# Patient Record
Sex: MALE
[De-identification: names, ages, dates, MRNs, and addresses within clinical notes are randomized per-mention and may not be internally consistent; named-entity substitution may affect disease eponyms.]

---

## 2018-11-22 ENCOUNTER — HOSPITAL ENCOUNTER (EMERGENCY)
Dept: HOSPITAL 97 - ER | Age: 53
Discharge: HOME | End: 2018-11-22
Payer: COMMERCIAL

## 2018-11-22 DIAGNOSIS — R11.0: Primary | ICD-10-CM

## 2018-11-22 DIAGNOSIS — I10: ICD-10-CM

## 2018-11-22 LAB
ALBUMIN SERPL BCP-MCNC: 4 G/DL (ref 3.4–5)
ALP SERPL-CCNC: 93 U/L (ref 45–117)
ALT SERPL W P-5'-P-CCNC: 38 U/L (ref 12–78)
AST SERPL W P-5'-P-CCNC: 29 U/L (ref 15–37)
BUN BLD-MCNC: 14 MG/DL (ref 7–18)
GLUCOSE SERPLBLD-MCNC: 139 MG/DL (ref 74–106)
HCT VFR BLD CALC: 45.2 % (ref 39.6–49)
INR BLD: 0.95
LYMPHOCYTES # SPEC AUTO: 1.6 K/UL (ref 0.7–4.9)
MAGNESIUM SERPL-MCNC: 2 MG/DL (ref 1.8–2.4)
MCH RBC QN AUTO: 32.6 PG (ref 27–35)
MCV RBC: 92.1 FL (ref 80–100)
NT-PROBNP SERPL-MCNC: 8 PG/ML (ref ?–125)
PMV BLD: 10.3 FL (ref 7.6–11.3)
POTASSIUM SERPL-SCNC: 4 MMOL/L (ref 3.5–5.1)
RBC # BLD: 4.91 M/UL (ref 4.33–5.43)
TROPONIN I: < 0.02 NG/ML (ref 0–0.04)

## 2018-11-22 PROCEDURE — 85610 PROTHROMBIN TIME: CPT

## 2018-11-22 PROCEDURE — 83880 ASSAY OF NATRIURETIC PEPTIDE: CPT

## 2018-11-22 PROCEDURE — 81003 URINALYSIS AUTO W/O SCOPE: CPT

## 2018-11-22 PROCEDURE — 96375 TX/PRO/DX INJ NEW DRUG ADDON: CPT

## 2018-11-22 PROCEDURE — 70450 CT HEAD/BRAIN W/O DYE: CPT

## 2018-11-22 PROCEDURE — 84484 ASSAY OF TROPONIN QUANT: CPT

## 2018-11-22 PROCEDURE — 80048 BASIC METABOLIC PNL TOTAL CA: CPT

## 2018-11-22 PROCEDURE — 85025 COMPLETE CBC W/AUTO DIFF WBC: CPT

## 2018-11-22 PROCEDURE — 71045 X-RAY EXAM CHEST 1 VIEW: CPT

## 2018-11-22 PROCEDURE — 96365 THER/PROPH/DIAG IV INF INIT: CPT

## 2018-11-22 PROCEDURE — 83735 ASSAY OF MAGNESIUM: CPT

## 2018-11-22 PROCEDURE — 99284 EMERGENCY DEPT VISIT MOD MDM: CPT

## 2018-11-22 PROCEDURE — 80076 HEPATIC FUNCTION PANEL: CPT

## 2018-11-22 PROCEDURE — 93005 ELECTROCARDIOGRAM TRACING: CPT

## 2018-11-22 PROCEDURE — 36415 COLL VENOUS BLD VENIPUNCTURE: CPT

## 2018-11-22 NOTE — RAD REPORT
EXAM DESCRIPTION:  CT - Head Brain Wo Cont - 11/22/2018 5:57 pm

 

CLINICAL HISTORY:  dizziness,nausea

Drowsiness

 

COMPARISON:  No comparisons

 

TECHNIQUE:  All CT scans are performed using dose optimization technique as appropriate and may inclu
de automated exposure control or mA/KV adjustment according to patient size.

 

FINDINGS:  No intracranial hemorrhage, hydrocephalus or extra-axial fluid collection.No areas of brai
n edema or evidence of midline shift.

 

The paranasal sinuses and mastoids are clear. The calvarium is intact.

 

IMPRESSION:  No acute intracranial abnormality.

## 2018-11-22 NOTE — RAD REPORT
EXAM DESCRIPTION:  RAD - Chest Single View - 11/22/2018 6:55 pm

 

CLINICAL HISTORY:  dizziness, nausea

Chest pain.

 

COMPARISON:  No comparisons

 

FINDINGS:  Portable technique limits examination quality.

 

The lungs are grossly clear. The heart is normal in size. No displaced fractures.

 

IMPRESSION:  No acute intrathoracic process suspected.

## 2018-11-22 NOTE — EDPHYS
Physician Documentation                                                                           

 [facility]                                                                

Name: Michael Cuellar                                                                                 

Age: 53 yrs                                                                                       

Sex: Male                                                                                         

: 1965                                                                                   

MRN: [id number]                                                                                   

Arrival Date: 2018                                                                          

Time: 17:00                                                                                       

Account#: [id number]                                                                            

Bed 14                                                                                            

Private MD:                                                                                       

ED Physician Devon Strong                                                                    

HPI:                                                                                              

                                                                                             

17:20 This 53 yrs old  Male presents to ER via Ambulatory with complaints of          cp  

      Dizziness, Nausea.                                                                          

17:20 The patient presents with dizziness, lightheadedness, feeling off balance. Onset: The   cp  

      symptoms/episode began/occurred 1 hour(s) ago. Associated signs and symptoms: Pertinent     

      positives: headache, nausea, Pertinent negatives: abdominal pain, blurred vision, chest     

      pain, focal weakness, numbness, shortness of breath, syncope, vomiting. Severity of         

      symptoms: in the emergency department the symptoms are unchanged despite home               

      interventions. Patient's baseline: Neuro: alert and fully oriented, Motor: no deficits,     

      Ambulation: walks without assistance, Speech: normal.                                       

                                                                                                  

Historical:                                                                                       

- Allergies:                                                                                      

17:04 No Known Allergies;                                                                     aj1 

- Home Meds:                                                                                      

17:04 blood pressure medicine [Active];                                                       aj1 

- PMHx:                                                                                           

17:04 Hypertension;                                                                           aj1 

- PSHx:                                                                                           

17:04 None;                                                                                   aj1 

                                                                                                  

- Immunization history:: Flu vaccine is up to date.                                               

- Social history:: Smoking status: Patient/guardian denies using tobacco.                         

- Ebola Screening: : Patient denies travel to an Ebola-affected area in the 21 days               

  before illness onset.                                                                           

                                                                                                  

                                                                                                  

ROS:                                                                                              

17:22 Eyes: Negative for injury, pain, redness, and discharge.                                cp  

17:22 Constitutional: Negative for body aches, chills, fever, poor PO intake.                     

17:22 ENT: Negative for drainage from ear(s), ear pain, sore throat, difficulty swallowing,       

      difficulty handling secretions.                                                             

17:22 Cardiovascular: Negative for chest pain, edema, palpitations.                               

17:22 Respiratory: Negative for cough, shortness of breath, wheezing.                             

17:22 Abdomen/GI: Positive for nausea, Negative for abdominal pain, vomiting, diarrhea,           

      constipation, anorexia, black/tarry stool, rectal bleeding.                                 

17:22 Back: Negative for pain at rest, pain with movement, radiated pain.                         

17:22 : Negative for urinary symptoms.                                                          

17:22 Skin: Negative for cellulitis, rash.                                                        

17:22 Neuro: Positive for dizziness, headache, Negative for altered mental status, loss of        

      consciousness, syncope, near syncope, weakness.                                             

17:22 All other systems are negative.                                                             

                                                                                                  

Exam:                                                                                             

17:25 Constitutional: The patient appears in no acute distress, alert, awake,                 cp  

      non-diaphoretic, non-toxic, well developed, well nourished.                                 

17:25 Head/Face:  Normocephalic, atraumatic. Eyes:  Pupils equal round and reactive to light, cp  

      extra-ocular motions intact.  Lids and lashes normal.  Conjunctiva and sclera are           

      non-icteric and not injected.  Cornea within normal limits.  Periorbital areas with no      

      swelling, redness, or edema. ENT:  Nares patent. No nasal discharge, no septal              

      abnormalities noted.  Tympanic membranes are normal and external auditory canals are        

      clear.  Oropharynx with no redness, swelling, or masses, exudates, or evidence of           

      obstruction, uvula midline.  Mucous membranes moist. Neck:  Trachea midline, no             

      thyromegaly or masses palpated, and no cervical lymphadenopathy.  Supple, full range of     

      motion without nuchal rigidity, or vertebral point tenderness.  No Meningismus.             

      Chest/axilla:  Normal chest wall appearance and motion.  Nontender with no deformity.       

      No lesions are appreciated.                                                                 

17:25 Cardiovascular: Rate: normal, Rhythm: regular, Heart sounds: murmur, not appreciated,       

      Edema: is not appreciated, JVD: is not appreciated.                                         

17:25 Respiratory: the patient does not display signs of respiratory distress,  Respirations:     

      normal, no use of accessory muscles, no retractions, no splinting, no tachypnea,            

      labored breathing, is not present, Breath sounds: are clear throughout, no decreased        

      breath sounds, no stridor, no wheezing.                                                     

17:25 Abdomen/GI: Inspection: abdomen appears normal, Bowel sounds: active, all quadrants,        

      Palpation: abdomen is soft and non-tender, in all quadrants, rebound tenderness, is not     

      appreciated, voluntary guarding, is not appreciated, involuntary guarding, is not           

      appreciated.                                                                                

17:25 Back: pain, is absent, ROM is normal.                                                       

17:25 Skin: cellulitis, is not appreciated, no rash present.                                      

17:25 Neuro: Orientation: to person, place \T\ time. Mentation: is normal, Cerebellar function:   

      is grossly normal, Motor: moves all fours, strength is normal, Sensation: is normal.        

17:45 ECG was reviewed by the Attending Physician.                                            cp  

                                                                                                  

Vital Signs:                                                                                      

17:04  / 95; Pulse 80; Resp 18; Temp 97.7; Pulse Ox 98% on R/A; Weight 108.86 kg (R);   aj1 

      Height 5 ft. 8 in. (172.72 cm) (R); Pain 0/10;                                              

17:12  / 82 LA Supine; Pulse 77; Resp 18; Pulse Ox 97% on R/A;                          hj  

17:12  / 89 Sitting; Pulse 72; Resp 18; Pulse Ox 98% on R/A;                            hj  

17:12  / 89 Standing; Pulse 81; Resp 18; Pulse Ox 99% on R/A;                           hj  

18:06  / 84; Pulse 74; Resp 18; Pulse Ox 100% on R/A;                                   hj  

18:49  / 87; Pulse 67; Resp 18; Pulse Ox 100% on R/A;                                   hj  

19:20  / 85; Pulse 71; Resp 18 S; Pulse Ox 97% on R/A;                                  cc3 

20:45  / 96; Pulse 74; Resp 18 S; Pulse Ox 98% on R/A;                                  cc3 

17:04 Body Mass Index 36.49 (108.86 kg, 172.72 cm)                                            aj1 

                                                                                                  

MDM:                                                                                              

17:10 Patient medically screened.                                                             cp  

17:30 Differential diagnosis: cardiac arrhythmia, generalized weakness, hypovolemia,          cp  

      idiopathic dizziness, vertigo.                                                              

20:42 Data reviewed: vital signs, nurses notes, lab test result(s), EKG, radiologic studies,  cp  

      CT scan, plain films.                                                                       

20:42 Test interpretation: by ED physician or midlevel provider: ECG, plain radiologic        cp  

      studies. Counseling: I had a detailed discussion with the patient and/or guardian           

      regarding: the historical points, exam findings, and any diagnostic results supporting      

      the discharge/admit diagnosis, lab results, radiology results, to return to the             

      emergency department if symptoms worsen or persist or if there are any questions or         

      concerns that arise at home. Response to treatment: the patient's symptoms have             

      markedly improved after treatment. ED course: VSS. Patient reports symptoms markedly        

      improved. Will discharge to home for continued monitoring.                                  

                                                                                                  

                                                                                             

17:41 Order name: Basic Metabolic Panel; Complete Time: 19:06                                 EDMS

                                                                                             

19:07 Interpretation: Normal except: GLUC 139; GFR 78; CA 8.1.                                  

                                                                                             

17:41 Order name: Liver (Hepatic) Function; Complete Time: 19:06                              EDMS

                                                                                             

19:27 Interpretation: Reviewed.                                                                 

                                                                                             

17:41 Order name: Troponin I; Complete Time: 19:06                                            EDMS

                                                                                             

19:06 Interpretation: Within normal limits: TROP < 0.02.                                        

                                                                                             

17:41 Order name: NT PRO-BNP; Complete Time: 19:06                                            EDMS

                                                                                             

19:27 Interpretation: Within normal limits: NT PRO-BNP 8.                                     cp  

                                                                                             

17:41 Order name: Magnesium; Complete Time: 19:06                                             EDMS

                                                                                             

19:28 Interpretation: MG 2.0; Reviewed.                                                         

                                                                                             

17:41 Order name: CBC with Automated Diff; Complete Time: 19:06                               EDMS

                                                                                             

19:06 Interpretation: Reviewed.                                                                 

                                                                                             

17:41 Order name: Protime (+INR); Complete Time: 19:06                                        EDMS

                                                                                             

17:20 Order name: Orthostatics; Complete Time: 17:21                                            

                                                                                             

17:20 Order name: EKG; Complete Time: 18:16                                                     

                                                                                             

17:20 Order name: Cardiac monitoring; Complete Time: 17:21                                      

                                                                                             

17:20 Order name: EKG - Nurse/Tech; Complete Time: 17:32                                        

                                                                                             

17:20 Order name: IV Saline Lock; Complete Time: 17:32                                          

                                                                                             

17:20 Order name: Labs collected and sent; Complete Time: 17:32                                 

                                                                                             

17:20 Order name: O2 Per Protocol; Complete Time: 17:21                                         

                                                                                             

17:20 Order name: CT Head Brain wo Cont                                                         

                                                                                             

17:41 Order name: Head Brain Wo Cont; Complete Time: 19:06                                    EDMS

                                                                                             

19:07 Interpretation: Report reviewed.                                                          

                                                                                             

17:41 Order name: Chest Single View; Complete Time: 19:06                                     EDMS

                                                                                             

19:07 Interpretation: Report reviewed.                                                          

                                                                                             

18:53 Order name: Urine Dipstick--Ancillary (enter results); Complete Time: 20:42               

                                                                                             

17:20 Order name: O2 Sat Monitoring; Complete Time: 17:21                                     cp  

                                                                                                  

EC:45 Rate is 64 beats/min. Rhythm is regular. DE interval is normal. QRS interval is normal. cp  

      QT interval is normal. Interpreted by me. Reviewed by me.                                   

                                                                                                  

Administered Medications:                                                                         

17:25 Drug: Meclizine 25 mg Route: PO;                                                          

18:34 Follow up: Response: No adverse reaction                                                hj  

17:30 Drug: Zofran 4 mg Route: IVP; Site: right antecubital;                                  hj  

18:34 Follow up: Response: No adverse reaction; Nausea is decreased                             

19:45 Drug: Magnesium Sulfate 1 grams Route: IVPB; Infused Over: 1 hrs; Site: right           cc3 

      antecubital;                                                                                

20:45 Follow up: Response: No adverse reaction; IV Status: Completed infusion; IV Intake:     cc3 

      100ml                                                                                       

19:48 Not Given (Patient Refused): Dilaudid 0.5 mg IVP once                                   cc3 

                                                                                                  

                                                                                                  

Disposition:                                                                                      

18 20:43 Discharged to Home. Impression: Dizziness and giddiness, Nausea.                   

- Condition is Stable.                                                                            

- Discharge Instructions: Dizziness, Nausea, Adult.                                               

- Prescriptions for Meclizine 25 mg Oral Tablet - take 1 tablet by ORAL route every 8             

  hours As needed; 30 tablet. Zofran 4 mg Oral Tablet - take 1 tablet by ORAL route               

  every 12 hours As needed; 20 tablet.                                                            

- Medication Reconciliation Form, Thank You Letter, Antibiotic Education, Prescription            

  Opioid Use form.                                                                                

- Follow up: Private Physician; When: 1 - 2 days; Reason: Recheck today's complaints.             

- Problem is new.                                                                                 

- Symptoms have improved.                                                                         

                                                                                                  

                                                                                                  

                                                                                                  

Addendum:                                                                                         

2018                                                                                        

     06:04 Co-signature as Attending Physician, Devon Strong MD.                               m
a2

                                                                                                  

Signatures:                                                                                       

Dispatcher [employer]                           EDMS                                                 

Haley Henson RN RN   aj1                                                  

Martínez Alvarado RN RN hj Page, Corey, PA PA cp Alzahri, Mohammad, MD MD   ma2                                                  

Odalis Hagen                             cc3                                                  

                                                                                                  

Corrections: (The following items were deleted from the chart)                                    

                                                                                             

18:23 18:16 Chest Single View+RAD.RAD.BRZ ordered. EDMS                                       EDMS

19:07 19:06 Normal except: GLUC 139; GFR 78. cp                                               cp  

20:24 18:16 BASIC METABOLIC PANEL+C.LAB.BRZ ordered. EDMS                                     EDMS

20:24 18:16 CBC+H.LAB.BRZ ordered. EDMS                                                       EDMS

20:24 18:16 HEPATIC FUNCTION+C.LAB.BRZ ordered. EDMS                                          EDMS

20:24 18:16 MAGNESIUM+C.LAB.BRZ ordered. EDMS                                                 EDMS

20:24 18:16 PROBNP+C.LAB.BRZ ordered. EDMS                                                    EDMS

20:24 18:16 PROTIME (+INR)+COAG.LAB.BRZ ordered. EDMS                                         EDMS

20:24 18:16 TROPONIN (EMERG DEPT USE ONLY)+C.LAB.BRZ ordered. [facility]                            EDMS

21:00 20:43 2018 20:43 Discharged to Home. Impression: Dizziness and giddiness; Nausea. cc3 

      Condition is Stable. Forms are Medication Reconciliation Form, Thank You Letter,            

      Antibiotic Education, Prescription Opioid Use. Follow up: Private Physician; When: 1 -      

      2 days; Reason: Recheck today's complaints. Problem is new. Symptoms have improved. cp      

                                                                                                  

**************************************************************************************************

## 2018-11-22 NOTE — ER
Nurse's Notes                                                                                     

 [facility]                                                                

Name: Michael Cuellar                                                                                 

Age: 53 yrs                                                                                       

Sex: Male                                                                                         

: 1965                                                                                   

MRN: [id number]                                                                                   

Arrival Date: 2018                                                                          

Time: 17:00                                                                                       

Account#: [id number]                                                                            

Bed 14                                                                                            

Private MD:                                                                                       

Diagnosis: Dizziness and giddiness;Nausea                                                         

                                                                                                  

Presentation:                                                                                     

                                                                                             

17:02 Presenting complaint: Patient states: Dizziness and nausea for the past hour. Denies    aj1 

      pain. Transition of care: patient was not received from another setting of care. Onset      

      of symptoms was 2018 at 16:00. Risk Assessment: Do you want to hurt            

      yourself or someone else? Patient reports no desire to harm self or others. Initial         

      Sepsis Screen: Does the patient meet any 2 criteria? No. Patient's initial sepsis           

      screen is negative. Does the patient have a suspected source of infection? No.              

      Patient's initial sepsis screen is negative. Care prior to arrival: None.                   

17:02 Method Of Arrival: Ambulatory                                                           aj1 

17:02 Acuity: LAYLA 3                                                                           aj1 

                                                                                                  

Triage Assessment:                                                                                

17:04 General: Appears in no apparent distress. comfortable, Behavior is calm, cooperative,   aj1 

      appropriate for age. Pain: Denies pain. Neuro: Level of Consciousness is awake, alert,      

      obeys commands, Reports dizziness. Cardiovascular: Patient's skin is warm and dry.          

      Respiratory: Airway is patent Respiratory effort is even, unlabored, Respiratory            

      pattern is regular, symmetrical. GI: Reports nausea.                                        

                                                                                                  

Historical:                                                                                       

- Allergies:                                                                                      

17:04 No Known Allergies;                                                                     aj1 

- Home Meds:                                                                                      

17:04 blood pressure medicine [Active];                                                       aj1 

- PMHx:                                                                                           

17:04 Hypertension;                                                                           aj1 

- PSHx:                                                                                           

17:04 None;                                                                                   aj1 

                                                                                                  

- Immunization history:: Flu vaccine is up to date.                                               

- Social history:: Smoking status: Patient/guardian denies using tobacco.                         

- Ebola Screening: : Patient denies travel to an Ebola-affected area in the 21 days               

  before illness onset.                                                                           

                                                                                                  

                                                                                                  

Screenin:06 Abuse screen: Denies threats or abuse. Denies injuries from another. Nutritional        hj  

      screening: No deficits noted. Tuberculosis screening: No symptoms or risk factors           

      identified. Fall Risk None identified.                                                      

                                                                                                  

Assessment:                                                                                       

17:06 General: Appears in no apparent distress. uncomfortable, Behavior is calm, cooperative, hj  

      appropriate for age. Pain: Denies pain. Neuro: Level of Consciousness is awake, alert,      

      obeys commands, Oriented to person, place, time, situation, Appropriate for age Reports     

      dizziness. Cardiovascular: Capillary refill < 3 seconds Patient's skin is warm and dry.     

      Respiratory: Airway is patent Respiratory effort is even, unlabored, Respiratory            

      pattern is regular, symmetrical. GI: Reports nausea. GI: Abdomen is non-distended,          

      Bowel sounds present X 4 quads. Abd is soft and non tender. : No signs and/or             

      symptoms were reported regarding the genitourinary system. EENT: No signs and/or            

      symptoms were reported regarding the EENT system. Derm: No signs and/or symptoms            

      reported regarding the dermatologic system. Musculoskeletal: No signs and/or symptoms       

      reported regarding the musculoskeletal system.                                              

18:05 Reassessment: Patient and/or family updated on plan of care and expected duration. Pain hj  

      level reassessed. Patient is alert, oriented x 3, equal unlabored respirations, skin        

      warm/dry/pink. wheeled back from CT:.                                                       

18:33 Reassessment: Patient and/or family updated on plan of care and expected duration. Pain hj  

      level reassessed. Patient is alert, oriented x 3, equal unlabored respirations, skin        

      warm/dry/pink. provided urine sample;.                                                      

18:49 Reassessment: Patient and/or family updated on plan of care and expected duration. Pain hj  

      level reassessed. Patient is alert, oriented x 3, equal unlabored respirations, skin        

      warm/dry/pink. awaiting results and POC;.                                                   

19:15 Reassessment: Patient appears in no apparent distress at this time. Patient and/or      cc3 

      family updated on plan of care and expected duration. Pain level reassessed. Patient is     

      alert, oriented x 3, equal unlabored respirations, skin warm/dry/pink. Received this        

      male patient from morning shift RAMAKRISHNA Soliz as a case of dizziness and nausea. With IV         

      cannula gauge 20 at the right ACV saline locked.                                            

20:55 Reassessment: Patient appears in no apparent distress at this time. Patient and/or      cc3 

      family updated on plan of care and expected duration. Pain level reassessed. Patient is     

      alert, oriented x 3, equal unlabored respirations, skin warm/dry/pink. VINAY Ugarte              

      discharged the patient home with prescription given. IV cannula removed and patient         

      left ER vitally stable and ambulatory with his wife.                                        

                                                                                                  

Vital Signs:                                                                                      

17:04  / 95; Pulse 80; Resp 18; Temp 97.7; Pulse Ox 98% on R/A; Weight 108.86 kg (R);   aj1 

      Height 5 ft. 8 in. (172.72 cm) (R); Pain 0/10;                                              

17:12  / 82 LA Supine; Pulse 77; Resp 18; Pulse Ox 97% on R/A;                          hj  

17:12  / 89 Sitting; Pulse 72; Resp 18; Pulse Ox 98% on R/A;                            hj  

17:12  / 89 Standing; Pulse 81; Resp 18; Pulse Ox 99% on R/A;                           hj  

18:06  / 84; Pulse 74; Resp 18; Pulse Ox 100% on R/A;                                   hj  

18:49  / 87; Pulse 67; Resp 18; Pulse Ox 100% on R/A;                                   hj  

19:20  / 85; Pulse 71; Resp 18 S; Pulse Ox 97% on R/A;                                  cc3 

20:45  / 96; Pulse 74; Resp 18 S; Pulse Ox 98% on R/A;                                  cc3 

17:04 Body Mass Index 36.49 (108.86 kg, 172.72 cm)                                            aj1 

                                                                                                  

ED Course:                                                                                        

17:00 Patient arrived in ED.                                                                  ds1 

17:03 Triage completed.                                                                       aj1 

17:04 Arm band placed on Patient placed in an exam room.                                      aj1 

17:05 Martínez Alvarado, RN is Primary Nurse.                                                    hj  

17:06 Patient has correct armband on for positive identification. Placed in gown. Bed in low  hj  

      position. Call light in reach. Side rails up X 1. Adult w/ patient.                         

17:10 Faheem Ugarte PA is PHCP.                                                                cp  

17:10 Devon Strong MD is Attending Physician.                                           cp  

17:32 Initial lab(s) drawn, by me, sent to lab. EKG done, reviewed by Faheem MCLEAN. Inserted hj  

      saline lock: 20 gauge in right antecubital area, using aseptic technique. Blood             

      collected.                                                                                  

17:57 CT completed. Patient moved to CT via wheelchair. Patient moved back from CT.           cw1 

17:58 Head Brain Wo Cont In Process Unspecified.                                              EDMS

18:11 X-ray completed. Portable x-ray completed in exam room. Patient tolerated procedure     kp1 

      well.                                                                                       

18:55 Chest Single View In Process Unspecified.                                               EDMS

20:55 No provider procedures requiring assistance completed. IV discontinued, intact,         cc3 

      bleeding controlled, No redness/swelling at site. Pressure dressing applied.                

                                                                                                  

Administered Medications:                                                                         

17:25 Drug: Meclizine 25 mg Route: PO;                                                        hj  

18:34 Follow up: Response: No adverse reaction                                                hj  

17:30 Drug: Zofran 4 mg Route: IVP; Site: right antecubital;                                  hj  

18:34 Follow up: Response: No adverse reaction; Nausea is decreased                           hj  

19:45 Drug: Magnesium Sulfate 1 grams Route: IVPB; Infused Over: 1 hrs; Site: right           cc3 

      antecubital;                                                                                

20:45 Follow up: Response: No adverse reaction; IV Status: Completed infusion; IV Intake:     cc3 

      100ml                                                                                       

19:48 Not Given (Patient Refused): Dilaudid 0.5 mg IVP once                                   cc3 

                                                                                                  

                                                                                                  

Intake:                                                                                           

20:45 IV: 100ml; Total: 100ml.                                                                cc3 

                                                                                                  

Outcome:                                                                                          

20:43 Discharge ordered by MD.                                                                cp  

20:55 Discharged to home ambulatory, with family.                                             cc3 

20:55 Condition: stable                                                                           

20:55 Discharge instructions given to patient, family, Instructed on discharge instructions,      

      follow up and referral plans. medication usage, Demonstrated understanding of               

      instructions, follow-up care, medications, Prescriptions given X 2.                         

21:00 Patient left the ED.                                                                    cc3 

                                                                                                  

Signatures:                                                                                       

Dispatcher [employer]                           [facility]                                                 

Haley Henson, RAMAKRISHNA                     RN   Sue Paul                                ds1                                                  

Cindy Arguello                             cw1                                                  

Martínez Alvarado RN RN hj Page, Corey, PA PA cp Poole, Kathy                                 kp1                                                  

Odalis Hagen                             cc3                                                  

                                                                                                  

**************************************************************************************************

## 2018-11-23 NOTE — EKG
Test Date:    2018-11-22               Test Time:    17:37:30

Technician:   MARICHUY                                     

                                                     

MEASUREMENT RESULTS:                                       

Intervals:                                           

Rate:         64                                     

PA:           178                                    

QRSD:         88                                     

QT:           378                                    

QTc:          389                                    

Axis:                                                

P:            55                                     

PA:           178                                    

QRS:          16                                     

T:            32                                     

                                                     

INTERPRETIVE STATEMENTS:                                       

                                                     

Normal sinus rhythm

Normal ECG

No previous ECG available for comparison



Electronically Signed On 11-23-18 06:16:39 CST by Stefan Chandler

## 2021-03-29 ENCOUNTER — HOSPITAL ENCOUNTER (EMERGENCY)
Dept: HOSPITAL 97 - ER | Age: 56
Discharge: ANOTHER HOSPITAL | End: 2021-03-29
Payer: COMMERCIAL

## 2021-03-29 VITALS — TEMPERATURE: 97.1 F

## 2021-03-29 DIAGNOSIS — Z20.822: ICD-10-CM

## 2021-03-29 DIAGNOSIS — I62.02: Primary | ICD-10-CM

## 2021-03-29 DIAGNOSIS — I10: ICD-10-CM

## 2021-03-29 LAB
ALBUMIN SERPL BCP-MCNC: 4.1 G/DL (ref 3.4–5)
ALP SERPL-CCNC: 63 U/L (ref 45–117)
ALT SERPL W P-5'-P-CCNC: 45 U/L (ref 12–78)
AST SERPL W P-5'-P-CCNC: 20 U/L (ref 15–37)
BUN BLD-MCNC: 16 MG/DL (ref 7–18)
GLUCOSE SERPLBLD-MCNC: 93 MG/DL (ref 74–106)
HCT VFR BLD CALC: 44 % (ref 39.6–49)
INR BLD: 1.01
LYMPHOCYTES # SPEC AUTO: 1.5 K/UL (ref 0.7–4.9)
PMV BLD: 10.7 FL (ref 7.6–11.3)
POTASSIUM SERPL-SCNC: 4.2 MMOL/L (ref 3.5–5.1)
RBC # BLD: 4.7 M/UL (ref 4.33–5.43)

## 2021-03-29 PROCEDURE — 96375 TX/PRO/DX INJ NEW DRUG ADDON: CPT

## 2021-03-29 PROCEDURE — 36415 COLL VENOUS BLD VENIPUNCTURE: CPT

## 2021-03-29 PROCEDURE — 85610 PROTHROMBIN TIME: CPT

## 2021-03-29 PROCEDURE — 99284 EMERGENCY DEPT VISIT MOD MDM: CPT

## 2021-03-29 PROCEDURE — 85025 COMPLETE CBC W/AUTO DIFF WBC: CPT

## 2021-03-29 PROCEDURE — 85730 THROMBOPLASTIN TIME PARTIAL: CPT

## 2021-03-29 PROCEDURE — 70450 CT HEAD/BRAIN W/O DYE: CPT

## 2021-03-29 PROCEDURE — 96374 THER/PROPH/DIAG INJ IV PUSH: CPT

## 2021-03-29 PROCEDURE — 96361 HYDRATE IV INFUSION ADD-ON: CPT

## 2021-03-29 PROCEDURE — 80053 COMPREHEN METABOLIC PANEL: CPT

## 2021-03-29 NOTE — ER
Nurse's Notes                                                                                     

 [facility]                                                                 

Name: Michael Cuellar                                                                                 

Age: 55 yrs                                                                                       

Sex: Male                                                                                         

: 1965                                                                                   

MRN: [id number]                                                                                   

Arrival Date: 2021                                                                          

Time: 14:36                                                                                       

Account#: [id number]                                                                            

Bed 20                                                                                            

Private MD:                                                                                       

Diagnosis: Nontraumatic subacute subdural hemorrhage                                              

                                                                                                  

Presentation:                                                                                     

                                                                                             

14:41 Chief complaint: Patient states: Headaches x 2 - 3 weeks. Was prescribed Sumatriptan    ca1 

      this morning and took 1 at 1300, no relief. Reports nausea with headache. Coronavirus       

      screen: Client denies travel out of the [place] in the last 14 days. headache, nausea,         

      Client presents with at least one sign or symptom that may indicate coronavirus-19.         

      Standard/surgical mask placed on the client. Provider contacted for isolation               

      considerations. Ebola Screen: Patient negative for fever greater than or equal to 101.5     

      degrees Fahrenheit, and additional compatible Ebola Virus Disease symptoms Patient          

      denies exposure to infectious person. Patient denies travel to an Ebola-affected area       

      in the 21 days before illness onset. No symptoms or risks identified at this time.          

      Initial Sepsis Screen: Does the patient meet any 2 criteria? No. Patient's initial          

      sepsis screen is negative. Does the patient have a suspected source of infection? No.       

      Patient's initial sepsis screen is negative. Risk Assessment: Do you want to hurt           

      yourself or someone else? Patient reports no desire to harm self or others. Onset of        

      symptoms was 2021.                                                                

14:41 Method Of Arrival: Ambulatory                                                           ca1 

14:41 Acuity: LAYLA 4                                                                           ca1 

16:23 Acuity: LAYLA 2                                                                           iw  

                                                                                                  

Triage Assessment:                                                                                

14:45 Headache History: The patient has had previous headaches and this one is similar to     bp  

      previous episodes. General: Appears in no apparent distress. uncomfortable, Behavior is     

      cooperative, appropriate for age, anxious. Pain: Complains of pain in head Pain             

      currently is 6 out of 10 on a pain scale. Pain began 3 WEEKS AGO Also complains of no       

      other associated symptoms. EENT: No deficits noted. Neuro: Level of Consciousness is        

      awake, alert, obeys commands, Oriented to Appropriate for age. Cardiovascular: No           

      deficits noted. Respiratory: No deficits noted. GI: No signs and/or symptoms were           

      reported involving the gastrointestinal system. : No signs and/or symptoms were           

      reported regarding the genitourinary system. Derm: No deficits noted. Musculoskeletal:      

      No deficits noted.                                                                          

                                                                                                  

Historical:                                                                                       

- Allergies:                                                                                      

14:45 No Known Allergies;                                                                     ca1 

- PMHx:                                                                                           

14:45 Hypertension;                                                                           ca1 

- PSHx:                                                                                           

14:45 None;                                                                                   ca1 

                                                                                                  

- Immunization history:: Flu vaccine is up to date.                                               

- Social history:: Smoking status: Patient denies any tobacco usage or history of.                

  Patient/guardian denies using alcohol, street drugs, The patient lives with family.             

- Family history:: not pertinent.                                                                 

                                                                                                  

                                                                                                  

Screenin:45 Abuse screen: Denies threats or abuse. Denies injuries from another. Nutritional        bp  

      screening: No deficits noted. Tuberculosis screening: No symptoms or risk factors           

      identified. Fall Risk None identified.                                                      

                                                                                                  

Assessment:                                                                                       

14:45 General: SEE TRIAGE NOTE. Pain: Complains of pain in head.                              bp  

15:35 Reassessment: No changes from previously documented assessment. Patient and/or family   bp  

      updated on plan of care and expected duration. Pain level reassessed. PT TO CT.             

15:53 Reassessment: PT RETURNED FROM CT.                                                      bp  

17:00 Reassessment: CT HEAD GROSSLY ABNORMAL, XFER IN PROCESS. Neuro: Level of Consciousness  bp  

      is awake, alert, obeys commands, Oriented to Appropriate for age.                           

18:00 Reassessment: No changes from previously documented assessment. TRANSFER ACCEPTED.      bp  

      TRANSPORT PENDING.                                                                          

18:30 Reassessment: REPORT TO JASS DURBIN FOR [facility].                                    bp  

18:55 Reassessment: EMS AT B/S FOR TRANSPORT. PT [facility] FOR [facility] [address] [address] BED 6.    bp  

                                                                                                  

Vital Signs:                                                                                      

14:41  / 92; Pulse 51; Resp 18 S; Temp 97.1(TE); Pulse Ox 98% on R/A; Weight 112.49 kg  ca1 

      (R); Height 5 ft. 8 in. (172.72 cm) (R); Pain 9/10;                                         

15:52  / 78; Pulse 61; Resp 16; Pulse Ox 96% ;                                          bp  

17:00  / 79; Pulse 53; Resp 17; Pulse Ox 95% ;                                          bp  

18:00  / 83; Pulse 52; Resp 17; Pulse Ox 93% ;                                          bp  

14:41 Body Mass Index 37.71 (112.49 kg, 172.72 cm)                                            [facility] 

                                                                                                  

Sylwia Coma Score:                                                                               

16:07 Eye Response: spontaneous(4). Verbal Response: oriented(5). Motor Response: obeys       ma2 

      commands(6). Total: 15.                                                                     

                                                                                                  

ED Course:                                                                                        

14:36 Patient arrived in ED.                                                                  as  

14:44 Triage completed.                                                                       ca1 

14:45 Arm band placed on right wrist.                                                         ca1 

14:45 Patient has correct armband on for positive identification. Bed in low position. Call   bp  

      light in reach. Side rails up X2.                                                           

14:46 Devon Strong MD is Attending Physician.                                           ma2 

14:52 Jem Joel, RN is Primary Nurse.                                                    bp  

15:35 Inserted saline lock: 20 gauge in right forearm, using aseptic technique. Blood         bp  

      collected.                                                                                  

15:40 Head Brain Wo Cont CT In Process Unspecified.                                           EDMS

16:54 initiated transfer to [facility].                                              bd  

18:06 pt accepted in transfer to [facility] by dr Alvarado, admin approval given by    elham murray.                                                                           

                                                                                                  

Administered Medications:                                                                         

15:35 Drug: NS 0.9% 1000 ml Route: IV; Rate: 1 bolus; Site: right forearm;                    bp  

18:55 Follow up: IV Status: Completed infusion; IV Intake: 1000ml                             bp  

15:35 Drug: Reglan 20 mg Route: IVP; Site: right forearm;                                     bp  

16:29 Follow up: Response: Pain is decreased                                                  bp  

15:35 Drug: TORadol 30 mg Route: IVP; Site: right forearm;                                    bp  

16:29 Follow up: Response: Pain is decreased                                                  bp  

15:35 Drug: Benadryl (diphenhydrAMINE) 25 mg Route: IVP; Site: right forearm;                 bp  

16:30 Follow up: Response: Pain is decreased                                                  bp  

16:15 Drug: Labetalol 10 mg Route: IVP; Site: right forearm;                                  bp  

16:31 Follow up: Response: No adverse reaction                                                bp  

16:45 Drug: D5-1/2 NS with KCl 10 mEq/L 1000 ml Route: IV; Rate: 100 ml/hr; Site: right       bp  

      forearm;                                                                                    

18:29 Follow up: IV Status: Infusion continued upon transfer                                  bp  

                                                                                                  

                                                                                                  

Intake:                                                                                           

18:55 IV: 1000ml; Total: 1000ml.                                                              bp  

                                                                                                  

Outcome:                                                                                          

16:12 ER care complete, transfer ordered by MD.                                               ma2 

18:56 Patient left the ED.                                                                    bp  

                                                                                                  

Signatures:                                                                                       

Dispatcher [employer]                           EDMS                                                 

Silvia Garcia Amelia as Williams, Irene, RN                     RN   velvet Joel Jem, RN                      RN   bp                                                   

Devon Strong MD MD   ma2                                                  

Estrella Pressley RN                        RN   ca1                                                  

                                                                                                  

**************************************************************************************************

## 2021-03-29 NOTE — XMS REPORT
Continuity of Care Document

                            Created on:2021



Patient:KASANDRA HARGROVE

Sex:Male

:1965

External Reference #:[id number]





Demographics







                          Address                   [address]



                                                    [place] [address]

 

                          Home Phone                [phone]

 

                          Work Phone                [phone]

 

                          Mobile Phone              [phone]

 

                          Email Address             NONE

 

                          Preferred Language        spa

 

                          Marital Status            Unknown

 

                          Jainism Affiliation     Unknown

 

                          Race                      Unknown

 

                          Additional Race(s)        Unavailable



                                                    White

 

                          Ethnic Group              Unknown









Author







                          Organization              [facility]

t

 

                          Address                   [address]



                                                    [place] [address]

 

                          Phone                     [phone]









Support







                Name            Relationship    Address         Phone

 

                Unavailable     Spouse          [address] [phone]



                                                [place] [address] 









Care Team Providers







                    Name                Role                Phone

 

                    Darryl CROWLEY         Attending Clinician [phone]

 

                    Doctor Unassigned,  Name Attending Clinician Unavailable

 

                    Gaston CROWLEY,  A      Attending Clinician [phone]









Problems

This patient has no known problems.



Allergies, Adverse Reactions, Alerts

This patient has no known allergies or adverse reactions.



Medications

This patient has no known medications.



Procedures

This patient has no known procedures.



Encounters







        Start   End     Encounter Admission Attending Care    Care    Encounter 

Source



        Date/Time Date/Time Type    Type    Clinicians Facility Department ID   

   

 

        2020-11-10 2020-11-10 [facility]         SOPHIA Andrews [id number]

[id number] 



        14:33:00 23:59:00 Encounter         Ricardo SOLER       [id number]         



                                                BUILDING [id number]         



                                                        [id number]         



                                                        031             

 

        2020-11-10 2020-11-10 Orders          Doctor  SANDRA    [id number]

[id number] 



        00:00:00 00:00:00 Only            Unassigned, BELEM   [id number]       

  



                                        [place] [facility] [place] [facility]\"" [id number]         



                                                        [phone]         



                                                        009             

 

        2020 Office          HOSSEIN Feldman    [id number]

133 



        11:17:09 12:03:56 Visit           [employer] [employer]  [id number]        

 



                                                [place] [id number]         



                                                [place] [phone]         



                                                nal     044             



                                                Office                  



                                                Building                 



                                                One                     







Results

This patient has no known results.

## 2021-03-29 NOTE — EDPHYS
Physician Documentation                                                                           

 [facility]                                                                 

Name: Michael Cuellar                                                                                 

Age: 55 yrs                                                                                       

Sex: Male                                                                                         

: 1965                                                                                   

MRN: [id number]                                                                                   

Arrival Date: 2021                                                                          

Time: 14:36                                                                                       

Account#: [id number]                                                                            

Bed 20                                                                                            

Private MD:                                                                                       

ED Physician Devon Strong                                                                    

HPI:                                                                                              

                                                                                             

16:07 This 55 yrs old  Male presents to ER via Ambulatory with complaints of Headache.ma2 

16:07 The patient complains of pain to the forehead and left base of the skull. Onset: The    ma2 

      symptoms/episode began/occurred gradually, 3 week(s) ago. Severity of symptoms: At its      

      worst the pain was moderate, in the emergency department the pain is unchanged.             

      Headache History: Denies prior headaches. The patient has not experienced similar           

      symptoms in the past.                                                                       

                                                                                                  

Historical:                                                                                       

- Allergies:                                                                                      

14:45 No Known Allergies;                                                                     ca1 

- PMHx:                                                                                           

14:45 Hypertension;                                                                           ca1 

- PSHx:                                                                                           

14:45 None;                                                                                   ca1 

                                                                                                  

- Immunization history:: Flu vaccine is up to date.                                               

- Social history:: Smoking status: Patient denies any tobacco usage or history of.                

  Patient/guardian denies using alcohol, street drugs, The patient lives with family.             

- Family history:: not pertinent.                                                                 

                                                                                                  

                                                                                                  

ROS:                                                                                              

16:07 Constitutional: Negative for fever, chills, and weight loss.                            ma2 

16:07 All other systems are negative.                                                             

                                                                                                  

Exam:                                                                                             

16:07 Constitutional:  This is a well developed, well nourished patient who is awake, alert,  ma2 

      and in no acute distress. Head/Face:  Normocephalic, atraumatic. Eyes:  Pupils equal        

      round and reactive to light, extra-ocular motions intact.  Lids and lashes normal.          

      Conjunctiva and sclera are non-icteric and not injected.  Cornea within normal limits.      

      Periorbital areas with no swelling, redness, or edema. ENT:  Nares patent. No nasal         

      discharge, no septal abnormalities noted.  Tympanic membranes are normal and external       

      auditory canals are clear.  Oropharynx with no redness, swelling, or masses, exudates,      

      or evidence of obstruction, uvula midline.  Mucous membranes moist. Neck:  Trachea          

      midline, no thyromegaly or masses palpated, and no cervical lymphadenopathy.  Supple,       

      full range of motion without nuchal rigidity, or vertebral point tenderness.  No            

      Meningismus. Chest/axilla:  Normal chest wall appearance and motion.  Nontender with no     

      deformity.  No lesions are appreciated. Cardiovascular:  Regular rate and rhythm with a     

      normal S1 and S2.  No gallops, murmurs, or rubs.  Normal PMI, no JVD.  No pulse             

      deficits. Respiratory:  Lungs have equal breath sounds bilaterally, clear to                

      auscultation and percussion.  No rales, rhonchi or wheezes noted.  No increased work of     

      breathing, no retractions or nasal flaring. Abdomen/GI:  Soft, non-tender, with normal      

      bowel sounds.  No distension or tympany.  No guarding or rebound.  No evidence of           

      tenderness throughout. MS/ Extremity:  Pulses equal, no cyanosis.  Neurovascular            

      intact.  Full, normal range of motion. Neuro:  Awake and alert, GCS 15, oriented to         

      person, place, time, and situation.  Cranial nerves II-XII grossly intact.  Motor           

      strength 5/5 in all extremities.  Sensory grossly intact.  Cerebellar exam normal.          

      Normal gait.                                                                                

                                                                                                  

Vital Signs:                                                                                      

14:41  / 92; Pulse 51; Resp 18 S; Temp 97.1(TE); Pulse Ox 98% on R/A; Weight 112.49 kg  ca1 

      (R); Height 5 ft. 8 in. (172.72 cm) (R); Pain 9/10;                                         

15:52  / 78; Pulse 61; Resp 16; Pulse Ox 96% ;                                          bp  

17:00  / 79; Pulse 53; Resp 17; Pulse Ox 95% ;                                          bp  

18:00  / 83; Pulse 52; Resp 17; Pulse Ox 93% ;                                          bp  

14:41 Body Mass Index 37.71 (112.49 kg, 172.72 cm)                                            ca1 

                                                                                                  

Sylwia Coma Score:                                                                               

16:07 Eye Response: spontaneous(4). Verbal Response: oriented(5). Motor Response: obeys       ma2 

      commands(6). Total: 15.                                                                     

                                                                                                  

MDM:                                                                                              

14:46 Patient medically screened.                                                             ma2 

16:07 Differential diagnosis: cerebral vascular accident, epidural hematoma, hyponatremia,    ma2 

      migraine, subarachnoid bleed, subdural hematoma. Data reviewed: vital signs, nurses         

      notes. Counseling: I had a detailed discussion with the patient and/or guardian             

      regarding: the historical points, exam findings, and any diagnostic results supporting      

      the discharge/admit diagnosis, the presence of at least one elevated blood pressure         

      reading (>120/80) during this emergency department visit. Response to treatment: the        

      patient's symptoms have mildly improved after treatment.                                    

16:09 ED course: patient has subdural hematoma, will transfer for higher level of care as no  ma2 

      neurosurgeon available in our hospital.                                                     

                                                                                                  

                                                                                             

15:07 Order name: CBC with Diff; Complete Time: 16:37                                         ma2 

                                                                                             

15:07 Order name: CMP; Complete Time: 16:17                                                   ma2 

                                                                                             

16:19 Order name: PT-INR                                                                      ma2 

                                                                                             

16:19 Order name: Ptt, Activated                                                              ma2 

                                                                                             

15:07 Order name: Head Brain Wo Cont CT; Complete Time: 16:00                                 ma2 

                                                                                             

16:19 Order name: Protime (+INR)                                                              EDMS

                                                                                             

16:19 Order name: PTT, Activated Partial Thromb                                               EDMS

                                                                                             

18:02 Order name: SARS-COV-2 RT PCR                                                           EDMS

                                                                                             

16:27 Order name: NPO; Complete Time: 18:24                                                   ma2 

                                                                                                  

Administered Medications:                                                                         

15:35 Drug: NS 0.9% 1000 ml Route: IV; Rate: 1 bolus; Site: right forearm;                    bp  

18:55 Follow up: IV Status: Completed infusion; IV Intake: 1000ml                             bp  

15:35 Drug: Reglan 20 mg Route: IVP; Site: right forearm;                                     bp  

16:29 Follow up: Response: Pain is decreased                                                  bp  

15:35 Drug: TORadol 30 mg Route: IVP; Site: right forearm;                                    bp  

16:29 Follow up: Response: Pain is decreased                                                  bp  

15:35 Drug: Benadryl (diphenhydrAMINE) 25 mg Route: IVP; Site: right forearm;                 bp  

16:30 Follow up: Response: Pain is decreased                                                  bp  

16:15 Drug: Labetalol 10 mg Route: IVP; Site: right forearm;                                  bp  

16:31 Follow up: Response: No adverse reaction                                                bp  

16:45 Drug: D5-1/2 NS with KCl 10 mEq/L 1000 ml Route: IV; Rate: 100 ml/hr; Site: right       bp  

      forearm;                                                                                    

18:29 Follow up: IV Status: Infusion continued upon transfer                                  bp  

                                                                                                  

                                                                                                  

Disposition:                                                                                      

21 16:12 Transfer ordered to [facility]. Diagnosis is            

  Nontraumatic subacute subdural hemorrhage.                                                      

- Reason for transfer: Higher level of care.                                                      

- Accepting physician is Dr. Thanh FLETCHER.                                                          

- Condition is Stable.                                                                            

- Problem is new.                                                                                 

- Symptoms are unchanged.                                                                         

                                                                                                  

                                                                                                  

                                                                                                  

Signatures:                                                                                       

Dispatcher [employer]                           EDJem Neal, RN                      RN   bp                                                   

Devon Strong MD MD   ma2                                                  

Estrella Pressley RN                        RN   ca1                                                  

                                                                                                  

Corrections: (The following items were deleted from the chart)                                    

17:07 15:53 CORONAVIRUS+MR.LAB.BRZ ordered. EDMS                                              EDMS

18:56 16:12 2021 16:12 Transfer ordered to [facility].       bp  

      Diagnosis is Nontraumatic subacute subdural hemorrhage. Reason for transfer: Higher         

      level of care. Accepting physician is Dr. Thanh FLETCHER. Condition is Stable. Problem is       

      new. Symptoms are unchanged. ma2                                                            

                                                                                                  

**************************************************************************************************

## 2021-03-29 NOTE — RAD REPORT
EXAM DESCRIPTION:  CT - Head Brain Wo Cont - 3/29/2021 3:40 pm

 

CLINICAL HISTORY:  PAIN

Headache

 

COMPARISON:  Head Brain Wo Cont dated 11/22/2018

 

TECHNIQUE:  All CT scans are performed using dose optimization technique as appropriate and may inclu
de automated exposure control or mA/KV adjustment according to patient size.

 

FINDINGS:  There is a large left-sided subdural hematoma present measuring 20 millimeters in thicknes
s.There is a combination of acute, subacute and chronic blood present within the hematoma.Significant
 left-to-right midline shift is present of 13-14 mm.

 

The paranasal sinuses and mastoids are clear. The calvarium is intact.

 

IMPRESSION:  Large left-sided subdural hematoma is present with 13-14 mm of left-to-right midline rajeev
ft.

 

 The findings were discussed with Dr Strong in the ER on 03/29/2021 at 2:53 p.m. by telephone.

## 2021-04-02 VITALS — DIASTOLIC BLOOD PRESSURE: 83 MMHG | SYSTOLIC BLOOD PRESSURE: 122 MMHG | OXYGEN SATURATION: 93 %

## 2021-04-06 ENCOUNTER — HOSPITAL ENCOUNTER (EMERGENCY)
Dept: HOSPITAL 97 - ER | Age: 56
Discharge: ANOTHER HOSPITAL | End: 2021-04-06
Payer: COMMERCIAL

## 2021-04-06 VITALS — OXYGEN SATURATION: 100 % | DIASTOLIC BLOOD PRESSURE: 86 MMHG | SYSTOLIC BLOOD PRESSURE: 138 MMHG | TEMPERATURE: 97.2 F

## 2021-04-06 DIAGNOSIS — Z20.822: ICD-10-CM

## 2021-04-06 DIAGNOSIS — I10: ICD-10-CM

## 2021-04-06 DIAGNOSIS — I62.00: Primary | ICD-10-CM

## 2021-04-06 LAB
BUN BLD-MCNC: 14 MG/DL (ref 7–18)
GLUCOSE SERPLBLD-MCNC: 99 MG/DL (ref 74–106)
HCT VFR BLD CALC: 40.2 % (ref 39.6–49)
INR BLD: 1.11
LYMPHOCYTES # SPEC AUTO: 1.5 K/UL (ref 0.7–4.9)
MAGNESIUM SERPL-MCNC: 2.4 MG/DL (ref 1.8–2.4)
PMV BLD: 10 FL (ref 7.6–11.3)
POTASSIUM SERPL-SCNC: 4 MMOL/L (ref 3.5–5.1)
RBC # BLD: 4.32 M/UL (ref 4.33–5.43)

## 2021-04-06 PROCEDURE — 36415 COLL VENOUS BLD VENIPUNCTURE: CPT

## 2021-04-06 PROCEDURE — 85025 COMPLETE CBC W/AUTO DIFF WBC: CPT

## 2021-04-06 PROCEDURE — 83735 ASSAY OF MAGNESIUM: CPT

## 2021-04-06 PROCEDURE — 99285 EMERGENCY DEPT VISIT HI MDM: CPT

## 2021-04-06 PROCEDURE — 80048 BASIC METABOLIC PNL TOTAL CA: CPT

## 2021-04-06 PROCEDURE — 85610 PROTHROMBIN TIME: CPT

## 2021-04-06 PROCEDURE — 85730 THROMBOPLASTIN TIME PARTIAL: CPT

## 2021-04-06 PROCEDURE — 70450 CT HEAD/BRAIN W/O DYE: CPT

## 2021-04-06 NOTE — ER
Nurse's Notes                                                                                     

 [facility]                                                                 

Name: Michael Cuellar                                                                                 

Age: 55 yrs                                                                                       

Sex: Male                                                                                         

: 1965                                                                                   

MRN: [id number]                                                                                   

Arrival Date: 2021                                                                          

Time: 11:58                                                                                       

Account#: [id number]                                                                            

Bed 5                                                                                             

Private MD:                                                                                       

Diagnosis: Subdural hemorrhage;Paresthesia of skin                                                

                                                                                                  

Presentation:                                                                                     

                                                                                             

12:05 Coronavirus screen: Client denies travel out of the [place] in the last 14 days. At this   ll1 

      time, the client does not indicate any symptoms associated with coronavirus-19. Ebola       

      Screen: Patient denies travel to an Ebola-affected area in the 21 days before illness       

      onset. Initial Sepsis Screen: Does the patient meet any 2 criteria? No. Patient's           

      initial sepsis screen is negative. Does the patient have a suspected source of              

      infection? No. Patient's initial sepsis screen is negative. Risk Assessment: Do you         

      want to hurt yourself or someone else? Patient reports no desire to harm self or            

      others. Onset of symptoms was 2021.                                               

12:05 Method Of Arrival: Ambulatory                                                           ll1 

12:05 Acuity: LAYLA 3                                                                           ll1 

12:07 Chief complaint: Patient states: L leg numb for 3 days. Numbness is progressing up l    ll1 

      side of body each day. Even his arm feels numb now. No pain. Gait steady. No fever.         

                                                                                                  

Historical:                                                                                       

- Allergies:                                                                                      

12:05 No Known Allergies;                                                                     ll1 

- Home Meds:                                                                                      

12:54 lisinopril 20 mg Oral tab 1 tab once daily [Active];                                    ld1 

- PMHx:                                                                                           

12:05 Hypertension; Brain bleed-craniotomy;                                                   ll1 

                                                                                                  

- Immunization history:: Client reports receiving the 2nd dose of the Covid vaccine,              

  Flu vaccine is up to date. Adult Immunizations up to date.                                      

- Social history:: Smoking status: Patient denies any tobacco usage or history of.                

- Family history:: not pertinent.                                                                 

- Hospitalizations: : Patient was recently seen at.                                               

                                                                                                  

                                                                                                  

Screenin:54 Abuse screen: Denies threats or abuse. Denies injuries from another. Nutritional        ld1 

      screening: No deficits noted. Tuberculosis screening: No symptoms or risk factors           

      identified. Fall Risk IV access (20 points). Total Manley Fall Scale indicates No Risk       

      (0-24 pts).                                                                                 

                                                                                                  

Assessment:                                                                                       

12:47 General: Appears in no apparent distress. comfortable, Behavior is calm, cooperative,   ld1 

      appropriate for age. Pain: Denies pain. Neuro: Reports numbness in left hand, left          

      foot, left arm and left leg since Patient c/o numbness from left leg to left shoulder.      

      States he had a craniotomy three days ago and it is continuously become more numb. It       

      is not continuously numb, it comes and goes. Cardiovascular: Patient's skin is warm and     

      dry. Respiratory: Airway is patent Respiratory effort is even, unlabored, Respiratory       

      pattern is regular, symmetrical. GI: Abdomen is round non-distended, Bowel sounds           

      present X 4 quads. : No signs and/or symptoms were reported regarding the                 

      genitourinary system. EENT: No deficits noted. Derm: No deficits noted.                     

14:02 Reassessment: Patient appears in no apparent distress at this time. No changes from     ld1 

      previously documented assessment. Patient and/or family updated on plan of care and         

      expected duration. Pain level reassessed. Patient is alert, oriented x 3, equal             

      unlabored respirations, skin warm/dry/pink. Patient denies pain at this time.               

15:00 Reassessment: Patient appears in no apparent distress at this time. Patient and/or      hb  

      family updated on plan of care and expected duration. Pain level reassessed. Patient is     

      alert, oriented x 3, equal unlabored respirations, skin warm/dry/pink.                      

                                                                                                  

Vital Signs:                                                                                      

12:05  / 86; Pulse 60; Resp 17; Temp 97.2; Pulse Ox 100% ; Weight 112.49 kg; Height 5   ll1 

      ft. 8 in. (172.72 cm); Pain 0/10;                                                           

12:54  / 78; Pulse 61; Resp 14; Temp 98.8(O); Pulse Ox 100% on R/A; Weight 113.4 kg;    ld1 

      Height 5 ft. 8 in. (172.72 cm); Pain 0/10;                                                  

13:49  / 78; Pulse 86; Resp 16; Pulse Ox 100% ; Pain 0/10;                              ld1 

15:00  / 78; Pulse 59; Resp 18; Pulse Ox 100% on R/A; Pain 0/10;                        ld1 

15:45  / 80; Pulse 60; Resp 19; Pulse Ox 97% ;                                          hb  

12:54 Body Mass Index 38.01 (113.40 kg, 172.72 cm)                                            ld1 

                                                                                                  

ED Course:                                                                                        

11:58 Patient arrived in ED.                                                                  ds1 

12:04 Arm band placed on.                                                                     ll1 

12:07 Triage completed.                                                                       ll1 

12:15 Willis Harper MD is Attending Physician.                                                rn  

12:15 Deyanira Vidal, RN is Primary Nurse.                                                   iw  

12:50 CT Head Brain wo Cont In Process Unspecified.                                           EDMS

12:54 Patient has correct armband on for positive identification. Bed in low position. Call   ld1 

      light in reach. Side rails up X 1.                                                          

12:54 No provider procedures requiring assistance completed.                                  ld1 

13:12 Inserted saline lock: 20 gauge in right forearm, using aseptic technique. Blood         mt  

      collected.                                                                                  

13:14 Inserted saline lock: 20 gauge in right forearm, using aseptic technique. Blood         ld1 

      collected. Missed attempt(s): 20 gauge in right antecubital area.                           

13:48 initiated transfer to [facility].                                              bd  

14:43 pt accepted in transfer to [facility] by dr Corona, admin approval given by elham Goodman.                                                                              

16:05 Patient transferred, IV remains in place.                                               iw  

                                                                                                  

Administered Medications:                                                                         

No medications were administered                                                                  

                                                                                                  

                                                                                                  

Outcome:                                                                                          

14:25 ER care complete, transfer ordered by MD.                                               rn  

16:04 Transferred by ground EMS to [facility].                             ld1 

16:04 Condition: stable                                                                           

16:04 Discharge instructions given to patient, EMS.                                               

16:06 Patient left the ED.                                                                    ld1 

                                                                                                  

Signatures:                                                                                       

Dispatcher [employer]                           EDMS                                                 

Silvia Garcia Demi                                ds1                                                  

Deyanira Vidal, RAMAKRISHNA                     RN   iw                                                   

Willis Harper MD MD rn Baxter, Heather, RN RN hb Thompson, Moriah mt Lewis, Lynsay, RN RN   ll1                                                  

Nida Milian RN                     RN   ld1                                                  

                                                                                                  

**************************************************************************************************

## 2021-04-06 NOTE — RAD REPORT
EXAM DESCRIPTION:  CT - Head Brain Wo Cont - 4/6/2021 12:50 pm

 

CLINICAL HISTORY:  post craniotomy, intermittent left sided tingling

Pain and swelling

 

COMPARISON:  Head Brain Wo Cont dated 3/29/2021; Head Brain Wo Cont dated 11/22/2018

 

TECHNIQUE:  All CT scans are performed using dose optimization technique as appropriate and may inclu
de automated exposure control or mA/KV adjustment according to patient size.

 

FINDINGS:  There has been reduction in the size of the left sided subdural hematoma since prior study
.However, there continues to be a 10 mm crescentic subdural hematoma present on the left containing b
oth air, intermediate density blood and small amount of acute blood superiorly.6-7 mm midline shift i
s present to the right.

 

The paranasal sinuses and mastoids are clear. A left-sided craniotomy is present.

 

IMPRESSION:  There remains present a 10 mm deep crescentic subdural hematoma along the left convexity
.  This contains intermediate density fluid, small amount of acute blood and a small volume of air.

 

Mild midline shift of 6-7 mm to the right is present.

## 2021-04-06 NOTE — XMS REPORT
Continuity of Care Document

                            Created on:2021



Patient:KASANDRA HARGROVE

Sex:Male

:1965

External Reference #:[id number]





Demographics







                          Address                   [address] [place] [address]

 

                          Home Phone                [phone]

 

                          Work Phone                [phone]

 

                          Mobile Phone              [phone]

 

                          Email Address             NONE

 

                          Preferred Language        spa

 

                          Marital Status            Unknown

 

                          Episcopal Affiliation     Unknown

 

                          Race                      Unknown

 

                          Additional Race(s)        White



                                                    Unavailable

 

                          Ethnic Group              Unknown









Author







                          Organization              [facility]

t

 

                          Address                   [address]



                                                    [place] [address]

 

                          Phone                     [phone]









Support







                Name            Relationship    Address         Phone

 

                Polo           Spouse          [address]  [phone]



                                                [place] [address] 









Care Team Providers







                    Name                Role                Phone

 

                    Pcp MD              Primary Care Physician Unavailable

 

                    Christiano CROWLEY           Attending Clinician [phone]

 

                    Eli CROWLEY,  In         Attending Clinician [phone]

 

                    Christine CROWLEY      Attending Clinician [phone]

 

                    Maxime Clark MD   Attending Clinician [phone]

 

                    Cortes CRNA           Attending Clinician [phone]

 

                    CHRISTIANO              Attending Clinician Unavailable

 

                    Darryl CROWLEY         Attending Clinician [phone]

 

                    Doctor Unassigned,  Name Attending Clinician Unavailable

 

                    Gaston CROWLEY,  MAHESH      Attending Clinician [phone]

 

                    CHRISTIANO              Admitting Clinician Unavailable









Payers







           Payer Name Policy Type Policy     Effective Date Expiration Date Sour

ce



                                 Number                           

 

           AMBETTERAMBETTER            [id number] 2021            [facility]                       00:00:00              - 

Medical



           /-Present                                             Center







Problems







       Condition Condition Condition Status Onset  Resolution Last   Treating Co

mments 

Source



       Name   Details Category        Date   Date   Treatment Clinician        



                                                 Date                 

 

       Subdural Subdural Disease Active                       Overview: [facility]

[facility]



       hematoma hematoma               3-                        Added  Michael 

-



                                   00:00:                      [facility]



                                   00                          ally from Center



                                                               request 



                                                               for    



                                                               surgery 



                                                               [id number] 







Allergies, Adverse Reactions, Alerts

This patient has no known allergies or adverse reactions.



Social History







           Social Habit Start Date Stop Date  Quantity   Comments   Source

 

           Sex Assigned At                                             [facility]

 

           Exposure to                       Not sure              [facility] 

-



           SARS-CoV-2 (event)                                             [facility]

l [place]

 

           Alcohol intake 2021 Ex-drinker            [facility]

es -



                      00:00:00   00:00:00   (finding)             Medical Center

 

           Tobacco use and 2021 Never used            [facility]

kes -



           exposure   00:00:00   00:00:00                         Medical Center

 

           History [facility] 2021 3                     [facility]

 -



           Alcohol Frequency 00:00:00   00:00:00                         Medical

 Center

 

           History SDOH 2021 1                     [facility]

 -



           Alcohol Std Drinks 00:00:00   00:00:00                         Medica

l Center

 

           History [facility] 2021 1                     [facility]

 -



           Alcohol Binge 00:00:00   00:00:00                         [facility]

ter









                Smoking Status  Start Date      Stop Date       Source

 

                Never smoker                                    [facility]  kes - M

edical Center







Medications







       Ordered Filled Start  Stop   Current Ordering Indication Dosage Frequency

 Signature

                    Comments            Components          Source



     Medication Medication Date Date Medication? Clinician                (SIG) 

          



     Name Name                                                   

 

     HYDROcodone      2021- Yes            1{tbl}      Take 1           C

[facility]



     -acetaminop                                tablet by           Julieta daniels (NORCO      00:00: 23:59                          mouth           Medic

al



     5-325)      00   :00                           every 8           Center



     5-325 mg                                         (eight)           



     per tablet                                         hours as           



                                                  needed for           



                                                  Pain for           



                                                  up to 6           



                                                  days. Max           



                                                  Daily           



                                                  Amount: 3           



                                                  tablets           

 

     ibuprofen      2021- No        headache 800mg      Take 800         

  [facility]



     (ADVIL,MOTR      3-22 04-03           disorder           mg by           Julieta GORDILLO) 800 MG      00:00: 00:00                          mouth           Medic

al



     tablet      00   :00                           every 6           Center



                                                  (six)           



                                                  hours as           



                                                  needed for           



                                                  Pain For           



                                                  headaches,           



                                                  took for           



                                                  two days           



                                                  every 6           



                                                  hours           



                                                  only. Per           



                                                  NP Haley Neri .           

 

     lisinopriL      2020      Yes            20mg QD   Take 20 mg           C

HI [facility]



     (PRINIVIL,Z                                     by mouth           David

s -



     ESTRIL) 20      00:00:                               daily.           Medic

al



     MG tablet      00                                                Center







Vital Signs







             Vital Name   Observation Time Observation Value Comments     Source

 

             Systolic blood 2021 08:51:00 123 mm[Hg[facility]                [facility]



             pressure                                            [facility] Center

 

             Diastolic blood 2021 08:51:00 76 mm[Hg]                 [facility]

 

             Heart rate   2021 08:51:00 89 /min                   [facility]

 

             Body temperature 2021 08:51:00 36.56 Judith                 [facility]

 

             Respiratory rate 2021 08:51:00 20 /min                   [facility]

 

             Oxygen saturation in 2021 08:51:00 100 /min                  

[facility] -



             Arterial blood by                                        Medical Ce

nter



             Pulse oximetry                                        

 

             Body height  2021 23:10:00 172.7 cm                  [facility]

 

             Body weight  2021 23:10:00 107.14 kg                 [facility]

 

             BMI          2021 23:10:00 35.92 kg/m2               [facility]







Procedures







                Procedure       Date / Time Performed Performing Clinician [facility]

e

 

                CT BRAIN WITHOUT IV 2021 10:11:00 Abner Benitez In    [facility]

 

                BASIC METABOLIC PANEL 2021 05:07:00 Gary Yee [address]

 

                MAGNESIUM       2021 05:07:00 Gary Yee [facility]

 

                PHOSPHORUS      2021 05:07:00 Gary Yee [facility]

 

                CBC W/PLT COUNT & AUTO 2021 05:06:00 Gary Yee

[facility]

 

                BASIC METABOLIC PANEL 2021 03:52:00 Gary Yee [address]

 

                CBC W/PLT COUNT & AUTO 2021 03:52:00 Gary Yee

[facility]

 

                MAGNESIUM       2021 03:52:00 Gary Yee [facility]

 

                PHOSPHORUS      2021 03:52:00 Gary Yee [facility]

 

                CBC W/PLT COUNT & AUTO 2021 04:12:00 Gary Yee

[facility]

 

                BASIC METABOLIC PANEL 2021 03:33:00 Gary Yee [facility]

I [address]

 

                MAGNESIUM       2021 03:33:00 Gary Yee [facility]

 

                PHOSPHORUS      2021 03:33:00 Gary Yee [facility]

 

                CT BRAIN WITHOUT IV 2021 04:12:00 [facility]

 

                BASIC METABOLIC PANEL 2021 03:48:00 Gary Yee [facility]

I [address]

 

                CBC W/PLT COUNT & AUTO 2021 03:48:00 Gary Yee

[facility]

 

                MAGNESIUM       2021 03:48:00 Gary Yee [facility]

 

                PHOSPHORUS      2021 03:48:00 Gary Yee [facility]

 

                CRANIECTOMY/    2021 07:33:00 Jose A King [facility] -



                CRANIOTOMY,EVACUATION                                 Medical Ce

nter



                HEMATOMA                                        

 

                CT BRAIN WITHOUT IV 2021 04:28:00 Kvng [facility]

 

                BASIC METABOLIC PANEL 2021 03:47:00 Gary Yee [facility]

I [address]

 

                CBC W/PLT COUNT & AUTO 2021 03:47:00 Gary Yee

[facility]

 

                MAGNESIUM       2021 03:47:00 Gary Yee [facility]

 

                PHOSPHORUS      2021 03:47:00 Gary Yee [facility]

 

                LIPID PANEL     2021 03:47:00 [facility]

 

                TSH/FREE T4 IF INDICATED 2021 03:47:00 [facility]

 

                TROPONIN I      [facility]21 [facility]47:[facility]

 

                ABORH, MANUAL   2021 21:51:00 Reyes, Meredith Anne [facility]

 

                SARS-COV2/RT-PCR ([facility] [place] [facility] 2021 21:17:00 [facility]



                REF [facility])                                       [facility]

 

                BASIC METABOLIC PANEL 2021 21:16:00 [facility]



                (7)                                             [facility]

 

                CBC W/PLT COUNT & AUTO 2021 21:16:00 Maria Luisa Schreiber 

I [facility]



                DIFFERENTIAL                                    [facility]

 

                MAGNESIUM       2021 21:16:00 Abdoul [facility]

 

                PHOSPHORUS      2021 21:16:00 Elmer [facility]

 

                PROTHROMBIN TIME/INR 2021 21:16:00 Elmer [facility]

 

                APTT            2021 21:16:00 Abdoul [facility]

 

                FIBRINOGEN      2021 21:16:00 Abdoul [facility]

 

                TYPE AND SCREEN, 2021 21:16:00 Elmer [facility]



                AUTOMATED                                       [facility]

 

                ECG 12-LEAD     2021 21:15:02 [facility]

 

                XR CHEST 1 VIEW 2021 20:38:00 Elmer [facility]



                PORTABLE/BEDSIDE                                 Medical Center







Plan of Care







             Planned Activity Planned Date Details      Comments     Source

 

             Future Scheduled 2024   Lipid panel               [facility]

s -



             Test         00:00:00     (procedure) [code =              [facility] 

Center



                                       [id number]]                 

 

             Future Scheduled 2021   INFLUENZA VACCINE              [facility]



             Test         00:00:00     (Season Ended) [code              Medical

 Center



                                       = INFLUENZA VACCINE              



                                       (Season Ended)]              

 

             Future Scheduled 2016-10-11   SHINGLES VACCINES (1              [facility]



             Test         00:00:00     of 2) [code =              Medical Center



                                       SHINGLES VACCINES (1              



                                       of 2)]                    

 

             Future Scheduled 1985-10-11   DTAP/TDAP/TD              [facility]

s -



             Test         00:00:00     VACCINES (1 - Tdap)              Medical 

Center



                                       [code = DTAP/TDAP/TD              



                                       VACCINES (1 - Tdap)]              

 

             Future Scheduled 1984-10-11   HEPATITIS C               [facility]

s -



             Test         00:00:00     SCREENING [code =              Medical Ce

nter



                                       HEPATITIS C               



                                       SCREENING]                

 

             Future Scheduled 1965   Screening for              [facility]

es -



             Test         00:00:00     malignant neoplasm              Medical C

enter



                                       of colon (procedure)              



                                       [code = 727483980]              







Encounters







        Start   End     Encounter Admission Attending Care    Care    Encounter 

Source



        Date/Time Date/Time Type    Type    Clinicians Facility Department ID   

   

 

        2020-11-10 2020-11-10 [facility]         SOPHIA Andrews [id number]

[id number] 



        14:33:00 23:59:00 Encounter         Ricardo SOLER       [id number]         



                                                BUILDING [id number]         



                                                        [phone]         



                                                        031             

 

        2020-11-10 2020-11-10 Orders          Doctor  SANDRA    [id number]

[id number] 



        00:00:00 00:00:00 Only            Unassigned, BELEM   [id number]       

  



                                        [facility] [id number]         



                                                        [phone]         



                                                        009             

 

        2020 Office          [facility]    [id number]

133 



        11:17:09 12:03:56 Visit           [employer]  [id number]        

 



                                                [place] [id number]         



                                                Rodri [phone]         



                                                nal     044             



                                                Office                  



                                                Building                 



                                                One                     







Results







           Test Description Test Time  Test Comments Results    Result     [facility]

e



                                                       Comments   

 

           CT, BRAIN, 2021 Unlisted   *******************            



           WITHOUT CONTRAST 10:26:00   Reason for *******************           

 



                                 Exam - Click *******************            



                                 Yes and Enter ***[facility] -            



                                 Reason     [facility]:            



                                 Below->KASANDRA Boyle Reason  : 1965            



                                 for Exam->[facility]     Sex:              



                                 follow up  M******************            



                                 imaging    *******************            



                                            *******************            



                                            ****FINAL REPORT            



                                            PATIENT ID:            



                                            [id number] CT, BRAIN,            



                                            WITHOUT CONTRAST            



                                            CLINICAL              



                                            INDICATION:            



                                            Subdural              



                                            hemorrhage,            



                                            follow-upSDH follow            



                                            up imaging            



                                            COMPARISON: 2021 TECHNIQUE:            



                                             Noncontrast axial            



                                            CT imaging of the            



                                            brain and skull.            



                                            DOSE REDUCTION:            



                                            Dose modulation,            



                                            iterative             



                                            reconstruction,            



                                            and/or weight-based            



                                            adjustment of the            



                                            mA/kV was utilized            



                                            to reduce the            



                                            radiation dose to            



                                            as low as             



                                            reasonably            



                                            achievable.            



                                            FINDINGS:Status            



                                            post left frontal            



                                            parietal craniotomy            



                                            for drainage of            



                                            subdural hematoma.            



                                            Subdural drain has            



                                            been removed in the            



                                            interim. Residual            



                                            left cerebral            



                                            convexity mixed            



                                            density hematoma            



                                            and pneumocephalus            



                                            is not                



                                            significantly            



                                            changed in            



                                            thickness,            



                                            measuring up to 2.2            



                                            cm. Contralateral            



                                            mixed density right            



                                            cerebral convexity            



                                            extra-axial            



                                            hematoma is            



                                            similarly             



                                            unchanged,            



                                            measuring up to 6            



                                            mm. Midline shift            



                                            is not                



                                            significantly            



                                            changed in the            



                                            interim given            



                                            differences in            



                                            angulation and            



                                            technique,            



                                            approximately 6 mm            



                                            rightward midline            



                                            shift. No             



                                            herniation. Mild            



                                            effacement of the            



                                            left lateral            



                                            ventricle is            



                                            unchanged. No            



                                            hydrocephalus.            



                                            Orbits are within            



                                            normal limits. No            



                                            obstructive            



                                            paranasal sinus            



                                            disease.              



                                            IMPRESSION:            



                                            Interval removal of            



                                            left subdural            



                                            drain. Otherwise,            



                                            no significant            



                                            interval change in            



                                            bilateral cerebral            



                                            convexity mixed            



                                            density subdural            



                                            hematomas and mild            



                                            consequent            



                                            rightward midline            



                                            shift. If there is            



                                            persistent clinical            



                                            concern for            



                                            intracranial            



                                            pathology, MR            



                                            examination is            



                                            recommended for            



                                            further               



                                            characterization.            



                                            Signed: Dell Laura MDReport            



                                            Verified Date/Time:            



                                             2021            



                                            10:26:18 Reading            



                                            Location: [address] [facility] Reading            



                                            Room                  



                                            Electronically            



                                            signed by: DELL LAURA MD on 2021            



                                            10:26 AM              

 

           CT brain without 2021            Interface, External           

 [facility]



           IV contrast 10:26:00              Ris In - 2021            - Me

dical



                                            [facility]



                                            [facility] PATIENT ID:            



                                             [id number] CT,            



                                            BRAIN, WITHOUT            



                                            CONTRAST CLINICAL            



                                            INDICATION:            



                                            Subdural              



                                            hemorrhage,            



                                            follow-upSDH follow            



                                            up imaging            



                                            COMPARISON: 2021 TECHNIQUE:            



                                             Noncontrast axial            



                                            CT imaging of the            



                                            brain and skull.            



                                            DOSE REDUCTION:            



                                            Dose modulation,            



                                            iterative             



                                            reconstruction,            



                                            and/or weight-based            



                                            adjustment of the            



                                            mA/kV was utilized            



                                            to reduce the            



                                            radiation dose to            



                                            as low as             



                                            reasonably            



                                            achievable.            



                                            FINDINGS:Status            



                                            post left frontal            



                                            parietal craniotomy            



                                            for drainage of            



                                            subdural hematoma.            



                                            Subdural drain has            



                                            been removed in the            



                                            interim. Residual            



                                            left cerebral            



                                            convexity mixed            



                                            density hematoma            



                                            and pneumocephalus            



                                            is not                



                                            significantly            



                                            changed in            



                                            thickness,            



                                            measuring up to 2.2            



                                            cm. Contralateral            



                                            mixed density right            



                                            cerebral convexity            



                                            extra-axial            



                                            hematoma is            



                                            similarly             



                                            unchanged,            



                                            measuring up to 6            



                                            mm. Midline shift            



                                            is not                



                                            significantly            



                                            changed in the            



                                            interim given            



                                            differences in            



                                            angulation and            



                                            technique,            



                                            approximately 6 mm            



                                            rightward midline            



                                            shift. No             



                                            herniation. Mild            



                                            effacement of the            



                                            left lateral            



                                            ventricle is            



                                            unchanged. No            



                                            hydrocephalus.            



                                            Orbits are within            



                                            normal limits. No            



                                            obstructive            



                                            paranasal sinus            



                                            disease.              



                                            IMPRESSION:            



                                            Interval removal of            



                                            left subdural            



                                            drain. Otherwise,            



                                            no significant            



                                            interval change in            



                                            bilateral cerebral            



                                            convexity mixed            



                                            density subdural            



                                            hematomas and mild            



                                            consequent            



                                            rightward midline            



                                            shift. If there is            



                                            persistent clinical            



                                            concern for            



                                            intracranial            



                                            pathology, MR            



                                            examination is            



                                            recommended for            



                                            further               



                                            characterization.            



                                            Signed: Dell Lauraort            



                                            Verified Date/Time:            



                                             2021            



                                            10:26:18 Reading            



                                            Location: [address] [facility] Reading            



                                            Room                  



                                            Electronically            



                                            signed by: DELL LAURA MD on 2021            



                                            10:26 AM              









                    Basic Metabolic Panel 2021 06:08:00 









                      Test Item  Value      Reference Range Interpretation Comme

nts









             Sodium (test code = 2951-2) 134 meq/L    136-145      L            

 

             Potassium (test code = 4.0 meq/L    3.5-5.1                   



             2823-3)                                             

 

             Chloride (test code = 99 meq/L                         



             2075-0)                                             

 

             CO2 (test code = -9) 25 meq/L     22-29                     

 

             BUN (test code = 3094-0) 14 mg/dL     7-21                      

 

             Creatinine (test code = 0.99 mg/dL   0.57-1.25                 



             2160-0)                                             

 

             Glucose (test code = 107 mg/dL           H            



             2345-7)                                             

 

             Calcium (test code = 9.4 mg/dL    8.4-10.2                  



             41625-9)                                            

 

             EGFR (test code = 81911-1) 79           mL/min/1.73 sq m           

   ESTIMATED GFR IS NOT



                                                                 AS ACCURATE AS



                                                                 CREATININE DEISY LIEBERMAN



                                                                 IN PREDICTING



                                                                 GLOMERULAR FILT

RATION



                                                                 RATE. ESTIMATED

 GFR IS



                                                                 NOT APPLICABLE 

FOR



                                                                 DIALYSIS PATIEN

TS.

 

             SCOOTER (test code = SCOOTER)  ID - DB                           

 

             Lab Interpretation (test Abnormal                               



             code = 78274-2)                                        



[facility]





             Test Item    Value        Reference Range Interpretation Comments

 

             Magnesium (test code = 2.0 mg/dL    1.6-2.6                   



             62990-8)                                            

 

             SCOOTER (test code = SCOOTER)  ID - DB                           

 

             Lab Interpretation (test Normal                                 



             code = 23125-5)                                        



[facility]





             Test Item    Value        Reference Range Interpretation Comments

 

             Phosphorus (test code = 3.8 mg/dL    2.3-4.7                   



             2777-1)                                             

 

             SCOOTER (test code = SCOOTER)  ID - DB                           

 

             Lab Interpretation (test Normal                                 



             code = 42580-1)                                        



[id number]:00





             Test Item    Value        Reference Range Interpretation Comments

 

             SODIUM (BEAKER) 134 meq/L    136-145      L            



             (test code = 381)                                        

 

             POTASSIUM (BEAKER) 4.0 meq/L    3.5-5.1                   



             (test code = 379)                                        

 

             CHLORIDE (BEAKER) 99 meq/L                         



             (test code = 382)                                        

 

             CO2 (BEAKER) (test 25 meq/L     22-29                     



             code = 355)                                         

 

             BLOOD UREA NITROGEN 14 mg/dL     7-21                      



             (BEAKER) (test code                                        



             = 354)                                              

 

             CREATININE (BEAKER) 0.99 mg/dL   0.57-1.25                 



             (test code = 358)                                        

 

             GLUCOSE RANDOM 107 mg/dL           H            



             (BEAKER) (test code                                        



             = 652)                                              

 

             CALCIUM (BEAKER) 9.4 mg/dL    8.4-10.2                  



             (test code = 697)                                        

 

             EGFR (BEAKER) (test 79 mL/min/1.73                           ESTIMA

THALIA GFR IS



             code = 1092) sq m                                   NOT AS ACCURATE

 AS



                                                                 CREATININE



                                                                 CLEARANCE IN



                                                                 PREDICTING



                                                                 GLOMERULAR



                                                                 FILTRATION RATE

.



                                                                 ESTIMATED GFR I

S



                                                                 NOT APPLICABLE 

FOR



                                                                 DIALYSIS PATIEN

TS.



 ID - [id number] 06:08:00





             Test Item    Value        Reference Range Interpretation Comments

 

             MAGNESIUM (BEAKER) (test code = 2.0 mg/dL    1.6-2.6               

    



             627)                                                



 ID - [id number] 06:08:00





             Test Item    Value        Reference Range Interpretation Comments

 

             PHOSPHORUS (BEAKER) (test code = 3.8 mg/dL    2.3-4.7              

     



             604)                                                



 ID - DBCBC with platelet count + automated [id number] 05:37:00





             Test Item    Value        Reference Range Interpretation Comments

 

             WBC (test code = 6690-2) 7.9          See_Comment                [A

utomated message]



                                                                 The system [employer]



                                                                 generated this 

result



                                                                 transmitted ref

erence



                                                                 range: 3.5 - 10

.5



                                                                 K/L. The refe

rence



                                                                 range was not u

sed to



                                                                 interpret this 

result



                                                                 as normal/abnor

mal.

 

             RBC (test code = 789-8) 4.60         See_Comment  L             [Au

tomated message]



                                                                 The system [employer]



                                                                 generated this 

result



                                                                 transmitted ref

erence



                                                                 range: 4.63 - 6

.08



                                                                 M/L. The refe

rence



                                                                 range was not u

sed to



                                                                 interpret this 

result



                                                                 as normal/abnor

mal.

 

             MCHC (test code = 786-4) 34.4         See_Comment                [A

utomated message]



                                                                 The system [employer]



                                                                 generated this 

result



                                                                 transmitted ref

erence



                                                                 range: 32.3 - 3

6.5



                                                                 GM/DL. The refe

rence



                                                                 range was not u

sed to



                                                                 interpret this 

result



                                                                 as normal/abnor

mal.

 

             Hematocrit (test code = 42.1 %       40.1-51                   



             4544-3)                                             

 

             MCV (test code = 787-2) 91.5 fL      79-92.2                   

 

             MCH (test code = 785-6) 31.5 pg      25.7-32.2                 

 

             RDW (test code = 788-0) 12.9 %       11.6-14.4                 

 

             Platelets (test code = 196          See_Comment                [Aut

omated message]



             777-3)                                              The system [employer]



                                                                 generated this 

result



                                                                 transmitted ref

erence



                                                                 range: 150 - 45

0 K/CU



                                                                 MM. The referen

ce



                                                                 range was not u

sed to



                                                                 interpret this 

result



                                                                 as normal/abnor

mal.

 

             MPV (test code = 11.8 fL      9.4-12.4                  



             74391-7)                                            

 

             nRBC (test code = 413) 0            See_Comment                [Aut

omated message]



                                                                 The system [employer]



                                                                 generated this 

result



                                                                 transmitted ref

erence



                                                                 range: 0 - 0 /1

00



                                                                 WBC. The refere

nce



                                                                 range was not u

sed to



                                                                 interpret this 

result



                                                                 as normal/abnor

mal.

 

             % Neutros (test code = 60 %                                   



             429)                                                

 

             % Lymphs (test code = 28 %                                   



             430)                                                

 

             % Monos (test code = 8 %                                    



             431)                                                

 

             % Eos (test code = 432) 2 %                                    

 

             % Baso (test code = 437) 1 %                                    

 

             # Neutros (test code = 4.74         See_Comment                [Aut

omated message]



             670)                                                The system [employer]



                                                                 generated this 

result



                                                                 transmitted ref

erence



                                                                 range: 1.78 - 5

.38



                                                                 K/L. The refe

rence



                                                                 range was not u

sed to



                                                                 interpret this 

result



                                                                 as normal/abnor

mal.

 

             # Lymphs (test code = 2.20         See_Comment                [Auto

mated message]



             414)                                                The system [employer]



                                                                 generated this 

result



                                                                 transmitted ref

erence



                                                                 range: 1.32 - 3

.57



                                                                 K/L. The refe

rence



                                                                 range was not u

sed to



                                                                 interpret this 

result



                                                                 as normal/abnor

mal.

 

             # Monos (test code = 0.66         See_Comment                [Autom

ated message]



             415)                                                The system [employer]



                                                                 generated this 

result



                                                                 transmitted ref

erence



                                                                 range: 0.30 - 0

.82



                                                                 K/L. The refe

rence



                                                                 range was not u

sed to



                                                                 interpret this 

result



                                                                 as normal/abnor

mal.

 

             # Eos (test code = 416) 0.18         See_Comment                [Au

tomated message]



                                                                 The system [employer]



                                                                 generated this 

result



                                                                 transmitted ref

erence



                                                                 range: 0.04 - 0

.54



                                                                 K/L. The refe

rence



                                                                 range was not u

sed to



                                                                 interpret this 

result



                                                                 as normal/abnor

mal.

 

             # Baso (test code = 417) 0.05         See_Comment                [A

utomated message]



                                                                 The system [employer]



                                                                 generated this 

result



                                                                 transmitted ref

erence



                                                                 range: 0.01 - 0

.08



                                                                 K/L. The refe

rence



                                                                 range was not u

sed to



                                                                 interpret this 

result



                                                                 as normal/abnor

mal.

 

             Immature     0 %          0-1                       



             Granulocytes-Relative                                        



             (test code = 2801)                                        

 

             Lab Interpretation (test Abnormal                               



             code = 22966-4)                                        



[facility] W/PLT COUNT &amp; AUTO [id number] 
05:37:00





             Test Item    Value        Reference Range Interpretation Comments

 

             WHITE BLOOD CELL COUNT (BEAKER) 7.9 K/ L     3.5-10.5              

    



             (test code = 775)                                        

 

             RED BLOOD CELL COUNT (BEAKER) 4.60 M/ L    4.63-6.08    L          

  



             (test code = 761)                                        

 

             HEMOGLOBIN (BEAKER) (test code = 14.5 GM/DL   13.7-17.5            

     



             410)                                                

 

             HEMATOCRIT (BEAKER) (test code = 42.1 %       40.1-51.0            

     



             411)                                                

 

             MEAN CORPUSCULAR VOLUME (BEAKER) 91.5 fL      79.0-92.2            

     



             (test code = 753)                                        

 

             MEAN CORPUSCULAR HEMOGLOBIN 31.5 pg      25.7-32.2                 



             (BEAKER) (test code = 751)                                        

 

             MEAN CORPUSCULAR HEMOGLOBIN CONC 34.4 GM/DL   32.3-36.5            

     



             (BEAKER) (test code = 752)                                        

 

             RED CELL DISTRIBUTION WIDTH 12.9 %       11.6-14.4                 



             (BEAKER) (test code = 412)                                        

 

             PLATELET COUNT (BEAKER) (test 196 K/CU MM  150-450                 

  



             code = 756)                                         

 

             MEAN PLATELET VOLUME (BEAKER) 11.8 fL      9.4-12.4                

  



             (test code = 754)                                        

 

             NUCLEATED RED BLOOD CELLS 0 /100 WBC   0-0                       



             (BEAKER) (test code = 413)                                        

 

             NEUTROPHILS RELATIVE PERCENT 60 %                                  

 



             (BEAKER) (test code = 429)                                        

 

             LYMPHOCYTES RELATIVE PERCENT 28 %                                  

 



             (BEAKER) (test code = 430)                                        

 

             MONOCYTES RELATIVE PERCENT 8 %                                    



             (BEAKER) (test code = 431)                                        

 

             EOSINOPHILS RELATIVE PERCENT 2 %                                   

 



             (BEAKER) (test code = 432)                                        

 

             BASOPHILS RELATIVE PERCENT 1 %                                    



             (BEAKER) (test code = 437)                                        

 

             NEUTROPHILS ABSOLUTE COUNT 4.74 K/ L    1.78-5.38                 



             (BEAKER) (test code = 670)                                        

 

             LYMPHOCYTES ABSOLUTE COUNT 2.20 K/ L    1.32-3.57                 



             (BEAKER) (test code = 414)                                        

 

             MONOCYTES ABSOLUTE COUNT (BEAKER) 0.66 K/ L    0.30-0.82           

      



             (test code = 415)                                        

 

             EOSINOPHILS ABSOLUTE COUNT 0.18 K/ L    0.04-0.54                 



             (BEAKER) (test code = 416)                                        

 

             BASOPHILS ABSOLUTE COUNT (BEAKER) 0.05 K/ L    0.01-0.08           

      



             (test code = 417)                                        

 

             IMMATURE GRANULOCYTES-RELATIVE 0 %          0-1                    

   



             PERCENT (BEAKER) (test code =                                      

  



             2801)                                               



BASIC METABOLIC [id number] 04:37:00





             Test Item    Value        Reference Range Interpretation Comments

 

             SODIUM (BEAKER) 135 meq/L    136-145      L            



             (test code = 381)                                        

 

             POTASSIUM (BEAKER) 4.0 meq/L    3.5-5.1                   



             (test code = 379)                                        

 

             CHLORIDE (BEAKER) 100 meq/L                        



             (test code = 382)                                        

 

             CO2 (BEAKER) (test 25 meq/L     22-29                     



             code = 355)                                         

 

             BLOOD UREA NITROGEN 15 mg/dL     7-21                      



             (BEAKER) (test code                                        



             = 354)                                              

 

             CREATININE (BEAKER) 0.94 mg/dL   0.57-1.25                 



             (test code = 358)                                        

 

             GLUCOSE RANDOM 108 mg/dL           H            



             (BEAKER) (test code                                        



             = 652)                                              

 

             CALCIUM (BEAKER) 9.1 mg/dL    8.4-10.2                  



             (test code = 697)                                        

 

             EGFR (BEAKER) (test 84 mL/min/1.73                           ESTIMA

THALIA GFR IS



             code = 1092) sq m                                   NOT AS ACCURATE

 AS



                                                                 CREATININE



                                                                 CLEARANCE IN



                                                                 PREDICTING



                                                                 GLOMERULAR



                                                                 FILTRATION RATE

.



                                                                 ESTIMATED GFR I

S



                                                                 NOT APPLICABLE 

FOR



                                                                 DIALYSIS PATIEN

TS.



 ID - [id number] 04:37:00





             Test Item    Value        Reference Range Interpretation Comments

 

             MAGNESIUM (BEAKER) (test code = 2.1 mg/dL    1.6-2.6               

    



             627)                                                



 ID - [id number] 04:37:00





             Test Item    Value        Reference Range Interpretation Comments

 

             PHOSPHORUS (BEAKER) (test code = 3.7 mg/dL    2.3-4.7              

     



             604)                                                



 ID - EDASICBC W/PLT COUNT &amp; AUTO [id number] 04:25:00





             Test Item    Value        Reference Range Interpretation Comments

 

             WHITE BLOOD CELL COUNT (BEAKER) 6.7 K/ L     3.5-10.5              

    



             (test code = 775)                                        

 

             RED BLOOD CELL COUNT (BEAKER) 4.59 M/ L    4.63-6.08    L          

  



             (test code = 761)                                        

 

             HEMOGLOBIN (BEAKER) (test code = 14.5 GM/DL   13.7-17.5            

     



             410)                                                

 

             HEMATOCRIT (BEAKER) (test code = 43.7 %       40.1-51.0            

     



             411)                                                

 

             MEAN CORPUSCULAR VOLUME (BEAKER) 95.2 fL      79.0-92.2    H       

     



             (test code = 753)                                        

 

             MEAN CORPUSCULAR HEMOGLOBIN 31.6 pg      25.7-32.2                 



             (BEAKER) (test code = 751)                                        

 

             MEAN CORPUSCULAR HEMOGLOBIN CONC 33.2 GM/DL   32.3-36.5            

     



             (BEAKER) (test code = 752)                                        

 

             RED CELL DISTRIBUTION WIDTH 12.9 %       11.6-14.4                 



             (BEAKER) (test code = 412)                                        

 

             PLATELET COUNT (BEAKER) (test 175 K/CU MM  150-450                 

  



             code = 756)                                         

 

             MEAN PLATELET VOLUME (BEAKER) 11.6 fL      9.4-12.4                

  



             (test code = 754)                                        

 

             NUCLEATED RED BLOOD CELLS 0 /100 WBC   0-0                       



             (BEAKER) (test code = 413)                                        

 

             NEUTROPHILS RELATIVE PERCENT 60 %                                  

 



             (BEAKER) (test code = 429)                                        

 

             LYMPHOCYTES RELATIVE PERCENT 27 %                                  

 



             (BEAKER) (test code = 430)                                        

 

             MONOCYTES RELATIVE PERCENT 10 %                                   



             (BEAKER) (test code = 431)                                        

 

             EOSINOPHILS RELATIVE PERCENT 2 %                                   

 



             (BEAKER) (test code = 432)                                        

 

             BASOPHILS RELATIVE PERCENT 1 %                                    



             (BEAKER) (test code = 437)                                        

 

             NEUTROPHILS ABSOLUTE COUNT 4.00 K/ L    1.78-5.38                 



             (BEAKER) (test code = 670)                                        

 

             LYMPHOCYTES ABSOLUTE COUNT 1.79 K/ L    1.32-3.57                 



             (BEAKER) (test code = 414)                                        

 

             MONOCYTES ABSOLUTE COUNT (BEAKER) 0.69 K/ L    0.30-0.82           

      



             (test code = 415)                                        

 

             EOSINOPHILS ABSOLUTE COUNT 0.15 K/ L    0.04-0.54                 



             (BEAKER) (test code = 416)                                        

 

             BASOPHILS ABSOLUTE COUNT (BEAKER) 0.04 K/ L    0.01-0.08           

      



             (test code = 417)                                        

 

             IMMATURE GRANULOCYTES-RELATIVE 0 %          0-1                    

   



             PERCENT (BEAKER) (test code =                                      

  



             2801)                                               



CBC W/PLT COUNT &amp; AUTO [id number] 04:36:00





             Test Item    Value        Reference Range Interpretation Comments

 

             WHITE BLOOD CELL COUNT (BEAKER) 7.9 K/ L     3.5-10.5              

    



             (test code = 775)                                        

 

             RED BLOOD CELL COUNT (BEAKER) 4.26 M/ L    4.63-6.08    L          

  



             (test code = 761)                                        

 

             HEMOGLOBIN (BEAKER) (test code = 13.5 GM/DL   13.7-17.5    L       

     



             410)                                                

 

             HEMATOCRIT (BEAKER) (test code = 39.8 %       40.1-51.0    L       

     



             411)                                                

 

             MEAN CORPUSCULAR VOLUME (BEAKER) 93.4 fL      79.0-92.2    H       

     



             (test code = 753)                                        

 

             MEAN CORPUSCULAR HEMOGLOBIN 31.7 pg      25.7-32.2                 



             (BEAKER) (test code = 751)                                        

 

             MEAN CORPUSCULAR HEMOGLOBIN CONC 33.9 GM/DL   32.3-36.5            

     



             (BEAKER) (test code = 752)                                        

 

             RED CELL DISTRIBUTION WIDTH 13.2 %       11.6-14.4                 



             (BEAKER) (test code = 412)                                        

 

             PLATELET COUNT (BEAKER) (test 132 K/CU MM  150-450      L          

  



             code = 756)                                         

 

             MEAN PLATELET VOLUME (BEAKER) 11.1 fL      9.4-12.4                

  



             (test code = 754)                                        

 

             NUCLEATED RED BLOOD CELLS 0 /100 WBC   0-0                       



             (BEAKER) (test code = 413)                                        

 

             NEUTROPHILS RELATIVE PERCENT 64 %                                  

 



             (BEAKER) (test code = 429)                                        

 

             LYMPHOCYTES RELATIVE PERCENT 23 %                                  

 



             (BEAKER) (test code = 430)                                        

 

             MONOCYTES RELATIVE PERCENT 10 %                                   



             (BEAKER) (test code = 431)                                        

 

             EOSINOPHILS RELATIVE PERCENT 2 %                                   

 



             (BEAKER) (test code = 432)                                        

 

             BASOPHILS RELATIVE PERCENT 0 %                                    



             (BEAKER) (test code = 437)                                        

 

             NEUTROPHILS ABSOLUTE COUNT 5.11 K/ L    1.78-5.38                 



             (BEAKER) (test code = 670)                                        

 

             LYMPHOCYTES ABSOLUTE COUNT 1.86 K/ L    1.32-3.57                 



             (BEAKER) (test code = 414)                                        

 

             MONOCYTES ABSOLUTE COUNT (BEAKER) 0.77 K/ L    0.30-0.82           

      



             (test code = 415)                                        

 

             EOSINOPHILS ABSOLUTE COUNT 0.15 K/ L    0.04-0.54                 



             (BEAKER) (test code = 416)                                        

 

             BASOPHILS ABSOLUTE COUNT (BEAKER) 0.03 K/ L    0.01-0.08           

      



             (test code = 417)                                        

 

             IMMATURE GRANULOCYTES-RELATIVE 0 %          0-1                    

   



             PERCENT (BEAKER) (test code =                                      

  



             2801)                                               



[id number] 03:54:00





             Test Item    Value        Reference Range Interpretation Comments

 

             MAGNESIUM (BEAKER) 2.0 mg/dL    1.6-2.6                   Specimen 

slightly



             (test code = 627)                                        hemolyzed



 ID - LOGAN [id number] 03:54:00





             Test Item    Value        Reference Range Interpretation Comments

 

             PHOSPHORUS (BEAKER) 3.4 mg/dL    2.3-4.7                   Specimen

 slightly



             (test code = 604)                                        hemolyzed



 ID - LOGAN MBASIC METABOLIC [id number] 03:54:00





             Test Item    Value        Reference Range Interpretation Comments

 

             SODIUM (BEAKER) 134 meq/L    136-145      L            



             (test code = 381)                                        

 

             POTASSIUM (BEAKER) 4.0 meq/L    3.5-5.1                   Specimen 

slightly



             (test code = 379)                                        hemolyzed

 

             CHLORIDE (BEAKER) 103 meq/L                        



             (test code = 382)                                        

 

             CO2 (BEAKER) (test 20 meq/L     22-29        L            



             code = 355)                                         

 

             BLOOD UREA NITROGEN 16 mg/dL     7-21                      



             (BEAKER) (test code                                        



             = 354)                                              

 

             CREATININE (BEAKER) 0.92 mg/dL   0.57-1.25                 Specimen

 slightly



             (test code = 358)                                        hemolyzed

 

             GLUCOSE RANDOM 99 mg/dL                         



             (BEAKER) (test code                                        



             = 652)                                              

 

             CALCIUM (BEAKER) 8.7 mg/dL    8.4-10.2                  



             (test code = 697)                                        

 

             EGFR (BEAKER) (test 86 mL/min/1.73                           ESTIMA

THALIA GFR IS



             code = 1092) sq m                                   NOT AS ACCURATE

 AS



                                                                 CREATININE



                                                                 CLEARANCE IN



                                                                 PREDICTING



                                                                 GLOMERULAR



                                                                 FILTRATION RATE

.



                                                                 ESTIMATED GFR I

S



                                                                 NOT APPLICABLE 

FOR



                                                                 DIALYSIS PATIEN

TS.



 ID - LOGAN MCT, BRAIN, WITHOUT [id number] 07:26:00Unlisted 
Reason for Exam - Click Yes and Enter Reason Below-&gt;No
[facility]         : 1965        Sex: 
M************************************************************FINAL REPORT 
PATIENT ID:   [id number] CT Head without contrast CLINICAL HISTORY: Cerebral 
hemorrhage suspected TECHNIQUE: Contiguous axial CT images through the head 
without contrast. This exam was performed according to the departmental dose 
optimization program which includes automated exposure control, adjustment of 
the mA and/or kV according to the patient size, and/or use of an iterative 
reconstruction technique. COMPARISON: 3/30/2021 FINDINGS: There is interval 
left-sided craniotomy and extra-axial drainage catheter placement with 
subsegmental decrease in size of the left cerebral convexity subdural hematoma 
now measuring 0.8 cm in thickness, previously 2.1 cm. Pneumocephalus is 
compatible with the recent postsurgical status. There is decreased mass effect 
compressing the left lateral ventricle with decreased rightward midline shift of
a cavum septum pellucidum measuring 0.5 cm, previously 1.0 cm. Asymmetric 
dilatation of the right temporal horn has subsided. Medial left uncal deviation 
and effacement of the ambient cisterns is also decreased.  There is a new right 
cerebral vertex subdural hemorrhage measuring 4 mm in thickness, without 
significant mass effect. There is no CT evidence for acute infarction. 
IMPRESSION: Since 3/30/2021, status post decompression of the left-sided subdura
l hematoma with substantially decreased mass effect. Interval development of a 
thin right cerebral vertex subdural hemorrhage measuring 4 mm in thickness, for 
which attention on follow-up is recommended. Signed: Theodora Marroquin MDReport 
Verified Date/Time:  2021 07:26:39 Reading Location: [address] Neuro 
Reading Room      Electronically signed by: THEODORA MARROQUIN M.D. on 2021 
07:26 AMBASIC METABOLIC [id number] 04:40:00





             Test Item    Value        Reference Range Interpretation Comments

 

             SODIUM (BEAKER) 135 meq/L    136-145      L            



             (test code = 381)                                        

 

             POTASSIUM (BEAKER) 4.0 meq/L    3.5-5.1                   



             (test code = 379)                                        

 

             CHLORIDE (BEAKER) 103 meq/L                        



             (test code = 382)                                        

 

             CO2 (BEAKER) (test 24 meq/L     22-29                     



             code = 355)                                         

 

             BLOOD UREA NITROGEN 14 mg/dL     7-21                      



             (BEAKER) (test code                                        



             = 354)                                              

 

             CREATININE (BEAKER) 1.00 mg/dL   0.57-1.25                 



             (test code = 358)                                        

 

             GLUCOSE RANDOM 113 mg/dL           H            



             (BEAKER) (test code                                        



             = 652)                                              

 

             CALCIUM (BEAKER) 8.9 mg/dL    8.4-10.2                  



             (test code = 697)                                        

 

             EGFR (BEAKER) (test 78 mL/min/1.73                           ESTIMA

THALIA GFR IS



             code = 1092) sq m                                   NOT AS ACCURATE

 AS



                                                                 CREATININE



                                                                 CLEARANCE IN



                                                                 PREDICTING



                                                                 GLOMERULAR



                                                                 FILTRATION RATE

.



                                                                 ESTIMATED GFR I

S



                                                                 NOT APPLICABLE 

FOR



                                                                 DIALYSIS PATIEN

TS.



 ID - LOGAN [id number] 04:40:00





             Test Item    Value        Reference Range Interpretation Comments

 

             MAGNESIUM (BEAKER) (test code = 1.9 mg/dL    1.6-2.6               

    



             627)                                                



 ID - LOGAN [id number] 04:40:00





             Test Item    Value        Reference Range Interpretation Comments

 

             PHOSPHORUS (BEAKER) (test code = 3.5 mg/dL    2.3-4.7              

     



             604)                                                



 ID - LOGAN MCBC W/PLT COUNT &amp; AUTO [id number] 04:27:00





             Test Item    Value        Reference Range Interpretation Comments

 

             WHITE BLOOD CELL COUNT (BEAKER) 10.8 K/ L    3.5-10.5     H        

    



             (test code = 775)                                        

 

             RED BLOOD CELL COUNT (BEAKER) 4.48 M/ L    4.63-6.08    L          

  



             (test code = 761)                                        

 

             HEMOGLOBIN (BEAKER) (test code = 14.2 GM/DL   13.7-17.5            

     



             410)                                                

 

             HEMATOCRIT (BEAKER) (test code = 41.9 %       40.1-51.0            

     



             411)                                                

 

             MEAN CORPUSCULAR VOLUME (BEAKER) 93.5 fL      79.0-92.2    H       

     



             (test code = 753)                                        

 

             MEAN CORPUSCULAR HEMOGLOBIN 31.7 pg      25.7-32.2                 



             (BEAKER) (test code = 751)                                        

 

             MEAN CORPUSCULAR HEMOGLOBIN CONC 33.9 GM/DL   32.3-36.5            

     



             (BEAKER) (test code = 752)                                        

 

             RED CELL DISTRIBUTION WIDTH 12.9 %       11.6-14.4                 



             (BEAKER) (test code = 412)                                        

 

             PLATELET COUNT (BEAKER) (test 157 K/CU MM  150-450                 

  



             code = 756)                                         

 

             MEAN PLATELET VOLUME (BEAKER) 11.8 fL      9.4-12.4                

  



             (test code = 754)                                        

 

             NUCLEATED RED BLOOD CELLS 0 /100 WBC   0-0                       



             (BEAKER) (test code = 413)                                        

 

             NEUTROPHILS RELATIVE PERCENT 78 %                                  

 



             (BEAKER) (test code = 429)                                        

 

             LYMPHOCYTES RELATIVE PERCENT 13 %                                  

 



             (BEAKER) (test code = 430)                                        

 

             MONOCYTES RELATIVE PERCENT 8 %                                    



             (BEAKER) (test code = 431)                                        

 

             EOSINOPHILS RELATIVE PERCENT 0 %                                   

 



             (BEAKER) (test code = 432)                                        

 

             BASOPHILS RELATIVE PERCENT 0 %                                    



             (BEAKER) (test code = 437)                                        

 

             NEUTROPHILS ABSOLUTE COUNT 8.44 K/ L    1.78-5.38    H            



             (BEAKER) (test code = 670)                                        

 

             LYMPHOCYTES ABSOLUTE COUNT 1.40 K/ L    1.32-3.57                 



             (BEAKER) (test code = 414)                                        

 

             MONOCYTES ABSOLUTE COUNT (BEAKER) 0.91 K/ L    0.30-0.82    H      

      



             (test code = 415)                                        

 

             EOSINOPHILS ABSOLUTE COUNT 0.01 K/ L    0.04-0.54    L            



             (BEAKER) (test code = 416)                                        

 

             BASOPHILS ABSOLUTE COUNT (BEAKER) 0.01 K/ L    0.01-0.08           

      



             (test code = 417)                                        

 

             IMMATURE GRANULOCYTES-RELATIVE 0 %          0-1                    

   



             PERCENT (BEAKER) (test code =                                      

  



             2801)                                               



ECG 12 [id number] 13:59:49Interface, External Ris In - 2021  1:59 PM 
CDTVentricular Rate 50 BPMAtrial Rate 50 BPMP-R Interval 194 msQRS Duration 94 
msQ-T Interval 430 msQTC Calculation(Bazett) 392 msP Axis 31 degreesR Axis -7 
degreesT Axis 6 degreesSinus bradycardiaOtherwise normal ECGNo previous ECGs 
availableConfirmed by MD Amezcua Roberto ([id number]) on 3/30/2021 1:59:44 PM
[facility] [id number] 08:07:00





             Test Item    Value        Reference Range Interpretation Comments

 

             TSH (test code = 2.822        See_Comment                [Automated



             94396-1)                                            message] The



                                                                 system which



                                                                 generated this



                                                                 result transmit

thalia



                                                                 reference range

:



                                                                 0.350 - 4.940



                                                                 uIU/mL. The



                                                                 reference range



                                                                 was not used to



                                                                 interpret this



                                                                 result as



                                                                 normal/abnormal

.

 

             SCOOTER (test code = SCOOTER)  ID -                           



                          CAMILLA L                                

 

             Lab Interpretation Normal                                 



             (test code = 61359-8)                                        



[facility] [id number] 08:07:00





             Test Item    Value        Reference Range Interpretation Comments

 

             THYROID STIMULATING HORMONE 2.822 uIU/mL 0.350-4.940               



             (BEAKER) (test code = 772)                                        



 ID - CAMILLA LCT, BRAIN, WITHOUT [id number] 08:05:00Followup from
[place] scan. Previously read as 2cm diameter with 12-13mm MLSUnlisted Reason
for Exam - Click Yes and Enter Reason Below-&gt;No
************************************************************[facility]         : 1965        Sex: 
M************************************************************FINAL REPORT 
PATIENT ID:   [id number] CT Head without contrast CLINICAL HISTORY: Subdural 
hematoma TECHNIQUE: Contiguous axial CT images through the head without 
contrast. This exam was performed according to the departmental dose 
optimization program which includes automated exposure control, adjustment of 
the mA and/or kV according to the patient size, and/or use of an iterative 
reconstruction technique. COMPARISON: None FINDINGS: There is a left cerebral 
convexity subdural hematoma measuring 2.1cm in thickness with a layering 
hematocrit level. There is mass effect compressing the left cerebralhemisphere 
and narrowing the left lateral ventricle. There is rightward midline shift of 
the septum pellucidum by 1.0 cm. Asymmetric dilatation of the right temporal 
horn is compatible with early/partial entrapment. Medial left uncal deviation is
also seen effacing the bilateral ambient cisterns with rightward brainstem 
displacement. There is no CT evidence for acute ischemia. There is no skull 
fracture. IMPRESSION: Left cerebral convexity subdural hematoma with mass effect
and rightward midline shift including uncal deviation. Asymmetric dilatation of 
the right temporal horn compatible with early/partial ventricular entrapment. 
Given the stated history of subdural hematoma, comparison with outside imaging 
is recommended to determine stability or change. Signed: Theodora Marroquin MDReport 
Verified Date/Time:  2021 08:05:59 Reading Location: [facility] C013V Neuro 
Reading Room      Electronically signed by: THEODORA MARROQUIN M.D. on 2021 
08:05 Jerald -10-10 04:24:00





             Test Item    Value        Reference Range Interpretation Comments

 

             Troponin I (test code = <0.01        0-0.03                    



             08828-7)                                            

 

             SCOOTER (test code = SCOOTER) Troponin I (TnI)                           



                          levels must be                           



                          interpreted in the                           



                          context of the                           



                          presenting symptoms                           



                          and the clinical                           



                          findings. Elevated                           



                          TnI levels indicate                           



                          myocardial damage,                           



                          but are not specific                           



                          for ischemic heart                           



                          disease. Elevated TnI                           



                          levels are seen in                           



                          patients with other                           



                          cardiac conditions                           



                          (including                             



                          myocarditis and                           



                          congestive heart                           



                          failure), and slight                           



                          TnI elevations occur                           



                          in patients with                           



                          other conditions,                           



                          including sepsis,                           



                          renal failure,                           



                          acidosis, acute                           



                          neurological disease,                           



                          and persistent                           



                          tachyarrhythmia.Opera                           



                          tor ID - CAMILLA HOWELL                           

 

             Lab Interpretation (test Normal                                 



             code = 01051-3)                                        



[facility][id number] 04[facility]24[facility]





             Test Item    Value        Reference Range Interpretation Comments

 

             TROPONIN I (BEAKER) (test code = 397) < ng/mL      0.00-0.03       

          



Troponin I (TnI) levels must be interpreted in the context of the presenting 
symptoms and the clinical findings. Elevated TnI levels indicate myocardial 
damage, but are not specific for ischemic heart disease. Elevated TnI levels are
seen in patients with other cardiac conditions (including myocarditis and 
congestive heart failure), and slight TnI elevations occur in patients with 
other conditions, including sepsis, renal failure, acidosis, acute neurological 
disease, and persistent tachyarrhythmia. ELVIRA MACEDOipid panel
2021 04:22:00





             Test Item    Value        Reference Range Interpretation Comments

 

             Triglycerides (test 184 mg/dL                              



             code = 2571-8)                                        

 

             Cholesterol (test code 193 mg/dL                              



             = 2093-3)                                           

 

             HDL (test code = 34 mg/dL                               



             2085-9)                                             

 

             LDL Calculated (test 122 mg/dL                              



             code = 63150-3)                                        

 

             SCOOTER (test code = SCOOTER) Triglyceride Reference                       

    



                          Range:   Low Risk                           



                             <150   Borderline                           



                           150-199   High Risk                           



                            200-499   Very High                           



                          Risk  >=500                            



                          Cholesterol Reference                           



                          Range:   Low Risk                           



                             <200   Borderline                           



                           200-239    High Risk                           



                                >240 HDL                           



                          Cholesterol Reference                           



                          Range:   Low Risk                           



                             >=60   High Risk                           



                               <40 LDL                           



                          Cholesterol Reference                           



                          Range:   Optimal                           



                             <100   Near Optimal                           



                           100-129   Borderline                           



                            130-159   High                           



                             160-189   Very High                           



                                >=190                            



                           ID - PIAYA L                           



[facility] [id number] 04:22:00





             Test Item    Value        Reference Range Interpretation Comments

 

             SODIUM (BEAKER) 138 meq/L    136-145                   



             (test code = 381)                                        

 

             POTASSIUM (BEAKER) 4.2 meq/L    3.5-5.1                   



             (test code = 379)                                        

 

             CHLORIDE (BEAKER) 107 meq/L                        



             (test code = 382)                                        

 

             CO2 (BEAKER) (test 24 meq/L     22-29                     



             code = 355)                                         

 

             BLOOD UREA NITROGEN 15 mg/dL     7-21                      



             (BEAKER) (test code                                        



             = 354)                                              

 

             CREATININE (BEAKER) 0.92 mg/dL   0.57-1.25                 



             (test code = 358)                                        

 

             GLUCOSE RANDOM 97 mg/dL                         



             (BEAKER) (test code                                        



             = 652)                                              

 

             CALCIUM (BEAKER) 8.3 mg/dL    8.4-10.2     L            



             (test code = 697)                                        

 

             EGFR (BEAKER) (test 86 mL/min/1.73                           ESTIMA

THALIA GFR IS



             code = 1092) sq m                                   NOT AS ACCURATE

 AS



                                                                 CREATININE



                                                                 CLEARANCE IN



                                                                 PREDICTING



                                                                 GLOMERULAR



                                                                 FILTRATION RATE

.



                                                                 ESTIMATED GFR I

S



                                                                 NOT APPLICABLE 

FOR



                                                                 DIALYSIS PATIEN

TS.



 ID - CAMILLA [id number] 04:22:00





             Test Item    Value        Reference Range Interpretation Comments

 

             MAGNESIUM (BEAKER) (test code = 2.1 mg/dL    1.6-2.6               

    



             627)                                                



 ID - CAMILLA [id number] 04:22:00





             Test Item    Value        Reference Range Interpretation Comments

 

             PHOSPHORUS (BEAKER) (test code = 3.7 mg/dL    2.3-4.7              

     



             604)                                                



 ID - CAMILLA LLIPID [id number] 04:22:00





             Test Item    Value        Reference Range Interpretation Comments

 

             TRIGLYCERIDES (BEAKER) (test code = 184 mg/dL                      

        



             540)                                                

 

             CHOLESTEROL (BEAKER) (test code = 193 mg/dL                        

      



             631)                                                

 

             HDL CHOLESTEROL (BEAKER) (test code 34 mg/dL                       

        



             = 976)                                              

 

             LDL CHOLESTEROL CALCULATED (BEAKER) 122 mg/dL                      

        



             (test code = 633)                                        



Triglyceride Reference Range:   Low Risk         &lt;150   Borderline    150-199
  High Risk     200-499   Very High Risk  &gt;=500Cholesterol Reference Range:  
Low Risk         &lt;200   Borderline 200-239    High Risk        &gt;240HDL 
Cholesterol Reference Range:   Low Risk         &gt;=60   High Risk         
&lt;40LDL Cholesterol Reference Range:   Optimal          &lt;100   Near Optimal
 100-129   Borderline    130-159   High          160-189   Very High       
&gt;=190    ID - ANIAAYALCBC W/PLT COUNT &amp; AUTO [id number]
04:02:00





             Test Item    Value        Reference Range Interpretation Comments

 

             WHITE BLOOD CELL COUNT (BEAKER) 5.6 K/ L     3.5-10.5              

    



             (test code = 775)                                        

 

             RED BLOOD CELL COUNT (BEAKER) 4.29 M/ L    4.63-6.08    L          

  



             (test code = 761)                                        

 

             HEMOGLOBIN (BEAKER) (test code = 13.6 GM/DL   13.7-17.5    L       

     



             410)                                                

 

             HEMATOCRIT (BEAKER) (test code = 40.0 %       40.1-51.0    L       

     



             411)                                                

 

             MEAN CORPUSCULAR VOLUME (BEAKER) 93.2 fL      79.0-92.2    H       

     



             (test code = 753)                                        

 

             MEAN CORPUSCULAR HEMOGLOBIN 31.7 pg      25.7-32.2                 



             (BEAKER) (test code = 751)                                        

 

             MEAN CORPUSCULAR HEMOGLOBIN CONC 34.0 GM/DL   32.3-36.5            

     



             (BEAKER) (test code = 752)                                        

 

             RED CELL DISTRIBUTION WIDTH 13.0 %       11.6-14.4                 



             (BEAKER) (test code = 412)                                        

 

             PLATELET COUNT (BEAKER) (test 119 K/CU MM  150-450      L          

  



             code = 756)                                         

 

             MEAN PLATELET VOLUME (BEAKER) 11.5 fL      9.4-12.4                

  



             (test code = 754)                                        

 

             NUCLEATED RED BLOOD CELLS 0 /100 WBC   0-0                       



             (BEAKER) (test code = 413)                                        

 

             NEUTROPHILS RELATIVE PERCENT 60 %                                  

 



             (BEAKER) (test code = 429)                                        

 

             LYMPHOCYTES RELATIVE PERCENT 29 %                                  

 



             (BEAKER) (test code = 430)                                        

 

             MONOCYTES RELATIVE PERCENT 7 %                                    



             (BEAKER) (test code = 431)                                        

 

             EOSINOPHILS RELATIVE PERCENT 3 %                                   

 



             (BEAKER) (test code = 432)                                        

 

             BASOPHILS RELATIVE PERCENT 1 %                                    



             (BEAKER) (test code = 437)                                        

 

             NEUTROPHILS ABSOLUTE COUNT 3.33 K/ L    1.78-5.38                 



             (BEAKER) (test code = 670)                                        

 

             LYMPHOCYTES ABSOLUTE COUNT 1.61 K/ L    1.32-3.57                 



             (BEAKER) (test code = 414)                                        

 

             MONOCYTES ABSOLUTE COUNT (BEAKER) 0.40 K/ L    0.30-0.82           

      



             (test code = 415)                                        

 

             EOSINOPHILS ABSOLUTE COUNT 0.17 K/ L    0.04-0.54                 



             (BEAKER) (test code = 416)                                        

 

             BASOPHILS ABSOLUTE COUNT (BEAKER) 0.03 K/ L    0.01-0.08           

      



             (test code = 417)                                        

 

             IMMATURE GRANULOCYTES-RELATIVE 0 %          0-1                    

   



             PERCENT (BEAKER) (test code =                                      

  



             2801)                                               



SARS-CoV2/RT-PCR (Asymptomatic ONLY)2021 23:03:00





             Test Item    Value        Reference Range Interpretation Comments

 

             SARS-COV2/RT-PCR Negative     Not Detected,              



             (test code =              Negative, See              



             09319-9)                  external report              



                                       for linked test              

 

             SARS-COV-2   [facility]                                  



             PERFORMING LAB                                        



             (test code =                                        



             15870-6)                                            

 

             SCOOTER (test code = Negative results do not                           



             SCOOTER)         preclude SARS-CoV-2                           



                          infection and should not                           



                          be used as the sole                           



                          basis for patient                           



                          management decisions.                           



                          Negative results must be                           



                          combined with clinical                           



                          observations, patient                           



                          history, and                           



                          epidemiological                           



                          information. A false                           



                          negative result may                           



                          occur if a specimen is                           



                          improperly collected,                           



                          transported or handled.                           



                          The limit of detection                           



                          for this assay is 250                           



                          copies/mL. This SARS                           



                          CoV-2 test is a rapid,                           



                          real-time RT-PCR test                           



                          intended for the                           



                          qualitative detection of                           



                          nucleic acid from                           



                          SARS-CoV-2 in a                           



                          nasopharyngeal swab                           



                          specimen collected from                           



                          individuals suspected of                           



                          COVID-19 by their                           



                          healthcare provider.                           



                          This test has not been                           



                          Food and Drug                           



                          Administration (FDA)                           



                          cleared or approved and                           



                          has been authorized by                           



                          FDA under an Emergency                           



                          Use Authorization (EUA).                           



                          This EUA will be                           



                          effective until the                           



                          declaration that                           



                          circumstances exist                           



                          justifying the                           



                          authorization of the                           



                          emergency use of in                           



                          vitro diagnostic tests                           



                          for detection and/or                           



                          diagnosis of COVID-19 is                           



                          terminated under Section                           



                          564(b)(2) of the Act or                           



                          the EUA is revoked under                           



                          Section 564(g) of the                           



                          Act. Fact Sheet for                           



                          Healthcare                             



                          Providers:https://www.ce                           



                          pheid.com/Documents/Xper                           



                          t%20Xpress%20SARS%20CoV-                           



                          2/Fact%20Sheets/906-2892                           



                          %01XARO-UFI-6%20HEALTHCA                           



                          RE%20PROVIDERS%20FACT%20                           



                          SHEET.pdf Fact Sheet for                           



                          Healthcare                             



                          Patients:https://www.SchoolEdge Mobileid.com/Documents/Xpert                           



                          %20Xpress%20SARS%20CoV-2                           



                          /Fact%20Sheets/169-3801%                           



                          47OECL-SWA-6%20PATIENT%2                           



                          0FACT%20SHEET.pdf                           



                          Performing                             



                          Laboratory:[facility] [place] [address]                           



[facility] [place] &[facility]; REF LABS)2021 
23:03:00





             Test Item    Value        Reference Range Interpretation Comments

 

             SARS-COV2/RT-PCR (test code Negative     Not Detected, Negative,   

           



             = 1110213)                See external report for              



                                       linked test               

 

             SARS-COV-2 PERFORMING LAB [facility]                                  



             (test code = 5115687)                                        



Negative results do not preclude SARS-CoV-2 infection and should not be used as 
the sole basis for patient management decisions. Negative results must be 
combined with clinical observations, patient history, and epidemiological 
information. A false negative result may occur if a specimen is improperly
collected, transported or handled.The limit of detection for this assay is 250 
copies/mL.This SARS CoV-2 test is a rapid, real-time RT-PCR test intended for 
the qualitative detection of nucleic acid from SARS-CoV-2 in a nasopharyngeal 
swab specimen collected from individuals suspected of COVID-19 by their 
healthcare provider.This test has not been Food and Drug Administration (FDA) 
cleared or approved and has been authorized by FDA under an Emergency Use 
Authorization (EUA). This EUA will be effective until the declaration that 
circumstances exist justifying the authorization of the emergency use of in 
vitro diagnostic tests for detection and/or diagnosis of COVID-19 is terminated 
under Section 564(b)(2) of the Act or the EUA is revoked under Section 564(g) of
the Act.Fact Sheet for Healthcare Pro
viders:https://www.Mempile.com/Documents/Xpert%20Xpress%20SARS%20CoV-2/Fact%20Sh

eets/302-6202%59URYM-HPM-3%20HEALTHCARE%20PROVIDERS%20FACT%20SHEET.pdfFact Sheet
for Healthcare Patients:https://www.Sketchfab
id.citibuddies/Documents/Xpert%20Xpress%20SARS%20CoV-2/Fact%20Sheets/3023801%20SARS-COV

-2%20PATIENT%20FACT%20SHEET.pdfPerforming Laboratory:[facility], [place] [address]SDPPF, [id number] 22:13:00





             Test Item    Value        Reference Range Interpretation Comments

 

             ABO Grouping (test code = 2588) O                                  

    

 

             Rh Factor (test code = 2589) POS                                   

 



[facility] [id number]:59:00





             Test Item    Value        Reference Range Interpretation Comments

 

             ABO/RH AUTOMATED (BEAKER) (test O POSITIVE                         

    



             code = 2260)                                        

 

             Ab Scrn (test code = 890-4) NEGATIVE                               



[facility] 21[facility]56[facility]





             Test Item    Value        Reference Range Interpretation Comments

 

             Fibrinogen (test code = 3255-7) 289 mg/dl    225-434               

    

 

             Lab Interpretation (test code = Normal                             

    



             59968-6)                                            



[facility]





             Test Item    Value        Reference Range Interpretation Comments

 

             PTT (test code = 25271-4) 32.8         See_Comment                [

Automated message]



                                                                 The system myRet[employer]



                                                                 generated this 

result



                                                                 transmitted ref

erence



                                                                 range: 22.5 - 3

6.0



                                                                 seconds. The re

ference



                                                                 range was not u

sed to



                                                                 interpret this 

result



                                                                 as normal/abnor

mal.

 

             Lab Interpretation (test Normal                                 



             code = 83660-7)                                        



[facility]56[facility]





             Test Item    Value        Reference Range Interpretation Comments

 

             PARTIAL THROMBOPLASTIN TIME 32.8 seconds 22.5-36.0                 



             (BEAKER) (test code = 760)                                        



[id number] 21:56:00





             Test Item    Value        Reference Range Interpretation Comments

 

             FIBRINOGEN LEVEL (BEAKER) (test 289 mg/dl    225-434               

    



             code = 658)                                         



Prothrombin time/[id number] 21:55:00





             Test Item    Value        Reference    Interpretation Comments



                                       Range                     

 

             Protime (test code = 13.7         See_Comment                [Autom

ated



             5902-2)                                             message] The



                                                                 system which



                                                                 generated this



                                                                 result



                                                                 transmitted



                                                                 reference range

:



                                                                 11.9 - 14.2



                                                                 seconds. The



                                                                 reference range



                                                                 was not used to



                                                                 interpret this



                                                                 result as



                                                                 normal/abnormal

.

 

             INR (test code = 1.10         See_Comment                [Automated



             6301-6)                                             message] The



                                                                 system which



                                                                 generated this



                                                                 result



                                                                 transmitted



                                                                 reference range

:



                                                                 <=5.90. The



                                                                 reference range



                                                                 was not used to



                                                                 interpret this



                                                                 result as



                                                                 normal/abnormal

.

 

             SCOOTER (test code = Effective 2019:                           



             SCOOTER)         PT Reference Range                           



                          ChangeNew: 11.9-14.2                           



                           Previous: 11.7-14.7                           



                          RECOMMENDED                            



                          COUMADIN/WARFARIN                           



                          INR THERAPY                            



                          RANGESSTANDARD DOSE:                           



                          2.0-3.0  Includes:                           



                          PROPHYLAXIS for                           



                          venous thrombosis,                           



                          systemic                               



                          embolization;                           



                          TREATMENT for venous                           



                          thrombosis and/or                           



                          pulmonary                              



                          embolus.HIGH RISK:                           



                          Target INR is                           



                          2.5-3.5 for patients                           



                          wiht mechanical                           



                          heart valves.                           

 

             Lab Interpretation Normal                                 



             (test code =                                        



             67188-9)                                            



[facility] HospitalPROTHROMBIN TIME/[id number] 21:55:00





             Test Item    Value        Reference Range Interpretation Comments

 

             PROTIME (BEAKER) 13.7 seconds 11.9-14.2                 



             (test code = 759)                                        

 

             INR (BEAKER) (test 1.10         See_Comment                [Automat

ed message]



             code = 370)                                         The system [employer]



                                                                 generated this 

result



                                                                 transmitted ref

erence



                                                                 range: <=5.90. 

The



                                                                 reference range

 was



                                                                 not used to int

erpret



                                                                 this result as



                                                                 normal/abnormal

.



Effective 2019: PT Reference Range ChangeNew: 11.9-14.2  Previous: 11.7-
14.7RECOMMENDED COUMADIN/WARFARIN INR THERAPY RANGESSTANDARD DOSE: 2.0-3.0  
Includes: PROPHYLAXIS for venous thrombosis, systemic embolization; TREATMENT 
for venous thrombosis and/or pulmonary embolus.HIGH RISK: Target INR is2.5-3.5 
for patients wiht mechanical heart valves.BASIC METABOLIC [id number] 
21:45:00





             Test Item    Value        Reference Range Interpretation Comments

 

             SODIUM (BEAKER) 140 meq/L    136-145                   



             (test code = 381)                                        

 

             POTASSIUM (BEAKER) 4.2 meq/L    3.5-5.1                   



             (test code = 379)                                        

 

             CHLORIDE (BEAKER) 107 meq/L                        



             (test code = 382)                                        

 

             CO2 (BEAKER) (test 24 meq/L     22-29                     



             code = 355)                                         

 

             BLOOD UREA NITROGEN 13 mg/dL     7-21                      



             (BEAKER) (test code                                        



             = 354)                                              

 

             CREATININE (BEAKER) 0.93 mg/dL   0.57-1.25                 



             (test code = 358)                                        

 

             GLUCOSE RANDOM 92 mg/dL                         



             (BEAKER) (test code                                        



             = 652)                                              

 

             CALCIUM (BEAKER) 8.9 mg/dL    8.4-10.2                  



             (test code = 697)                                        

 

             EGFR (BEAKER) (test 85 mL/min/1.73                           ESTIMA

THALIA GFR IS



             code = 1092) sq m                                   NOT AS ACCURATE

 AS



                                                                 CREATININE



                                                                 CLEARANCE IN



                                                                 PREDICTING



                                                                 GLOMERULAR



                                                                 FILTRATION RATE

.



                                                                 ESTIMATED GFR I

S



                                                                 NOT APPLICABLE 

FOR



                                                                 DIALYSIS PATIEN

TS.



 ID - [id number] 21:45:00





             Test Item    Value        Reference Range Interpretation Comments

 

             MAGNESIUM (BEAKER) (test code = 2.0 mg/dL    1.6-2.6               

    



             627)                                                



 ID - [id number] 21:45:00





             Test Item    Value        Reference Range Interpretation Comments

 

             PHOSPHORUS (BEAKER) (test code = 3.8 mg/dL    2.3-4.7              

     



             604)                                                



 ID - DBCBC W/PLT COUNT &amp; AUTO [id number] 21:29:00





             Test Item    Value        Reference Range Interpretation Comments

 

             WHITE BLOOD CELL COUNT (BEAKER) 5.5 K/ L     3.5-10.5              

    



             (test code = 775)                                        

 

             RED BLOOD CELL COUNT (BEAKER) 4.46 M/ L    4.63-6.08    L          

  



             (test code = 761)                                        

 

             HEMOGLOBIN (BEAKER) (test code = 14.1 GM/DL   13.7-17.5            

     



             410)                                                

 

             HEMATOCRIT (BEAKER) (test code = 42.2 %       40.1-51.0            

     



             411)                                                

 

             MEAN CORPUSCULAR VOLUME (BEAKER) 94.6 fL      79.0-92.2    H       

     



             (test code = 753)                                        

 

             MEAN CORPUSCULAR HEMOGLOBIN 31.6 pg      25.7-32.2                 



             (BEAKER) (test code = 751)                                        

 

             MEAN CORPUSCULAR HEMOGLOBIN CONC 33.4 GM/DL   32.3-36.5            

     



             (BEAKER) (test code = 752)                                        

 

             RED CELL DISTRIBUTION WIDTH 12.7 %       11.6-14.4                 



             (BEAKER) (test code = 412)                                        

 

             PLATELET COUNT (BEAKER) (test 121 K/CU MM  150-450      L          

  



             code = 756)                                         

 

             MEAN PLATELET VOLUME (BEAKER) 12.0 fL      9.4-12.4                

  



             (test code = 754)                                        

 

             NUCLEATED RED BLOOD CELLS 0 /100 WBC   0-0                       



             (BEAKER) (test code = 413)                                        

 

             NEUTROPHILS RELATIVE PERCENT 55 %                                  

 



             (BEAKER) (test code = 429)                                        

 

             LYMPHOCYTES RELATIVE PERCENT 34 %                                  

 



             (BEAKER) (test code = 430)                                        

 

             MONOCYTES RELATIVE PERCENT 7 %                                    



             (BEAKER) (test code = 431)                                        

 

             EOSINOPHILS RELATIVE PERCENT 4 %                                   

 



             (BEAKER) (test code = 432)                                        

 

             BASOPHILS RELATIVE PERCENT 1 %                                    



             (BEAKER) (test code = 437)                                        

 

             NEUTROPHILS ABSOLUTE COUNT 3.03 K/ L    1.78-5.38                 



             (BEAKER) (test code = 670)                                        

 

             LYMPHOCYTES ABSOLUTE COUNT 1.84 K/ L    1.32-3.57                 



             (BEAKER) (test code = 414)                                        

 

             MONOCYTES ABSOLUTE COUNT (BEAKER) 0.39 K/ L    0.30-0.82           

      



             (test code = 415)                                        

 

             EOSINOPHILS ABSOLUTE COUNT 0.19 K/ L    0.04-0.54                 



             (BEAKER) (test code = 416)                                        

 

             BASOPHILS ABSOLUTE COUNT (BEAKER) 0.03 K/ L    0.01-0.08           

      



             (test code = 417)                                        

 

             IMMATURE GRANULOCYTES-RELATIVE 0 %          0-1                    

   



             PERCENT (BEAKER) (test code =                                      

  



             2801)                                               



RAD, CHEST, 1 VIEW, NON [id number] 21:19:00Reason for exam:-&gt;SDHShould 
this be performed at the bedside?-&gt;Yes
************************************************************[facility]         : 1965        Sex: 
M************************************************************FINAL REPORT 
PATIENT ID:   [id number]  Chest one view. Clinical history: SDH Comparison: None. 
Technique: A single frontal view of the chest was obtained.  Findings: The 
cardiac silhouette is normal in size. The aorta is tortuous and atherosclerotic.
There is no focal pulmonary consolidation, pleuraleffusion or pneumothorax. 
There is no pulmonary edema. There are mild degenerative changes of the miguel a
ateral acromioclavicular joints. There is a small right subacromial spur. 
Impression: No focal pulmonary consolidation. Signed: Chase Rhoades [facility] Medicine[facility] 
Verified Date/Time:  2021 21:19:03     Electronically signed by: CHASE RHOADES MD on 2021 09:19 PMXR chest 1 view portable / bedside
2021 21:19:00Interface, External Ris In - 2021  9:21 PM CDTFINAL 
REPORT PATIENT ID:   [id number]  Chest one view. Clinical history: SDH Comparison:
None. Technique: A single frontal view of the chest was obtained.  Findings: The
cardiac silhouette is normal in size. The aorta is tortuous and atherosclerotic.
There is no focal pulmonary consolidation, pleural effusion or pneumothorax. 
There is no pulmonary edema. There are mild degenerative changes of the 
bilateral acromioclavicular joints. There is a small right subacromial spur. 
Impression: No focal pulmonary consolidation. Signed: Chase Rhoades
rified Date/Time:  2021 21:19:03     Electronically signed by: CHASE RHOADES MD on 2021 09:19 [facility]

## 2021-04-06 NOTE — EDPHYS
Physician Documentation                                                                           

 [facility]                                                                 

Name: Michael Cuellar                                                                                 

Age: 55 yrs                                                                                       

Sex: Male                                                                                         

: 1965                                                                                   

MRN: [id number]                                                                                   

Arrival Date: 2021                                                                          

Time: 11:58                                                                                       

Account#: [id number]                                                                            

Bed 5                                                                                             

Private MD:                                                                                       

ED Physician Willis Harper                                                                         

HPI:                                                                                              

                                                                                             

12:32 This 55 yrs old  Male presents to ER via Ambulatory with complaints of Numbness.rn  

12:32 The patient's problem is reported as paresthesias, in left upper extremity, in left     rn  

      lower extremity. Onset: The symptoms/episode began/occurred yesterday. The symptoms are     

      alleviated by nothing. The symptoms are aggravated by nothing. Associated signs and         

      symptoms: Pertinent negatives: abdominal pain, seizure, shortness of breath, vomiting,      

      weakness. Severity of symptoms: At their worst the symptoms were mild in the emergency      

      department the symptoms have improved. The patient has not experienced similar symptoms     

      in the past. The patient has been recently seen by a physician:. Reports had subdural       

      hemorrhage, transferred to [place], had craniotomy performed, yesterday began with          

      numbness to left leg, seemed to travel up to left side of torso and arm, intermittent,      

      lasts about 5 min, not currently numb or weak, no headache. NO new meds. .                  

                                                                                                  

Historical:                                                                                       

- Allergies:                                                                                      

12:05 No Known Allergies;                                                                     ll1 

- Home Meds:                                                                                      

12:54 lisinopril 20 mg Oral tab 1 tab once daily [Active];                                    ld1 

- PMHx:                                                                                           

12:05 Hypertension; Brain bleed-craniotomy;                                                   ll1 

                                                                                                  

- Immunization history:: Client reports receiving the 2nd dose of the Covid vaccine,              

  Flu vaccine is up to date. Adult Immunizations up to date.                                      

- Social history:: Smoking status: Patient denies any tobacco usage or history of.                

- Family history:: not pertinent.                                                                 

- Hospitalizations: : Patient was recently seen at.                                               

                                                                                                  

                                                                                                  

ROS:                                                                                              

12:32 Constitutional: Negative for fever, chills, and weight loss, Eyes: Negative for injury, rn  

      pain, redness, and discharge, Neck: Negative for injury, pain, and swelling,                

      Cardiovascular: Negative for chest pain, palpitations, and edema, Respiratory: Negative     

      for shortness of breath, cough, wheezing, and pleuritic chest pain, Abdomen/GI:             

      Negative for abdominal pain, nausea, vomiting, diarrhea, and constipation, Back:            

      Negative for injury and pain, MS/Extremity: Negative for injury and deformity, Skin:        

      Negative for injury, rash, and discoloration, Neuro: Negative for headache, and seizure.    

                                                                                                  

Exam:                                                                                             

12:32 Constitutional:  This is a well developed, well nourished patient who is awake, alert,  rn  

      and in no acute distress. Joking. Head/Face:  Normocephalic, atraumatic. Eyes:  Pupils      

      equal round and reactive to light, extra-ocular motions intact Cardiovascular:  Regular     

      rate and rhythm.  No pulse deficits. Respiratory:  No increased work of breathing, no       

      retractions or nasal flaring. Abdomen/GI:  Soft, non-tender Skin:  Warm, dry with           

      normal turgor.  Normal color with no rashes, no lesions, and no evidence of cellulitis.     

      MS/ Extremity:  Pulses equal, no cyanosis.  Neurovascular intact.  Full, normal range       

      of motion.  Equal circumference. Neuro:  Awake and alert, GCS 15, oriented to person,       

      place, time, and situation.  Cranial nerves II-XII grossly intact.  Motor strength 5/5      

      in all extremities.  Sensory grossly intact.                                                

                                                                                                  

Vital Signs:                                                                                      

12:05  / 86; Pulse 60; Resp 17; Temp 97.2; Pulse Ox 100% ; Weight 112.49 kg; Height 5   ll1 

      ft. 8 in. (172.72 cm); Pain 0/10;                                                           

12:54  / 78; Pulse 61; Resp 14; Temp 98.8(O); Pulse Ox 100% on R/A; Weight 113.4 kg;    ld1 

      Height 5 ft. 8 in. (172.72 cm); Pain 0/10;                                                  

13:49  / 78; Pulse 86; Resp 16; Pulse Ox 100% ; Pain 0/10;                              ld1 

15:00  / 78; Pulse 59; Resp 18; Pulse Ox 100% on R/A; Pain 0/10;                        ld1 

15:45  / 80; Pulse 60; Resp 19; Pulse Ox 97% ;                                          hb  

12:54 Body Mass Index 38.01 (113.40 kg, 172.72 cm)                                            ld1 

                                                                                                  

MDM:                                                                                              

12:15 Patient medically screened.                                                             rn  

13:47 ED course: Calling [facility] neurosurgery to consult regarding persistent subdural,      rn  

      which could be residual and normal finding after craniotomy, but concerning given new       

      numbness and tingling left side of body and possibly new blood seen on todays ct scan. .    

14:23 Differential diagnosis: post craniotomy symptoms, post-concussive symptoms, residual    rn  

      blood, new blood. Data reviewed: vital signs, nurses notes, lab test result(s),             

      radiologic studies, CT scan, and as a result, I will admit patient. Counseling: I had a     

      detailed discussion with the patient and/or guardian regarding: the historical points,      

      exam findings, and any diagnostic results supporting the discharge/admit diagnosis, lab     

      results, radiology results, the need to transfer to another facility, for higher level      

      of care, [facility] does not immediately have the required                

      specialist. ED course: Accepted for transfer to [facility] neuro ICU for further neuro        

      evaluation given new symptoms and possibly new blood on CT, GCS 15, stable for              

      transfer. .                                                                                 

                                                                                                  

                                                                                             

12:30 Order name: CBC with Diff; Complete Time: 14:00                                         rn  

                                                                                             

12:30 Order name: Basic Metabolic Panel; Complete Time: 14:00                                 rn  

                                                                                             

12:30 Order name: Magnesium; Complete Time: 14:00                                             rn  

                                                                                             

12:30 Order name: Protime (+inr); Complete Time: 14:00                                        rn  

                                                                                             

12:30 Order name: Ptt, Activated; Complete Time: 14:00                                        rn  

                                                                                             

12:30 Order name: CT Head Brain wo Cont; Complete Time: 13:12                                 rn  

                                                                                             

12:30 Order name: IV Start; Complete Time: 13:13                                              rn  

                                                                                             

15:03 Order name: SARS-COV-2 RT PCR                                                           EDMS

                                                                                                  

Administered Medications:                                                                         

No medications were administered                                                                  

                                                                                                  

                                                                                                  

Disposition:                                                                                      

21 14:25 Transfer ordered to [facility]. Diagnosis are           

  Subdural hemorrhage, Paresthesia of skin.                                                       

- Reason for transfer: Higher level of care.                                                      

- Accepting physician is DrJaret .                                                                    

- Condition is Stable.                                                                            

- Problem is an ongoing problem.                                                                  

- Symptoms have improved.                                                                         

                                                                                                  

                                                                                                  

                                                                                                  

Signatures:                                                                                       

Dispatcher [employer]                           EDMS                                                 

Willis Harper MD MD rn Lewis, Lynsay, RN                       RN   ll1                                                  

Nida Milian RN                     RN   ld1                                                  

                                                                                                  

Corrections: (The following items were deleted from the chart)                                    

14:13 13:45 CORONAVIRUS+MR.LAB.BRZ ordered. EDMS                                              EDMS

16:06 14:25 2021 14:25 Transfer ordered to [facility].       ld1 

      Diagnosis is Subdural hemorrhage; Paresthesia of skin. Reason for transfer: Higher          

      level of care. Accepting physician is  . Condition is Stable. Problem is an ongoing      

      problem. Symptoms have improved. rn                                                         

                                                                                                  

**************************************************************************************************

## 2021-08-11 ENCOUNTER — HOSPITAL ENCOUNTER (EMERGENCY)
Dept: HOSPITAL 97 - ER | Age: 56
Discharge: HOME | End: 2021-08-11
Payer: COMMERCIAL

## 2021-08-11 VITALS — DIASTOLIC BLOOD PRESSURE: 95 MMHG | SYSTOLIC BLOOD PRESSURE: 164 MMHG | OXYGEN SATURATION: 100 % | TEMPERATURE: 98.1 F

## 2021-08-11 DIAGNOSIS — I10: ICD-10-CM

## 2021-08-11 DIAGNOSIS — R20.2: Primary | ICD-10-CM

## 2021-08-11 PROCEDURE — 70450 CT HEAD/BRAIN W/O DYE: CPT

## 2021-08-11 PROCEDURE — 71046 X-RAY EXAM CHEST 2 VIEWS: CPT

## 2021-08-11 PROCEDURE — 99283 EMERGENCY DEPT VISIT LOW MDM: CPT

## 2021-08-11 NOTE — XMS REPORT
Continuity of Care Document

                           Created on:2021



Patient:KASANDRA CUELLAR

Sex:Male

:1965

External Reference #:[id number]





Demographics







                          Address                   [address] [place] [address]

 

                          Home Phone                [phone]

 

                          Mobile Phone              [phone]

 

                          Email Address             DECLINED

 

                          Preferred Language        spa

 

                          Marital Status            Unknown

 

                          Pentecostal Affiliation     Unknown

 

                          Race                      Unknown

 

                          Additional Race(s)        Unavailable



                                                    White

 

                          Ethnic Group              Unknown









Author







                          Organization              [facility]

t

 

                          Address                   [address]



                                                    [place] [address]

 

                          Phone                     [phone]









Support







                Name            Relationship    Address         Phone

 

                Polo           Spouse          [address] [phone]



                                                [place] [address] 

 

                Juan        Daughter        Unavailable     [phone]









Care Team Providers







                    Name                Role                Phone

 

                    Pcp MD              Primary Care Physician Unavailable

 

                    Christine CROWLEY      Attending Clinician [phone]

 

                    Kiran Kwon NP Attending Clinician [phone]

 

                    [address] Room  Attending Clinician Unavailable

 

                    Kiran Soto Attending Clinician [phone]

 

                    BILL Corona MD   Attending Clinician [phone]

 

                    Brayan Caldera MD  Attending Clinician [phone]

 

                    BILL CORONA      Attending Clinician Unavailable

 

                    Christiano CROWLEY           Attending Clinician [phone]

 

                    Eli CROWLEY,  In         Attending Clinician [phone]

 

                    Christine CROWLEY      Attending Clinician [phone]

 

                    Maxime Clark MD   Attending Clinician [phone]

 

                    Cortes CRNA           Attending Clinician [phone]

 

                    CHRISTIANO              Attending Clinician Unavailable

 

                    Darryl CROWLEY         Attending Clinician [phone]

 

                    Doctor Unassigned,  Name Attending Clinician Unavailable

 

                    MAHESH Feldman MD      Attending Clinician [phone]

 

                    BILL CORONA      Admitting Clinician Unavailable

 

                    CHRISTIANO              Admitting Clinician Unavailable









Payers







           Payer Name Policy Type Policy     Effective Date Expiration Date Sour

ce



                                 Number                           

 

           AMBETTERAMBETTER            [id number] 2021            [id number]                       00[facility]00[facility]00              - 

Medical



           /-Present                                             Center







Problems







       Condition Condition Condition Status Onset  Resolution Last   Treating Co

mments 

Source



       Name   Details Category        Date   Date   Treatment Clinician        



                                                 Date                 

 

       Weakness Weakness Disease Active                              [facility]

[facility]



                                   [facility]



                                   00:[facility]:                             Medical



                                   00                                 [place]

 

       Midline Midline Disease Active                              [facility]



       shift [facility] shift of                                              [facility] 

-



       brain  brain                00:00:                             Medical



                                   00                                 [place]

 

       HTN    HTN    Disease Active                              [facility]



       (hypertens (hypertens                                              

kes -



       ion)   ion)                 00:00:                             Medical



                                   00                                 [place]

 

       Subdural Subdural Disease Active                       Overview: [facility]

I St



       hematoma hematoma               3-29                        Added  [facility] 

-



                                   00:00:                      automatic Medical



                                   00                          ally from Center



                                                               request 



                                                               for    



                                                               surgery 



                                                               [id number] 







Allergies, Adverse Reactions, Alerts

This patient has no known allergies or adverse reactions.



Social History







           Social Habit Start Date Stop Date  Quantity   Comments   Source

 

           Sex Assigned At                                             [facility]

 

           Tobacco use and 2021 Never used            [facility] -



           exposure   00:00:00   00:00:00                         [facility]

 

           Alcohol intake 2021 Ex-drinker            [facility]

es -



                      00:00:00   00:00:00   (finding)             [facility]

 

           History [facility] 2021 3                     [facility]

 -



           Alcohol Frequency 00:00:00   00:00:00                         [facility]

 

           History [facility] 2021 1                     [facility]

 -



           Alcohol Std Drinks 00:00:00   00:00:00                         Medica

l Center

 

           History [facility] 2021 1                     [facility]

 -



           Alcohol Binge 00:00:00   00:00:00                         [facility]

ter









                Smoking Status  Start Date      Stop Date       Source

 

                Never smoker                                    [facility]

edical [place]







Medications







       Ordered Filled Start  Stop   Current Ordering Indication Dosage Frequency

 Signature

                    Comments            Components          Source



     Medication Medication Date Date Medication? Clinician                (SIG) 

          



     Name Name                                                   

 

     HYDROcodone      2021- No             1{tbl}      Take 1           C

[facility]



     -acetaminop      -                          tablet by           Julieta daniels (NORCO      00:00: 23:59                          mouth           Medic

al



     5-325)      00   :00                           every 8           Center



     5-325 mg                                         (eight)           



     per tablet                                         hours as           



                                                  needed for           



                                                  Pain for           



                                                  up to 6           



                                                  days. Max           



                                                  Daily           



                                                  Amount: 3           



                                                  tablets           

 

     ibuprofen      2021- No        headache 800mg      Take 800         

  [facility]



     (ADVIL,MOTR      3-22 04-03           disorder           mg by           Julieta

kes -



     IN) 800 MG      00:00: 00:00                          mouth           Medic

al



     tablet      00   :00                           every 6           Center



                                                  (six)           



                                                  hours as           



                                                  needed for           



                                                  Pain For           



                                                  headaches,           



                                                  took for           



                                                  two days           



                                                  every 6           



                                                  hours           



                                                  only. Per           



                                                  NP Haley Neri .           

 

     lisinopriL      2020      Yes            20mg QD   Take 20 mg           C

[facility]



     (PRINIVIL,Z      1-09                               by mouth           David

s -



     ESTRIL) 20      00:00:                               daily.           Medic

al



     MG tablet      00                                                Center







Vital Signs







             Vital Name   Observation Time Observation Value Comments     Source

 

             Systolic blood 2021 12:00:00 123 mm[Hg]                [facility]

 

             Diastolic blood 2021 12:00:00 62 mm[Hg]                 [facility]

 

             Heart rate   2021 12:00:00 75 /min                   [facility]

 

             Body temperature 2021 12:00:00 35.89 Judith                 [facility]

 

             Respiratory rate 2021 12:00:00 20 /min                   [facility]

 

             Oxygen saturation in 2021 12:00:00 99 /min                   

[facility]



             Arterial blood by                                        Medical Ce

nter



             Pulse oximetry                                        

 

             Body height  2021 18:00:00 172.7 cm                  [facility]

 

             Body weight  2021 18:00:00 105.688 kg                [facility]

 

             BMI          2021 18:00:00 35.43 kg/m2               [facility]







Procedures







                Procedure       Date / Time Performed Performing Clinician [facility]

e

 

                CT BRAIN WITHOUT IV 2021 07:13:00 Raj Kwon

[facility]

 

                MAGNESIUM       2021 06:48:00 Gary Yee [facility]

 

                PHOSPHORUS      2021 06:48:00 Gary Yee [facility]

 

                BASIC METABOLIC PANEL 2021 06:48:00 Gary Yee [facility]

I [address]

 

                POCT-GLUCOSE METER 2021 06:24:00 Devon Corona [facility]

I [facility]

 

                CBC W/PLT COUNT & AUTO 2021 05:38:00 Gary Yee

[facility]

 

                POCT-GLUCOSE METER 2021 00:06:00 Devon Corona CH

I [facility]

 

                EEG AWAKE AND DROWSY 2021 15:20:00 Gary Yee [facility]

 

                POCT-GLUCOSE METER 2021 12:22:00 Devon Corona CH

I [facility]

 

                CBC W/PLT COUNT & AUTO 2021 05:01:00 Dayne Monsivais [facility]

 

                MAGNESIUM       2021 05:01:00 Gary Yee [facility]

 

                PHOSPHORUS      2021 05:01:00 Gary Yee [facility]

 

                BASIC METABOLIC PANEL 2021 05:01:00 Gary Yee CH

[address]

 

                CTA CAROTID     2021 04:20:00 Gary Yee [facility]

 

                CTA BRAIN       2021 04:20:00 Gary Yee [facility]

 

                MR BRAIN WITHOUT IV 2021 22:32:00 Gary Yee [facility]

 

                CBC W/PLT COUNT & AUTO 2021 18:15:00 Dayne Monsivais [facility]

 

                HEPATIC FUNCTION PANEL 2021 18:15:00 Gary Yee

[facility]

 

                PROTHROMBIN TIME/INR 2021 18:15:00 Gary Yee [facility]

 

                APTT            2021 18:15:00 Gary Yee [facility]

 

                BASIC METABOLIC PANEL 2021 18:15:00 Loi [address]

 

                FIBRINOGEN      2021 18:15:00 Loi [facility]

 

                [facility]

 

                PHOSPHORUS      2021 18:15:00 Loi [facility]

 

                CT BRAIN WITHOUT IV 2021 10:11:00 Abner Benitez In    [facility]

 

                BASIC METABOLIC PANEL 2021 05:07:00 Gary Yee [facility]

[address]

 

                MAGNESIUM       2021 05:07:00 Gary Yee [facility]

 

                PHOSPHORUS      2021 05:07:00 Gary Yee [facility]

 

                CBC W/PLT COUNT & AUTO 2021 05:06:00 Gary Yee

[facility]

 

                CBC W/PLT COUNT & AUTO 2021 03:52:00 Darrin Francis

[facility]

 

                BASIC METABOLIC PANEL 2021 03:52:00 Gary Yee CH

[address]

 

                MAGNESIUM       2021 03:52:00 Gary Yee [facility]

 

                PHOSPHORUS      2021 03:52:00 Gary Yee [facility]

 

                CBC W/PLT COUNT & AUTO 2021 04:12:00 Darrin Francis

[facility]

 

                BASIC METABOLIC PANEL 2021 03:33:00 Gary Yee CH

[address]

 

                MAGNESIUM       2021 03:33:00 Gary Yee [facility]

 

                PHOSPHORUS      2021 03:33:00 Gary Yee [facility]

 

                CT BRAIN WITHOUT IV 2021 04:12:00 Darrin Francis [facility]

 

                CBC W/PLT COUNT & AUTO 2021 03:48:00 Darrin Francis

[facility]

 

                BASIC METABOLIC PANEL 2021 03:48:00 Gary Yee CH

I [facility]



                ()                                             [facility]

 

                MAGNESIUM       2021 03:48:00 Gary Yee [facility]

 

                PHOSPHORUS      2021 03:48:00 Gary Yee [facility]

 

                CRANIECTOMY/    2021 07:33:00 Jose A [facility] -



                CRANIOTOMY,EVACUATION                                 Medical Ce

nter



                HEMATOMA                                        

 

                CT BRAIN WITHOUT IV 2021 04:28:00 Kvng Cotton [facility]

 

                CBC W/PLT COUNT & AUTO 2021 03:47:00 Maria Luisa Schreiber CH

I [facility]

 

                BASIC METABOLIC PANEL 2021 03:47:00 Gary Yee CH

I [address]

 

                MAGNESIUM       2021 03:47:00 Gary Yee [facility]

 

                PHOSPHORUS      2021 03:47:00 Gary Yee [facility]

 

                LIPID PANEL     2021 03:47:00 Abdoul [facility]

 

                TSH/FREE T4 IF INDICATED 2021 03:47:00 [facility]

 

                TROPONIN I      2021 03:47:00 [facility]

 

                ABORH, MANUAL   2021 21:51:00 Reyes, Meredith Anne [facility]

 

                SARS-COV2/RT-PCR ([facility] & 2021 21:17:00 Abdoul [facility] -



                REF LABS)                                       Medical Center

 

                TYPE AND SCREEN, 2021 21:16:00 Maria Luisa Schreiber [facility]

es -



                AUTOMATED                                       [facility]

 

                CBC W/PLT COUNT & AUTO 2021 21:16:00 Maria Luisa Schreiber 

I [facility]



                DIFFERENTIAL                                    [facility]

 

                BASIC METABOLIC PANEL 2021 21:16:00 Maria Luisa Schreiber [facility] -



                (7)                                             Medical Center

 

                MAGNESIUM       2021 21:16:00 Maria Luisa Schreiber [facility]

 

                PHOSPHORUS      20[facility]16[facility]

 

                PROTHROMBIN TIME/INR 2021 21:16:00 Abdoul [facility]

 

                APTT            2021 21:16:00 [facility]

 

                FIBRINOGEN      2021 21:16:00 Abdoul [facility]

 

                ECG 12-LEAD     2021 21:15:02 Abdoul [facility]

 

                XR CHEST 1 VIEW PORTABLE 2021 20:38:00 Maria Luisa Schreiber 

[facility] -



                / BEDSIDE                                       Medical Center







Plan of Care







             Planned Activity Planned Date Details      Comments     Source

 

             Future Scheduled 2024   Lipid panel               [facility]

s -



             Test         00:00:00     (procedure) [code =              [facility]



                                       91556574]                 

 

             Future Scheduled 2021   INFLUENZA VACCINE              [facility]



             Test         00:00:00     (#1) [code =              [facility]



                                       INFLUENZA VACCINE              



                                       (#1)]                     

 

             Future Scheduled 2016-10-11   SHINGLES VACCINES (1              [facility] -



             Test         00:00:00     of 2) [code =              [facility]



                                       SHINGLES VACCINES (1              



                                       of 2)]                    

 

             Future Scheduled 1985-10-11   DTAP/TDAP/TD              [facility]

s -



             Test         00:00:00     VACCINES (1 - Tdap)              [facility]



                                       [code = DTAP/TDAP/TD              



                                       VACCINES (1 - Tdap)]              

 

             Future Scheduled 1984-10-11   HEPATITIS C               [facility]

s -



             Test         00:00:00     SCREENING [code =              Medical 

nter



                                       HEPATITIS C               



                                       SCREENING]                

 

             Future Scheduled 1965   Screening for              [facility]

es -



             Test         00:00:00     malignant neoplasm              Medical C

enter



                                       of colon (procedure)              



                                       [code = 777322969]              







Encounters







        Start   End     Encounter Admission Attending Care    Care    Encounter 

Source



        Date/Time Date/Time Type    Type    Clinicians Facility Department ID   

   

 

        2021 Office          Christine WADE     [id number]

[id number] 



        08:02:38 13:12:44 Visit           , Jose A AMBULATOR [id number]        

 



                                                Y       [id number]         



                                                        [phone]         



                                                        300             

 

        2021-04-15 2021-04-15 Office          [facility]     [id number]

[id number] 



        12:08:37 14:38:23 Visit           Raj AMBULATOR [id number]     

    



                                        Kiran   Y       [id number]         



                                                        [phone]         



                                                        300             

 

        2020-11-10 2020-11-10 [facility]         [facility] [id number]

[id number] 



        14:33:00 23:59:00 Encounter         Ricardo SOLER       [id number]         



                                                [facility] [id number]         



                                                        [phone]         



                                                        031             

 

        2020-11-10 2020-11-10 Orders          [facility]    [id number]

[id number] 



        00:00:00 00:00:00 Only            Unassigned, BELEM   350.1.13.10       

  



                                        Rainelle [facility] [id number]         



                                                        [phone]         



                                                        009             

 

        2020 Office          [facility]    [id number]

[id number] 



        11:17:09 12:03:56 Visit           [facility]  350.1.13.10        

 



                                                [place] [id number]         



                                                [place] [phone]         



                                                nal     044             



                                                Office                  



                                                Building                 



                                                One                     







Results







           Test       Test       Test       Results    Result     Source



           Description Time       Comments              Comments   

 

           CT, BRAIN, 2021-   Unlisted   ******************************      

      



           WITHOUT IV 23         Reason for ******************************      

      



           CONTRAST   12:50:00   Exam -     [facility]            



                                 Click Yes  CENTERName: KASANDRA CUELLAR       

     



                                 and Enter        : 1965            



                                 Reason     Sex:                  



                                 Below->YesU M*****************************     

       



                                 nlisted    ******************************      

      



                                 Reason for *FINAL REPORT PATIENT ID:           

 



                                 Exam->[id number] EXAM: CT HEAD WITHOUT     

       



                                 X9A        CONTRAST History: 55-year-old       

     



                                            male presenting for follow-up       

     



                                            of intracranial hemorrhage.         

   



                                            Status post left parietal           

 



                                            craniotomy.Comparison studies:      

      



                                            Multiple studies were used          

  



                                            comparison, the most recent         

   



                                            brain CT from 2021.            



                                            Technique: Axial images were        

    



                                            obtained from the skull base        

    



                                            to the vertex.  Coronal and         

   



                                            sagittal reconstructions            



                                            obtained from the axial            



                                            data.Dose modulation,            



                                            iterative reconstruction,           

 



                                            and/or weight based adjustment      

      



                                            of the mA/kV was utilized to        

    



                                            reduce the radiation dose to        

    



                                            as low as reasonably            



                                            achievable. Findings:            



                                            Skull/extra-axial spaces:           

 



                                            Postoperative findings from         

   



                                            left parietal craniotomy, with      

      



                                            interval decrease in of left        

    



                                            subdural hematoma and improved      

      



                                            postoperative pneumocephalus.       

     



                                            The residual mixed density          

  



                                            left frontoparietal subdural        

    



                                            hematoma measures 0.6 cm in         

   



                                            maximum transverse dimension,       

     



                                            and mildly effaces the left         

   



                                            lateral frontal sulci. There        

    



                                            has been interval resolution        

    



                                            of right extra-axial hematoma       

     



                                            and rightward midline shift.        

    



                                            No blastic or lytic lesions.        

    



                                            Brain sulci: Appropriate for        

    



                                            age.Ventricles: Normal in size      

      



                                            and configuration. No            



                                            hydrocephalus. Parenchyma: No       

     



                                            abnormal densities. No masses       

     



                                            or acute/chronic cortical           

 



                                            vascular insults.            



                                            Sellar/suprasellar region: No       

     



                                            abnormalitiesCraniocervical         

   



                                            junction: Patent foramen            



                                            magnum.  No Chiari one            



                                            malformation.  IMPRESSION:          

  



                                            1.Interval decrease in size of      

      



                                            left residual subdural            



                                            hematoma compared to head CT        

    



                                            of 2021, complete            



                                            resolution of previously seen       

     



                                            right-sided midline shift.          

  



                                            2.Improved postoperative            



                                            changes from left craniotomy        

    



                                            and pneumocephalus. 3.Interval      

      



                                            resolution of the small right       

     



                                            subdural hematoma. Signed:          

  



                                            Wendy Samaniego MDReport Verified      

      



                                            Date/Time:  2021            



                                            12:50:55              



                                            Electronically signed by:           

 



                                            WENDY SAMANIEGO on 2021         

   



                                            12:50 PM              

 

           CT Brain   2021-              Interface, External Ris In -        

    [facility]



           without IV 23                    2021 12:53 PM CDTFINAL        

    [facility]



           Contrast   12:50:00              REPORT PATIENT ID:   [id number]       

     Medical



                                            EXAM: CT HEAD WITHOUT CONTRAST      

      Center



                                            History: 55-year-old male           

 



                                            presenting for follow-up of         

   



                                            intracranial hemorrhage.            



                                            Status post left parietal           

 



                                            craniotomy.Comparison studies:      

      



                                            Multiple studies were used          

  



                                            comparison, the most recent         

   



                                            brain CT from 2021.            



                                            Technique: Axial images were        

    



                                            obtained from the skull base        

    



                                            to the vertex.  Coronal and         

   



                                            sagittal reconstructions            



                                            obtained from the axial            



                                            data.Dose modulation,            



                                            iterative reconstruction,           

 



                                            and/or weight based adjustment      

      



                                            of the mA/kV was utilized to        

    



                                            reduce the radiation dose to        

    



                                            as low as reasonably            



                                            achievable. Findings:            



                                            Skull/extra-axial spaces:           

 



                                            Postoperative findings from         

   



                                            left parietal craniotomy, with      

      



                                            interval decrease in of left        

    



                                            subdural hematoma and improved      

      



                                            postoperative pneumocephalus.       

     



                                            The residual mixed density          

  



                                            left frontoparietal subdural        

    



                                            hematoma measures 0.6 cm in         

   



                                            maximum transverse dimension,       

     



                                            and mildly effaces the left         

   



                                            lateral frontal sulci. There        

    



                                            has been interval resolution        

    



                                            of right extra-axial hematoma       

     



                                            and rightward midline shift.        

    



                                            No blastic or lytic lesions.        

    



                                            Brain sulci: Appropriate for        

    



                                            age.Ventricles: Normal in size      

      



                                            and configuration. No            



                                            hydrocephalus. Parenchyma: No       

     



                                            abnormal densities. No masses       

     



                                            or acute/chronic cortical           

 



                                            vascular insults.            



                                            Sellar/suprasellar region: No       

     



                                            abnormalitiesCraniocervical         

   



                                            junction: Patent foramen            



                                            magnum.  No Chiari one            



                                            malformation.  IMPRESSION:          

  



                                            1.Interval decrease in size of      

      



                                            left residual subdural            



                                            hematoma compared to head CT        

    



                                            of 2021, complete            



                                            resolution of previously seen       

     



                                            right-sided midline shift.          

  



                                            2.Improved postoperative            



                                            changes from left craniotomy        

    



                                            and pneumocephalus. 3.Interval      

      



                                            resolution of the small right       

     



                                            subdural hematoma. Signed:          

  



                                            Wendy Samaniego MDReport Verified      

      



                                            Date/Time:  2021            



                                            12:50:55              



                                            Electronically signed by:           

 



                                            WENDY SAMANIEGO on 2021         

   



                                            12:50 PM              









                    Basic Metabolic Panel 2021 07:20:00 









                      Test Item  Value      Reference Range Interpretation Comme

nts









             Sodium (test code = 135 meq/L    136-145      L            



             2951-2)                                             

 

             Potassium (test code = 4.0 meq/L    3.5-5.1                   



             2823-3)                                             

 

             Chloride (test code = 102 meq/L                        



             5-0)                                             

 

             CO2 (test code = -9) 24 meq/L     22-29                     

 

             BUN (test code = 3094-0) 15 mg/dL     7-21                      

 

             Creatinine (test code = 0.94 mg/dL   0.57-1.25                 



             2160-0)                                             

 

             Glucose (test code = 89 mg/dL                         



             2345-7)                                             

 

             Calcium (test code = 9.1 mg/dL    8.4-10.2                  



             54156-5)                                            

 

             EGFR (test code = 84           mL/min/1.73 sq m              ESTIMMAHESH VÁSQUEZ GFR IS



             33914-3)                                            NOT AS ACCURATE

 AS



                                                                 CREATININE



                                                                 CLEARANCE IN



                                                                 PREDICTING



                                                                 GLOMERULAR



                                                                 FILTRATION RATE

.



                                                                 ESTIMATED GFR I

S



                                                                 NOT APPLICABLE 

FOR



                                                                 DIALYSIS PATIEN

TS.

 

             SCOOTER (test code = SCOOTER)  ID - [place]                       

    



                          FOnce on admission and                           



                          Daily AM afterwardsOnce                           



                          on admission and Daily                           



                          AM afterwards                           

 

             Lab Interpretation (test Abnormal                               



             code = 35203-6)                                        



[facility]





             Test Item    Value        Reference Range Interpretation Comments

 

             Magnesium (test code = 2.0 mg/dL    1.6-2.6                   



             25155-2)                                            

 

             SCOOTER (test code = SCOOTER)  ID - [place]                       

    



                          FOnce on admission and                           



                          Daily AM                               



                          afterwardsOnce on                           



                          admission and Daily AM                           



                          afterwards                             

 

             Lab Interpretation Normal                                 



             (test code = 57029-7)                                        



[facility]





             Test Item    Value        Reference Range Interpretation Comments

 

             Phosphorus (test code = 3.7 mg/dL    2.3-4.7                   



             2777-1)                                             

 

             SCOOTER (test code = SCOOTER)  ID - [place]                       

    



                          FOnce on admission and                           



                          Daily AM                               



                          afterwardsOnce on                           



                          admission and Daily AM                           



                          afterwards                             

 

             Lab Interpretation Normal                                 



             (test code = 45506-7)                                        



[facility] [id number] 07:20:00





             Test Item    Value        Reference Range Interpretation Comments

 

             SODIUM (BEAKER) 135 meq/L    136-145      L            



             (test code = 381)                                        

 

             POTASSIUM (BEAKER) 4.0 meq/L    3.5-5.1                   



             (test code = 379)                                        

 

             CHLORIDE (BEAKER) 102 meq/L                        



             (test code = 382)                                        

 

             CO2 (BEAKER) (test 24 meq/L     22-29                     



             code = 355)                                         

 

             BLOOD UREA NITROGEN 15 mg/dL     7-21                      



             (BEAKER) (test code                                        



             = 354)                                              

 

             CREATININE (BEAKER) 0.94 mg/dL   0.57-1.25                 



             (test code = 358)                                        

 

             GLUCOSE RANDOM 89 mg/dL                         



             (BEAKER) (test code                                        



             = 652)                                              

 

             CALCIUM (BEAKER) 9.1 mg/dL    8.4-10.2                  



             (test code = 697)                                        

 

             EGFR (BEAKER) (test 84 mL/min/1.73                           ESTIMA

ALESSIA GFR IS



             code = 1092) sq m                                   NOT AS ACCURATE

 AS



                                                                 CREATININE



                                                                 CLEARANCE IN



                                                                 PREDICTING



                                                                 GLOMERULAR



                                                                 FILTRATION RATE

.



                                                                 ESTIMATED GFR I

S



                                                                 NOT APPLICABLE 

FOR



                                                                 DIALYSIS PATIEN

TS.



 ID - JOHN FOnce on admission and Daily AM afterwardsOnce on 
admission and Daily AM [id number] 07:20:00





             Test Item    Value        Reference Range Interpretation Comments

 

             MAGNESIUM (BEAKER) (test code = 2.0 mg/dL    1.6-2.6               

    



             627)                                                



 ID - JOHN Hurtadoce on admission and Daily AM afterwardsOnce on 
admission and Daily AM [id number] 07:20:00





             Test Item    Value        Reference Range Interpretation Comments

 

             PHOSPHORUS (BEAKER) (test code = 3.7 mg/dL    2.3-4.7              

     



             604)                                                



 ID - JOHN Piedra on admission and Daily AM afterwardsOnce on 
admission and Daily AM afterwardsPOC-Glucose [id number] 06:37:00





             Test Item    Value        Reference Range Interpretation Comments

 

             POC-Glucose Meter (test 88 mg/dL                         : TE

STED AT [facility]



             code = 1538)                                        [address] [facility], [address]

30:



                                                                 /Techni

jenise



                                                                 ID = [id number] for



                                                                 MONALISA KEN

 

             Lab Interpretation (test Normal                                 



             code = 88187-2)                                        



[facility] [id number] 06:37:00





             Test Item    Value        Reference Range Interpretation Comments

 

             POC-GLUCOSE METER 88 mg/dL                         : TESTED A

T [facility] [address]



             (BEAKER) (test code =                                        [facility],



             [address])                                               [address]:



                                                                 /Techni

jenise ID =



                                                                 [id number] for BRAYDEN ZIMMERMAN



CBC with platelet count + automated [id number] 06:01:00





             Test Item    Value        Reference Range Interpretation Comments

 

             WBC (test code = 6690-2) 7.0          See_Comment                [A

utomated message]



                                                                 The system [employer]



                                                                 generated this 

result



                                                                 transmitted ref

erence



                                                                 range: 3.5 - 10

.5



                                                                 K/L. The refe

rence



                                                                 range was not u

sed to



                                                                 interpret this 

result



                                                                 as normal/abnor

mal.

 

             RBC (test code = 789-8) 4.27         See_Comment  L             [Au

tomated message]



                                                                 The system [employer]



                                                                 generated this 

result



                                                                 transmitted ref

erence



                                                                 range: 4.63 - 6

.08



                                                                 M/L. The refe

rence



                                                                 range was not u

sed to



                                                                 interpret this 

result



                                                                 as normal/abnor

mal.

 

             MCHC (test code = 786-4) 35.1         See_Comment  L             [A

utomated message]



                                                                 The system [employer]



                                                                 generated this 

result



                                                                 transmitted ref

erence



                                                                 range: 32.3 - 3

6.5



                                                                 GM/DL. The refe

rence



                                                                 range was not u

sed to



                                                                 interpret this 

result



                                                                 as normal/abnor

mal.

 

             Hematocrit (test code = 38.7 %       40.1-51      L            



             4544-3)                                             

 

             MCV (test code = 787-2) 90.6 fL      79-92.2                   

 

             MCH (test code = 785-6) 31.9 pg      25.7-32.2                 

 

             RDW (test code = 788-0) 12.7 %       11.6-14.4                 

 

             Platelets (test code = 207          See_Comment                [Aut

omated message]



             777-3)                                              The system [employer]



                                                                 generated this 

result



                                                                 transmitted ref

erence



                                                                 range: 150 - 45

0 K/CU



                                                                 MM. The referen

ce



                                                                 range was not u

sed to



                                                                 interpret this 

result



                                                                 as normal/abnor

mal.

 

             MPV (test code = 11.4 fL      9.4-12.4                  



             72708-8)                                            

 

             nRBC (test code = 413) 0            See_Comment                [Aut

omated message]



                                                                 The system [employer]



                                                                 generated this 

result



                                                                 transmitted ref

erence



                                                                 range: 0 - 0 /1

00



                                                                 WBC. The refere

nce



                                                                 range was not u

sed to



                                                                 interpret this 

result



                                                                 as normal/abnor

mal.

 

             % Neutros (test code = 54 %                                   



             429)                                                

 

             % Lymphs (test code = 35 %                                   



             430)                                                

 

             % Monos (test code = 8 %                                    



             431)                                                

 

             % Eos (test code = 432) 3 %                                    

 

             % Baso (test code = 437) 1 %                                    

 

             # Neutros (test code = 3.76         See_Comment                [Aut

omated message]



             670)                                                The system [employer]



                                                                 generated this 

result



                                                                 transmitted ref

erence



                                                                 range: 1.78 - 5

.38



                                                                 K/L. The refe

rence



                                                                 range was not u

sed to



                                                                 interpret this 

result



                                                                 as normal/abnor

mal.

 

             # Lymphs (test code = 2.40         See_Comment                [Auto

mated message]



             414)                                                The [employer]



                                                                 generated this 

result



                                                                 transmitted ref

erence



                                                                 range: 1.32 - 3

.57



                                                                 K/L. The refe

rence



                                                                 range was not u

sed to



                                                                 interpret this 

result



                                                                 as normal/abnor

mal.

 

             # Monos (test code = 0.53         See_Comment                [Autom

ated message]



             415)                                                The system [employer]



                                                                 generated this 

result



                                                                 transmitted ref

erence



                                                                 range: 0.30 - 0

.82



                                                                 K/L. The refe

rence



                                                                 range was not u

sed to



                                                                 interpret this 

result



                                                                 as normal/abnor

mal.

 

             # Eos (test code = 416) 0.19         See_Comment                [Au

tomated message]



                                                                 The system [employer]



                                                                 generated this 

result



                                                                 transmitted ref

erence



                                                                 range: 0.04 - 0

.54



                                                                 K/L. The refe

rence



                                                                 range was not u

sed to



                                                                 interpret this 

result



                                                                 as normal/abnor

mal.

 

             # Baso (test code = 417) 0.05         See_Comment                [A

utomated message]



                                                                 The system [employer]



                                                                 generated this 

result



                                                                 transmitted ref

erence



                                                                 range: 0.01 - 0

.08



                                                                 K/L. The refe

rence



                                                                 range was not u

sed to



                                                                 interpret this 

result



                                                                 as normal/abnor

mal.

 

             Immature     0 %          0-1                       



             Granulocytes-Relative                                        



             (test code = 2801)                                        

 

             Lab Interpretation (test Abnormal                               



             code = 30604-7)                                        



[facility] W/PLT COUNT &amp; AUTO [id number] 
06:01:00





             Test Item    Value        Reference Range Interpretation Comments

 

             WHITE BLOOD CELL COUNT (BEAKER) 7.0 K/ L     3.5-10.5              

    



             (test code = 775)                                        

 

             RED BLOOD CELL COUNT (BEAKER) 4.27 M/ L    4.63-6.08    L          

  



             (test code = 761)                                        

 

             HEMOGLOBIN (BEAKER) (test code = 13.6 GM/DL   13.7-17.5    L       

     



             410)                                                

 

             HEMATOCRIT (BEAKER) (test code = 38.7 %       40.1-51.0    L       

     



             411)                                                

 

             MEAN CORPUSCULAR VOLUME (BEAKER) 90.6 fL      79.0-92.2            

     



             (test code = 753)                                        

 

             MEAN CORPUSCULAR HEMOGLOBIN 31.9 pg      25.7-32.2                 



             (BEAKER) (test code = 751)                                        

 

             MEAN CORPUSCULAR HEMOGLOBIN CONC 35.1 GM/DL   32.3-36.5            

     



             (BEAKER) (test code = 752)                                        

 

             RED CELL DISTRIBUTION WIDTH 12.7 %       11.6-14.4                 



             (BEAKER) (test code = 412)                                        

 

             PLATELET COUNT (BEAKER) (test 207 K/CU MM  150-450                 

  



             code = 756)                                         

 

             MEAN PLATELET VOLUME (BEAKER) 11.4 fL      9.4-12.4                

  



             (test code = 754)                                        

 

             NUCLEATED RED BLOOD CELLS 0 /100 WBC   0-0                       



             (BEAKER) (test code = 413)                                        

 

             NEUTROPHILS RELATIVE PERCENT 54 %                                  

 



             (BEAKER) (test code = 429)                                        

 

             LYMPHOCYTES RELATIVE PERCENT 35 %                                  

 



             (BEAKER) (test code = 430)                                        

 

             MONOCYTES RELATIVE PERCENT 8 %                                    



             (BEAKER) (test code = 431)                                        

 

             EOSINOPHILS RELATIVE PERCENT 3 %                                   

 



             (BEAKER) (test code = 432)                                        

 

             BASOPHILS RELATIVE PERCENT 1 %                                    



             (BEAKER) (test code = 437)                                        

 

             NEUTROPHILS ABSOLUTE COUNT 3.76 K/ L    1.78-5.38                 



             (BEAKER) (test code = 670)                                        

 

             LYMPHOCYTES ABSOLUTE COUNT 2.40 K/ L    1.32-3.57                 



             (BEAKER) (test code = 414)                                        

 

             MONOCYTES ABSOLUTE COUNT (BEAKER) 0.53 K/ L    0.30-0.82           

      



             (test code = 415)                                        

 

             EOSINOPHILS ABSOLUTE COUNT 0.19 K/ L    0.04-0.54                 



             (BEAKER) (test code = 416)                                        

 

             BASOPHILS ABSOLUTE COUNT (BEAKER) 0.05 K/ L    0.01-0.08           

      



             (test code = 417)                                        

 

             IMMATURE GRANULOCYTES-RELATIVE 0 %          0-1                    

   



             PERCENT (BEAKER) (test code =                                      

  



             2801)                                               



POCT-GLUCOSE [id number] 00:19:00





             Test Item    Value        Reference Range Interpretation Comments

 

             POC-GLUCOSE METER 114 mg/dL           H            : TESTED A

T [facility] [address]



             (BEAKER) (test code =                                        [place],



             1538)                                               [address]:



                                                                 /Techni

jenise ID



                                                                 = [id number] for RK

BRETTPATRICIA CARVAJALMAHESH



EEG AWAKE AND [id number] 17:48:00Reason for exam:-&gt;R/o seizures
************************************************************[facility]        : 1965        Sex: 
M************************************************************NEUROPHYSIOLOGY EEG
REPORT DATES OF TEST: 21    DATE OF REPORT: 21            Name: 
Kasandra Cuellar  MRN: [id number] ACC #:  [id number] EEG Number:  Start time: 
2:59 PM Stop time: 3:19 PM CPT Code: 95459 ICD-10: R56.9   HISTORY: 55-year-old 
man presented with right sided SDH and experiencing left sided numbness   
MEDICATIONS THAT COULD AFFECT EEG:   No anti-seizure medications    TECHNICAL 
SUMMARY:  This is a digital video EEG recorded with 32 input channels reviewed 
with bipolar and referential montages using the modified combinatorial system 
nomenclature.   DESCRIPTION OF RECORD:  During the maximally alert state, a 
well-developed, well-modulated 10.5-11 Hz posterior dominant rhythm was seen 
that was symmetric, reactive to eye opening and well regulated.  Low voltage 
beta activity predominated anteriorly in bilateral frontal regions. During 
drowsiness, there is an attenuation of a posterior dominant rhythm and an 
increase in fronto-central theta. Stage 2 sleep was reached and characterized by
symmetric sleep spindles. HV: Hyperventilation was not performed.    PHOTIC 
STIMULATION: Photic stimulation was performed from 1-33 Hz. Photic stimulation 
didn't elicit abnormal discharges.     EVENTS: No clinical or electrographic 
seizures were recorded.    IMPRESSION:  Normal Awake and Drowsy/Sleep EEG 
CLINICAL CORRELATION: This is a normal record in the awake and sleep states. No 
focal areas of neuronal dysfunction or epileptiform discharges. No clinical or 
electrographic seizures were recorded.  An EEG without epileptiform discharges 
does not exclude the possibility of epilepsy.  If the clinical suspicion of 
epilepsy remains, consider additional EEG recordings.    Samra Nash MD 
Neurophysiology/ Epilepsy Fellow  I have reviewed this electroencephalogram, 
discussed the findings with the fellow, addended the fellow's report and agree 
with the overall assessment.   Rikki Ji MD, MS Clinical Neurophysiology/Ep
ilepsy Attending      Electronically signed by: RIKKI JI MD on 
2021 05:48 PMEEG AWAKE AND [id number] 17:48:00Interface, External 
Ris In - 2021  5:48 PM CDTNEUROPHYSIOLOGY EEG REPORT DATES OF TEST: 
21    DATE OF REPORT: 21            Name: Kasandra Cuellar  MRN: 
[id number] ACC #:  [id number] EEG Number:  Start time: 2:59 PM Stop time: 
3:19 PM CPT Code: 40286 ICD-10: R56.9   HISTORY: 55-year-old man presented with 
right sided SDH and experiencing left sided numbness   MEDICATIONS THAT COULD AF
FECT EEG:   No anti-seizure medications    TECHNICAL SUMMARY:  This is a digital
video EEG recorded with 32 input channels reviewed with bipolar and referential 
montages using the modified combinatorial system nomenclature.   DESCRIPTION OF 
RECORD:  During the maximally alert state, a well-developed, well-modulated 
10.5-11 Hz posterior dominant rhythm was seen that was symmetric, reactive to 
eye opening and well regulated.  Low voltage beta activity predominated 
anteriorly in bilateral frontal regions. During drowsiness, there is an 
attenuation of a posterior dominant rhythm and an increase in fronto-central 
theta. Stage 2 sleep was reached and characterized by symmetric sleep spindles. 
                                            HV: Hyperventilation was not 
performed.    PHOTIC STIMULATION: Photic stimulation was performed from 1-33 Hz.
Photic stimulation didn't elicit abnormal discharges.     EVENTS: No clinical or
electrographic seizures were recorded.    IMPRESSION:  Normal Awake and 
Drowsy/Sleep EEG CLINICAL CORRELATION:  This is a normal record in the awake and
sleep states.No focal areas of neuronal dysfunction or epileptiform discharges. 
No clinical or electrographic seizures were recorded.  An EEG without 
epileptiform discharges does not exclude the possibility of epilepsy.  If the 
clinical suspicion of epilepsy remains, consider additional EEG recordings.    
Samra Nash MD Neurophysiology/ Epilepsy Fellow     I have reviewed this 
electroencephalogram, discussed the findings with the fellow, addended the 
fellow's report and agree with the overall assessment.   Rikki Ji MD, MS 
Clinical Neurophysiology/Epilepsy Attending      Electronically signed by: RIKKI JI MD on 2021 05:48 [facility]UCOSE [id number] 12:41:00





             Test Item    Value        Reference Range Interpretation Comments

 

             POC-GLUCOSE METER 140 mg/dL           H            : Notified

 RN/MD:



             (WILLAMARK) (test code =                                        TESTED

 AT [facility] [address]



             [address]38)                                               [place] [facility],



                                                                 [address]:



                                                                 /Techni

jenise ID



                                                                 = [id number] for MELIA TORRES



CT, CTANGIO  [id number] 07:59:00Unlisted Reason for Exam - Click Yes and 
Enter Reason Below-&gt;YesUnlisted Reason for Exam-&gt;TIA w/u
************************************************************[facility] KAS[facility]AP[facility]ONILLA        : 1965        Sex: 
M************************************************************FINAL REPORT 
PATIENT ID:   [id number] CLINICAL HISTORY: Unlisted Reason for ExamTIA w/u 
TECHNIQUE: Contiguous contrast-enhanced axial images through the neck followed 
by axial images through the head with coronal and sagittal reformations to 
assess the arterial circulation. 3-D reconstructions were performed using a 
volume rendered technique separately on a workstation. This exam was performed 
according to the departmental dose optimization program which includes automated
exposure control, adjustment of the mA and/or kV according to the patient size, 
and/or use of an iterative reconstruction technique. COMPARISON: MRI brain 
2021, head CT 4/3/2021 FINDINGS: A left-sided craniotomy is again seen. Left
greater than right cerebral convexity subdural collections are grossly unchanged
allowing for differences in technique. Right midline shift of the septum 
pellucidum is unchanged. The CT angiogram images of the head reveal no evidence 
of intracranial aneurysm, critical stenosis, or large vessel occlusion.  The 
major intradural venous sinuses appear patent. There is no hemodynamically 
significant stenosis in either cervical internal carotid artery by NASCET 
criteria. The vertebral arteriesin the neck are patent including their origins. 
There is left vertebral dominance. There are dorsal spondylitic changes in the 
cervical spine. There are scattered subcentimeter lymph nodes in the neck.The 
visualized lung apices are clear.   IMPRESSION: No evidence for a [place] 
[place] large vessel occlusion. No evidence of hemodynamically significant 
stenosis in the cervical carotid or vertebral arteries by NASCET criteria. 
Signed: Theodora Marroquin MDReport Verified Date/Time:  2021 07:59:19 Reading 
Location: [address] Neuro Reading Room      Electronically signed by: THEODORA MARROQUIN M.D. on 2021 07:59 AMCT, CAROTID, [id number] 07:59:00
Unlisted Reason for Exam - Click Yes and Enter Reason Below-&gt;No
************************************************************[facility]        : 1965        Sex: 
M************************************************************FINAL REPORT 
PATIENT ID:   [id number] CLINICAL HISTORY: Unlisted Reason for ExamTIA w/u 
TECHNIQUE: Contiguous contrast-enhanced axial images through the neck followed 
by axial images through the head with coronal and sagittal reformations to 
assess the arterial circulation. 3-D reconstructions were performed using a 
volume rendered technique separately on a workstation. This exam was performed 
according to the departmental dose optimization program which includes automated
exposure control, adjustment of the mA and/or kV according to the patient size, 
and/or use of an iterative reconstruction technique. COMPARISON: MRI brain 
2021, head CT 4/3/2021 FINDINGS: A left-sided craniotomy is again seen. Left
greater than right cerebral convexity subdural collections are grossly unchanged
allowing for differences in technique. Right midline shift of the septum 
pellucidum is unchanged. The CT angiogram images of the head reveal no evidence 
of intracranial aneurysm, critical stenosis, or large vessel occlusion.  The 
major intradural venous sinuses appear patent. There is no hemodynamically 
significant stenosis in either cervical internal carotid artery by NASCET 
criteria. The vertebral arteriesin the neck are patent including their origins. 
There is left vertebral dominance. There are dorsal spondylitic changes in the 
cervical spine. There are scattered subcentimeter lymph nodes in the neck.The 
visualized lung apices are clear.   IMPRESSION: No evidence for a [place] 
[place] large vessel occlusion. No evidence of hemodynamically significant 
stenosis in the cervical carotid or vertebral arteries by NASCET criteria. 
Signed: Theodora Marroquin MDReport Verified Date/Time:  2021 07:59:19 Reading 
Location: [address] Neuro Reading Room      Electronically signed by: THEODORA MARROQUIN M.D. on 2021 07:59 AMCTA [id number] 07:59:00Interface, 
External Ris In - 2021  8:01 AM CDTFINAL REPORT PATIENT ID:   [id number] 
CLINICAL HISTORY: Unlisted Reason for ExamTIA w/u TECHNIQUE: Contiguous 
contrast-enhanced axial images through the neck followed by axial images through
the head with coronal and sagittal reformations to assess thearterial 
circulation. 3-D reconstructions were performed using a volume rendered 
technique separately on a workstation. This exam was performed according to the 
departmental dose optimization program which includes automated exposure 
control, adjustment of the mA and/or kV according to the patient size, and/or 
use of an iterative reconstruction technique. COMPARISON: MRI brain 2021, 
head CT 4/3/2021 FINDINGS: A left-sided craniotomy is again seen. Left greater 
than right cerebral convexity subdural collections are grossly unchanged 
allowing for differences in technique. Right midline shift of the septum 
pellucidum is unchanged. The CT angiogram images of the head reveal no evidence 
of intracranial aneurysm, critical stenosis, or large vessel occlusion.  The 
major intradural venous sinuses appear patent. There is no hemodynamically 
significant stenosis in either cervical internal carotid artery by NASCET 
criteria. The vertebral arteries in the neck are patent including their origins.
There is left vertebral dominance. There are dorsal spondylitic changes in the 
cervical spine. There are scattered subcentimeter lymph nodes in the neck. The 
visualized lung apices are clear.   IMPRESSION: No evidence for a [place] 
[place] large vessel occlusion. No evidence of hemodynamically significant amy
nosis in the cervical carotid or vertebral arteries by NASCET criteria. Signed: 
Theodora Marroquin Verified Date/Time:  2021 07:59:19 Reading 
Location: [address] Neuro Reading Room      Electronically signed by: THEODORA MARROQUIN M.D. on 2021 07:59 [facility] AngelesCT[facility] carotid
2021 07:59:00Interface, External Ris In - 2021  8:01 AM CDTFINAL 
REPORT PATIENT ID:   [id number] CLINICAL HISTORY: Unlisted Reason for ExamTIA w/u 
TECHNIQUE: Contiguous contrast-enhanced axial images through the neck followed 
by axial images through the head with coronal and sagittal reformations to 
assess thearterial circulation. 3-D reconstructions were performed using a 
volume rendered technique separately on a workstation. This exam was performed 
according to the departmental dose optimization program which includes automated
exposure control, adjustment of the mA and/or kV according to the patient size, 
and/or use of an iterative reconstruction technique. COMPARISON: MRI brain 
2021, head CT 4/3/2021 FINDINGS: A left-sided craniotomy is again seen. Left
greater than right cerebral convexity subdural collections are grossly unchanged
allowing for differences in technique. Right midline shift of the septum 
pellucidum is unchanged. The CT angiogram images of the head reveal no evidence 
of intracranial aneurysm, critical stenosis, or large vessel occlusion.  The 
major intradural venous sinuses appear patent. There is no hemodynamically 
significant stenosis in either cervical internal carotid artery by NASCET 
criteria. The vertebral arteries in the neck are patent including their origins.
There is left vertebral dominance. There are dorsal spondylitic changes in the 
cervical spine. There are scattered subcentimeter lymph nodes in the neck. The 
visualized lung apices are clear.   IMPRESSION: No evidence for a [place] 
[place] large vessel occlusion. No evidence of hemodynamically significant amy
nosis in the cervical carotid or vertebral arteries by NASCET criteria. Signed: 
Theodora Marroquin Verified Date/Time:  2021 07:59:19 Reading 
Location: [address] Neuro Reading Room      Electronically signed by: THEODORA MARROQUIN M.D. on 2021 07:59 [facility] AngelesMAGN[facility]
2021 05:38:00





             Test Item    Value        Reference Range Interpretation Comments

 

             MAGNESIUM (BEAKER) 2.2 mg/dL    1.6-2.6                   Specimen 

slightly



             (test code = 627)                                        hemolyzed



 ID - BSOnce on admission and Daily AM afterwardsOnce on admission and 
Daily AM [id number] 05:38:00





             Test Item    Value        Reference Range Interpretation Comments

 

             PHOSPHORUS (BEAKER) 3.9 mg/dL    2.3-4.7                   Specimen

 slightly



             (test code = 604)                                        hemolyzed



 ID - BSOnce on admission and Daily AM afterwardsOnce on admission and 
Daily AM afterwardsBASIC METABOLIC [id number] 05:38:00





             Test Item    Value        Reference Range Interpretation Comments

 

             SODIUM (BEAKER) 135 meq/L    136-145      L            



             (test code = 381)                                        

 

             POTASSIUM (BEAKER) 4.8 meq/L    3.5-5.1                   Specimen 

slightly



             (test code = 379)                                        hemolyzed

 

             CHLORIDE (BEAKER) 102 meq/L                        



             (test code = 382)                                        

 

             CO2 (BEAKER) (test 23 meq/L     22-29                     



             code = 355)                                         

 

             BLOOD UREA NITROGEN 13 mg/dL     7-21                      



             (BEAKER) (test code                                        



             = 354)                                              

 

             CREATININE (BEAKER) 0.95 mg/dL   0.57-1.25                 Specimen

 slightly



             (test code = 358)                                        hemolyzed

 

             GLUCOSE RANDOM 89 mg/dL                         



             (BEAKER) (test code                                        



             = 652)                                              

 

             CALCIUM (BEAKER) 9.3 mg/dL    8.4-10.2                  



             (test code = 697)                                        

 

             EGFR (BEAKER) (test 83 mL/min/1.73                           ESTIMA

ALESSIA GFR IS



             code = 1092) sq m                                   NOT AS ACCURATE

 AS



                                                                 CREATININE



                                                                 CLEARANCE IN



                                                                 PREDICTING



                                                                 GLOMERULAR



                                                                 FILTRATION RATE

.



                                                                 ESTIMATED GFR I

S



                                                                 NOT APPLICABLE 

FOR



                                                                 DIALYSIS PATIEN

TS.



 ID - BSOnce on admission and Daily AM afterwardsOnce on admission and 
Daily AM afterwardsCBC W/PLT COUNT &amp; AUTO [id number] 05:17:00





             Test Item    Value        Reference Range Interpretation Comments

 

             WHITE BLOOD CELL COUNT (BEAKER) 6.3 K/ L     3.5-10.5              

    



             (test code = 775)                                        

 

             RED BLOOD CELL COUNT (BEAKER) 4.36 M/ L    4.63-6.08    L          

  



             (test code = 761)                                        

 

             HEMOGLOBIN (BEAKER) (test code = 13.6 GM/DL   13.7-17.5    L       

     



             410)                                                

 

             HEMATOCRIT (BEAKER) (test code = 41.1 %       40.1-51.0            

     



             411)                                                

 

             MEAN CORPUSCULAR VOLUME (BEAKER) 94.3 fL      79.0-92.2    H       

     



             (test code = 753)                                        

 

             MEAN CORPUSCULAR HEMOGLOBIN 31.2 pg      25.7-32.2                 



             (BEAKER) (test code = 751)                                        

 

             MEAN CORPUSCULAR HEMOGLOBIN CONC 33.1 GM/DL   32.3-36.5            

     



             (BEAKER) (test code = 752)                                        

 

             RED CELL DISTRIBUTION WIDTH 12.3 %       11.6-14.4                 



             (BEAKER) (test code = 412)                                        

 

             PLATELET COUNT (BEAKER) (test 191 K/CU MM  150-450                 

  



             code = 756)                                         

 

             MEAN PLATELET VOLUME (BEAKER) 11.4 fL      9.4-12.4                

  



             (test code = 754)                                        

 

             NUCLEATED RED BLOOD CELLS 0 /100 WBC   0-0                       



             (BEAKER) (test code = 413)                                        

 

             NEUTROPHILS RELATIVE PERCENT 60 %                                  

 



             (BEAKER) (test code = 429)                                        

 

             LYMPHOCYTES RELATIVE PERCENT 30 %                                  

 



             (BEAKER) (test code = 430)                                        

 

             MONOCYTES RELATIVE PERCENT 8 %                                    



             (BEAKER) (test code = 431)                                        

 

             EOSINOPHILS RELATIVE PERCENT 2 %                                   

 



             (BEAKER) (test code = 432)                                        

 

             BASOPHILS RELATIVE PERCENT 1 %                                    



             (BEAKER) (test code = 437)                                        

 

             NEUTROPHILS ABSOLUTE COUNT 3.74 K/ L    1.78-5.38                 



             (BEAKER) (test code = 670)                                        

 

             LYMPHOCYTES ABSOLUTE COUNT 1.86 K/ L    1.32-3.57                 



             (BEAKER) (test code = 414)                                        

 

             MONOCYTES ABSOLUTE COUNT (BEAKER) 0.47 K/ L    0.30-0.82           

      



             (test code = 415)                                        

 

             EOSINOPHILS ABSOLUTE COUNT 0.12 K/ L    0.04-0.54                 



             (BEAKER) (test code = 416)                                        

 

             BASOPHILS ABSOLUTE COUNT (BEAKER) 0.05 K/ L    0.01-0.08           

      



             (test code = 417)                                        

 

             IMMATURE GRANULOCYTES-RELATIVE 1 %          0-1                    

   



             PERCENT (BEAKER) (test code =                                      

  



             2801)                                               



MR, BRAIN, WITHOUT [id number] 01:47:00Unlisted Reason for Exam - Click 
Yes and Enter Reason Below-&gt;No
********************************************************[facility]        : 1965        Sex: 
M************************************************************FINAL REPORT 
PATIENT ID:   [id number] MRI Brain without contrast Clinical History: TIA, initial
exam Technique: MRI of the brain utilizing axial T2, FLAIR, GRE, DWI; sagittal 
and coronal T1-weighted images. Comparisons: CT head dated 4/3/2021 
Findings:Status post left sided craniotomy for drainage of a left cerebral 
convexity subdural hematoma.Mixed density left cerebral convexity subdural 
hematoma is again noted measuring up to 1.0 cm, similar when compared to prior. 
Few small foci of gas within the collection likely related to recent 
instrumentation. There is mild mass effect on adjacent structures. Thin mixed 
density subdural hematoma along the right superior cerebral convexity measuring 
up to 7 mm possibly slightly increased in size in the interval however 
differences in technique limitsevaluation. There is hyperintense FLAIR signal 
within the subarachnoid spaces of the right superior surgical convexity 
suggestive of small volume subarachnoid hemorrhage. There is 5 mm of rightward 
midline shift when measured at the septum pellucidum, unchanged. Mild effacement
of the left lateral ventricle. Multiple bilateral T2 and FLAIR hyperintense 
white matter foci likely represent chronic whitematter microvascular 
disease.There is no evidence of acute infarct .    The craniocervical junction i
s preserved. The major intracranial flow-voids appear patent.  Paranasal sinuses
are clear. Middle ears and mastoid air cells are clear. Intraorbital contents 
are unremarkable.  Postsurgical changes inthe left scalp. IMPRESSION: Status 
post left sided craniotomy for drainage of a left cerebral convexity subdural 
hematoma. Grossly unchanged size of the left cerebral convexity subdural with 
persistentmass effect on adjacent structures including mild effacement of the 
left lateral ventricle. Unchanged 5 mm of rightward midline shift..  Possible 
slight interval increase in size of the mixed density subdural hematoma along 
the right cerebral convexity.  Findings above suggestive of small volume subara
chnoid hemorrhage in the right cerebral hemisphere.  Signed: Roxanne Christian
MDReport Verified Date/Time:  2021 01:47:24   Electronically signed by: 
ROXANNE CHRISTIAN MD on 2021 01:47 AMMR brain without IV contrast
2021 01:47:00Interface, External Ris In - 2021  1:50 AM CDTFINAL 
REPORT PATIENT ID:   [id number] MRI Brain without contrast Clinical History: TIA, 
initial exam Technique: MRI of the brain utilizing axial T2, FLAIR, GRE, DWI; 
sagittal and coronal T1-weighted images. Comparisons: CT head dated 4/3/2021 
Findings:Status post left sided craniotomy for drainage of a left cerebral 
convexity subdural hematoma.Mixed density left cerebral convexity subdural 
hematoma is again noted measuring up to 1.0 cm, similar when compared to prior. 
Few small foci of gas within the collection likely related to recent 
instrumentation. There is mild mass effect on adjacent structures. Thin mixed 
density subdural hematoma along the right superior cerebral convexity measuring 
up to 7 mm possibly slightly increased in size in the interval however 
differences in technique limits evaluation. There is hyperintense FLAIR signal 
within the subarachnoid spaces of the right superior surgical convexity 
suggestive of small volume subarachnoid hemorrhage. There is 5 mm of rightward 
midline shift when measured at the septum pellucidum, unchanged. Mild effacement
of the left lateral ventricle. Multiple bilateral T2 and FLAIR hyperintense whit
e matter foci likely represent chronic white matter microvascular disease.There 
is no evidence of acute infarct .    The craniocervical junction is preserved. 
The major intracranial flow-voids appear patent.  Paranasal sinuses are clear. 
Middle ears and mastoid air cells are clear. Intraorbital contents are 
unremarkable.  Postsurgical changes in the left scalp. IMPRESSION: Status post 
left sided craniotomy for drainage of a left cerebral convexity subdural 
hematoma. Grossly unchanged size of the left cerebral convexity subdural with 
persistent mass effect on adjacent structures including mild effacement of the 
left lateral ventricle. Unchanged 5 mm of rightward midline shift..  Possible 
slight interval increase in size of the mixed density subdural hematoma along 
the right cerebral convexity.  Findings above suggestive of small volume 
subarachnoid hemorrhage in the right cerebral hemisphere.  Signed: Roxanne Christian Verified Date/Time:  2021 01:47:24   Electronically 
signed by: ROXANNE CHRISTIAN MD on 2021 01:47 [facility] function [id number] 18:54:00





             Test Item    Value        Reference Range Interpretation Comments

 

             Protein, Total (test 7.3          See_Comment                [Autom

ated



             code = 2885-2)                                        message] The



                                                                 system which



                                                                 generated this



                                                                 result transmit

alessia



                                                                 reference range

:



                                                                 6.0 - 8.3 gm/dL

.



                                                                 The reference



                                                                 range was not u

sed



                                                                 to interpret th

is



                                                                 result as



                                                                 normal/abnormal

.

 

             Albumin (test code = 4.3 g/dL     3.5-5                     



             41407-2)                                            

 

             Total Bilirubin (test 0.5 mg/dL    0.2-1.2                   



             code = 1975-2)                                        

 

             Bilirubin, Direct 0.2 mg/dL    0.1-0.5                   



             (test code = 1968-7)                                        

 

             Alkaline Phosphatase 60 U/L                           



             (test code = 6768-6)                                        

 

             AST (test code = 21 U/L       5-34                      



             1920-8)                                             

 

             ALT (test code = 31 U/L       6-55                      



             1742-6)                                             

 

             SCOOTER (test code = SCOOTER)  ID -                           



                          BS                                     

 

             Lab Interpretation Normal                                 



             (test code = 43266-3)                                        



[facility] [id number] 18:54:00





             Test Item    Value        Reference Range Interpretation Comments

 

             TOTAL PROTEIN (BEAKER) (test code = 7.3 gm/dL    6.0-8.3           

        



             770)                                                

 

             ALBUMIN (BEAKER) (test code = 1145) 4.3 g/dL     3.5-5.0           

        

 

             BILIRUBIN TOTAL (BEAKER) (test code 0.5 mg/dL    0.2-1.2           

        



             = 377)                                              

 

             BILIRUBIN DIRECT (BEAKER) (test 0.2 mg/dL    0.1-0.5               

    



             code = 706)                                         

 

             ALKALINE PHOSPHATASE (BEAKER) (test 60 U/L                   

        



             code = 346)                                         

 

             AST (SGOT) (BEAKER) (test code = 21 U/L       5-34                 

     



             353)                                                

 

             ALT (SGPT) (BEAKER) (test code = 31 U/L       6-55                 

     



             347)                                                



 ID - BSBASIC METABOLIC [id number] 18:53:00





             Test Item    Value        Reference Range Interpretation Comments

 

             SODIUM (BEAKER) 138 meq/L    136-145                   



             (test code = 381)                                        

 

             POTASSIUM (BEAKER) 4.2 meq/L    3.5-5.1                   



             (test code = 379)                                        

 

             CHLORIDE (BEAKER) 104 meq/L                        



             (test code = 382)                                        

 

             CO2 (BEAKER) (test 26 meq/L     22-29                     



             code = 355)                                         

 

             BLOOD UREA NITROGEN 12 mg/dL     7-21                      



             (BEAKER) (test code                                        



             = 354)                                              

 

             CREATININE (BEAKER) 0.89 mg/dL   0.57-1.25                 



             (test code = 358)                                        

 

             GLUCOSE RANDOM 104 mg/dL                        



             (BEAKER) (test code                                        



             = 652)                                              

 

             CALCIUM (BEAKER) 9.2 mg/dL    8.4-10.2                  



             (test code = 697)                                        

 

             EGFR (BEAKER) (test 89 mL/min/1.73                           ESTIMA

ALESSIA GFR IS



             code = 1092) sq m                                   NOT AS ACCURATE

 AS



                                                                 CREATININE



                                                                 CLEARANCE IN



                                                                 PREDICTING



                                                                 GLOMERULAR



                                                                 FILTRATION RATE

.



                                                                 ESTIMATED GFR I

S



                                                                 NOT APPLICABLE 

FOR



                                                                 DIALYSIS PATIEN

TS.



 ID - [id number] 18:53:00





             Test Item    Value        Reference Range Interpretation Comments

 

             MAGNESIUM (BEAKER) (test code = 2.2 mg/dL    1.6-2.6               

    



             627)                                                



 ID - [id number] 18:53:00





             Test Item    Value        Reference Range Interpretation Comments

 

             PHOSPHORUS (BEAKER) (test code = 3.4 mg/dL    2.3-4.7              

     



             604)                                                



 ID - [id number] 18:48:00





             Test Item    Value        Reference Range Interpretation Comments

 

             Fibrinogen (test code = 3255-7) 545 mg/dl    225-434      H        

    

 

             Lab Interpretation (test code = Abnormal                           

    



             93429-8)                                            



[facility]





             Test Item    Value        Reference Range Interpretation Comments

 

             FIBRINOGEN LEVEL (BEAKER) (test 545 mg/dl    225-434      H        

    



             code = 658)                                         



[id number] 18:39:00





             Test Item    Value        Reference Range Interpretation Comments

 

             PTT (test code = 76036-4) 35.0         See_Comment                [

Automated message]



                                                                 The system [employer]



                                                                 generated this 

result



                                                                 transmitted ref

erence



                                                                 range: 22.5 - 3

6.0



                                                                 seconds. The re

ference



                                                                 range was not u

sed to



                                                                 interpret this 

result



                                                                 as normal/abnor

mal.

 

             Lab Interpretation (test Normal                                 



             code = 10910-8)                                        



[facility]AP[facility] 18[facility]39:00





             Test Item    Value        Reference Range Interpretation Comments

 

             PARTIAL THROMBOPLASTIN TIME 35.0 seconds 22.5-36.0                 



             (BEAKER) (test code = 760)                                        



Prothrombin time/[id number] 18:38:00





             Test Item    Value        Reference    Interpretation Comments



                                       Range                     

 

             Protime (test code = 13.9         See_Comment                [Autom

ated



             5902-2)                                             message] The



                                                                 system which



                                                                 generated this



                                                                 result



                                                                 transmitted



                                                                 reference range

:



                                                                 11.9 - 14.2



                                                                 seconds. The



                                                                 reference range



                                                                 was not used to



                                                                 interpret this



                                                                 result as



                                                                 normal/abnormal

.

 

             INR (test code = 1.10         See_Comment                [Automated



             7961-6)                                             message] The



                                                                 system which



                                                                 generated this



                                                                 result



                                                                 transmitted



                                                                 reference range

:



                                                                 <=5.90. The



                                                                 reference range



                                                                 was not used to



                                                                 interpret this



                                                                 result as



                                                                 normal/abnormal

.

 

             SCOOTER (test code = Effective 2019:                           



             SCOOTER)         PT Reference Range                           



                          ChangeNew: 11.9-14.2                           



                           Previous: 11.7-14.7                           



                          RECOMMENDED                            



                          COUMADIN/WARFARIN                           



                          INR THERAPY                            



                          RANGESSTANDARD DOSE:                           



                          2.0-3.0  Includes:                           



                          PROPHYLAXIS for                           



                          venous thrombosis,                           



                          systemic                               



                          embolization;                           



                          TREATMENT for venous                           



                          thrombosis and/or                           



                          pulmonary                              



                          embolus.HIGH RISK:                           



                          Target INR is                           



                          2.5-3.5 for patients                           



                          wiht mechanical                           



                          heart valves.                           

 

             Lab Interpretation Normal                                 



             (test code =                                        



             73393-6)                                            



[facility] HospitalPROTHROMBIN TIME/[id number] 18:38:00





             Test Item    Value        Reference Range Interpretation Comments

 

             PROTIME (BEAKER) 13.9 seconds 11.9-14.2                 



             (test code = 759)                                        

 

             INR (BEAKER) (test 1.10         See_Comment                [Automat

ed message]



             code = 370)                                         The system [employer]



                                                                 generated this 

result



                                                                 transmitted ref

erence



                                                                 range: <=5.90. 

The



                                                                 reference range

 was



                                                                 not used to int

erpret



                                                                 this result as



                                                                 normal/abnormal

.



Effective 2019: PT Reference Range ChangeNew: 11.9-14.2  Previous: 11.7-
14.7RECOMMENDED COUMADIN/WARFARIN INR THERAPY RANGESSTANDARD DOSE: 2.0-3.0  
Includes: PROPHYLAXIS for venous thrombosis, systemic embolization; TREATMENT 
for venous thrombosis and/or pulmonary embolus.HIGH RISK: Target INR is2.5-3.5 
for patients wiht mechanical heart valves.CBC W/PLT COUNT &amp; AUTO 
[id number] 18:26:00





             Test Item    Value        Reference Range Interpretation Comments

 

             WHITE BLOOD CELL COUNT (BEAKER) 6.1 K/ L     3.5-10.5              

    



             (test code = 775)                                        

 

             RED BLOOD CELL COUNT (BEAKER) 4.37 M/ L    4.63-6.08    L          

  



             (test code = 761)                                        

 

             HEMOGLOBIN (BEAKER) (test code = 13.8 GM/DL   13.7-17.5            

     



             410)                                                

 

             HEMATOCRIT (BEAKER) (test code = 40.4 %       40.1-51.0            

     



             411)                                                

 

             MEAN CORPUSCULAR VOLUME (BEAKER) 92.4 fL      79.0-92.2    H       

     



             (test code = 753)                                        

 

             MEAN CORPUSCULAR HEMOGLOBIN 31.6 pg      25.7-32.2                 



             (BEAKER) (test code = 751)                                        

 

             MEAN CORPUSCULAR HEMOGLOBIN CONC 34.2 GM/DL   32.3-36.5            

     



             (BEAKER) (test code = 752)                                        

 

             RED CELL DISTRIBUTION WIDTH 12.5 %       11.6-14.4                 



             (BEAKER) (test code = 412)                                        

 

             PLATELET COUNT (BEAKER) (test 199 K/CU MM  150-450                 

  



             code = 756)                                         

 

             MEAN PLATELET VOLUME (BEAKER) 11.4 fL      9.4-12.4                

  



             (test code = 754)                                        

 

             NUCLEATED RED BLOOD CELLS 0 /100 WBC   0-0                       



             (BEAKER) (test code = 413)                                        

 

             NEUTROPHILS RELATIVE PERCENT 71 %                                  

 



             (BEAKER) (test code = 429)                                        

 

             LYMPHOCYTES RELATIVE PERCENT 21 %                                  

 



             (BEAKER) (test code = 430)                                        

 

             MONOCYTES RELATIVE PERCENT 6 %                                    



             (BEAKER) (test code = 431)                                        

 

             EOSINOPHILS RELATIVE PERCENT 1 %                                   

 



             (BEAKER) (test code = 432)                                        

 

             BASOPHILS RELATIVE PERCENT 1 %                                    



             (BEAKER) (test code = 437)                                        

 

             NEUTROPHILS ABSOLUTE COUNT 4.35 K/ L    1.78-5.38                 



             (BEAKER) (test code = 670)                                        

 

             LYMPHOCYTES ABSOLUTE COUNT 1.25 K/ L    1.32-3.57    L            



             (BEAKER) (test code = 414)                                        

 

             MONOCYTES ABSOLUTE COUNT (BEAKER) 0.38 K/ L    0.30-0.82           

      



             (test code = 415)                                        

 

             EOSINOPHILS ABSOLUTE COUNT 0.08 K/ L    0.04-0.54                 



             (BEAKER) (test code = 416)                                        

 

             BASOPHILS ABSOLUTE COUNT (BEAKER) 0.03 K/ L    0.01-0.08           

      



             (test code = 417)                                        

 

             IMMATURE GRANULOCYTES-RELATIVE 0 %          0-1                    

   



             PERCENT (BEAKER) (test code =                                      

  



             2801)                                               



CT, BRAIN, WITHOUT [id number] 10:26:00Unlisted Reason for Exam - Click 
Yes and Enter Reason Below-&gt;YesUnlisted Reason for Exam-&gt;SDH follow up 
imaging************************************************************[facility]         : 1965        Sex: 
M************************************************************FINAL REPORT 
PATIENT ID:   [id number] CT, BRAIN, WITHOUT CONTRAST CLINICAL INDICATION:  
Subdural hemorrhage, follow-upSDH follow up imaging COMPARISON: 2021 
TECHNIQUE:  Noncontrast axial CT imaging of the brain and skull.  DOSE 
REDUCTION: Dose modulation, iterative reconstruction, and/or weight-based 
adjustment of the mA/kV was utilized to reduce the radiation dose to as low as 
reasonably achievable. FINDINGS:Status post left frontal parietal craniotomy for
drainage of subdural hematoma. Subdural drain has been removed in the interim. 
Residual left cerebral convexity mixed density hematoma and pneumocephalus is 
not significantly changed in thickness, measuring up to 2.2 cm. Contralateral 
mixed density right cerebral convexity extra-axial hematoma is similarly 
unchanged, measuring up to6 mm. Midline shift is not significantly changed in 
the interim given differences in angulation and technique, approximately 6 mm 
rightward midline shift. No herniation. Mild effacement of the left lateral 
ventricle is unchanged. No hydrocephalus. Orbits are within normal limits. No 
obstructive paranasal sinus disease.  IMPRESSION: Interval removal of left 
subdural drain. Otherwise, no significant interval change in bilateral cerebral 
convexity mixed density subdural hematomas and mild consequent rightward midline
shift. If there is persistent clinical concern for intracranial pathology, MR 
examination is recommended for further characterization. Signed: Radha Laura
MDReport Verified Date/Time:  2021 10:26:18 Reading Location: [facility] C013V
Neuro Reading Room  Electronically signed by: RADHA LAURA MD on 
2021 10:26 AMBASIC METABOLIC [id number] 06:08:00





             Test Item    Value        Reference Range Interpretation Comments

 

             SODIUM (BEAKER) 134 meq/L    136-145      L            



             (test code = 381)                                        

 

             POTASSIUM (BEAKER) 4.0 meq/L    3.5-5.1                   



             (test code = 379)                                        

 

             CHLORIDE (BEAKER) 99 meq/L                         



             (test code = 382)                                        

 

             CO2 (BEAKER) (test 25 meq/L     22-29                     



             code = 355)                                         

 

             BLOOD UREA NITROGEN 14 mg/dL     7-21                      



             (BEAKER) (test code                                        



             = 354)                                              

 

             CREATININE (BEAKER) 0.99 mg/dL   0.57-1.25                 



             (test code = 358)                                        

 

             GLUCOSE RANDOM 107 mg/dL           H            



             (BEAKER) (test code                                        



             = 652)                                              

 

             CALCIUM (BEAKER) 9.4 mg/dL    8.4-10.2                  



             (test code = 697)                                        

 

             EGFR (BEAKER) (test 79 mL/min/1.73                           ESTIMA

ALESSIA GFR IS



             code = 1092) sq m                                   NOT AS ACCURATE

 AS



                                                                 CREATININE



                                                                 CLEARANCE IN



                                                                 PREDICTING



                                                                 GLOMERULAR



                                                                 FILTRATION RATE

.



                                                                 ESTIMATED GFR I

S



                                                                 NOT APPLICABLE 

FOR



                                                                 DIALYSIS PATIEN

TS.



 ID - [id number] 06:08:00





             Test Item    Value        Reference Range Interpretation Comments

 

             MAGNESIUM (BEAKER) (test code = 2.0 mg/dL    1.6-2.6               

    



             627)                                                



 ID - [id number] 06:08:00





             Test Item    Value        Reference Range Interpretation Comments

 

             PHOSPHORUS (BEAKER) (test code = 3.8 mg/dL    2.3-4.7              

     



             604)                                                



 ID - DBCBC W/PLT COUNT &amp; AUTO [id number] 05:37:00





             Test Item    Value        Reference Range Interpretation Comments

 

             WHITE BLOOD CELL COUNT (BEAKER) 7.9 K/ L     3.5-10.5              

    



             (test code = 775)                                        

 

             RED BLOOD CELL COUNT (BEAKER) 4.60 M/ L    4.63-6.08    L          

  



             (test code = 761)                                        

 

             HEMOGLOBIN (BEAKER) (test code = 14.5 GM/DL   13.7-17.5            

     



             410)                                                

 

             HEMATOCRIT (BEAKER) (test code = 42.1 %       40.1-51.0            

     



             411)                                                

 

             MEAN CORPUSCULAR VOLUME (BEAKER) 91.5 fL      79.0-92.2            

     



             (test code = 753)                                        

 

             MEAN CORPUSCULAR HEMOGLOBIN 31.5 pg      25.7-32.2                 



             (BEAKER) (test code = 751)                                        

 

             MEAN CORPUSCULAR HEMOGLOBIN CONC 34.4 GM/DL   32.3-36.5            

     



             (BEAKER) (test code = 752)                                        

 

             RED CELL DISTRIBUTION WIDTH 12.9 %       11.6-14.4                 



             (BEAKER) (test code = 412)                                        

 

             PLATELET COUNT (BEAKER) (test 196 K/CU MM  150-450                 

  



             code = 756)                                         

 

             MEAN PLATELET VOLUME (BEAKER) 11.8 fL      9.4-12.4                

  



             (test code = 754)                                        

 

             NUCLEATED RED BLOOD CELLS 0 /100 WBC   0-0                       



             (BEAKER) (test code = 413)                                        

 

             NEUTROPHILS RELATIVE PERCENT 60 %                                  

 



             (BEAKER) (test code = 429)                                        

 

             LYMPHOCYTES RELATIVE PERCENT 28 %                                  

 



             (BEAKER) (test code = 430)                                        

 

             MONOCYTES RELATIVE PERCENT 8 %                                    



             (BEAKER) (test code = 431)                                        

 

             EOSINOPHILS RELATIVE PERCENT 2 %                                   

 



             (BEAKER) (test code = 432)                                        

 

             BASOPHILS RELATIVE PERCENT 1 %                                    



             (BEAKER) (test code = 437)                                        

 

             NEUTROPHILS ABSOLUTE COUNT 4.74 K/ L    1.78-5.38                 



             (BEAKER) (test code = 670)                                        

 

             LYMPHOCYTES ABSOLUTE COUNT 2.20 K/ L    1.32-3.57                 



             (BEAKER) (test code = 414)                                        

 

             MONOCYTES ABSOLUTE COUNT (BEAKER) 0.66 K/ L    0.30-0.82           

      



             (test code = 415)                                        

 

             EOSINOPHILS ABSOLUTE COUNT 0.18 K/ L    0.04-0.54                 



             (BEAKER) (test code = 416)                                        

 

             BASOPHILS ABSOLUTE COUNT (BEAKER) 0.05 K/ L    0.01-0.08           

      



             (test code = 417)                                        

 

             IMMATURE GRANULOCYTES-RELATIVE 0 %          0-1                    

   



             PERCENT (BEAKER) (test code =                                      

  



             2801)                                               



BASIC METABOLIC [id number] 04:37:00





             Test Item    Value        Reference Range Interpretation Comments

 

             SODIUM (BEAKER) 135 meq/L    136-145      L            



             (test code = 381)                                        

 

             POTASSIUM (BEAKER) 4.0 meq/L    3.5-5.1                   



             (test code = 379)                                        

 

             CHLORIDE (BEAKER) 100 meq/L                        



             (test code = 382)                                        

 

             CO2 (BEAKER) (test 25 meq/L     22-29                     



             code = 355)                                         

 

             BLOOD UREA NITROGEN 15 mg/dL     7-21                      



             (BEAKER) (test code                                        



             = 354)                                              

 

             CREATININE (BEAKER) 0.94 mg/dL   0.57-1.25                 



             (test code = 358)                                        

 

             GLUCOSE RANDOM 108 mg/dL           H            



             (BEAKER) (test code                                        



             = 652)                                              

 

             CALCIUM (BEAKER) 9.1 mg/dL    8.4-10.2                  



             (test code = 697)                                        

 

             EGFR (BEAKER) (test 84 mL/min/1.73                           ESTIMA

ALESSIA GFR IS



             code = 1092) sq m                                   NOT AS ACCURATE

 AS



                                                                 CREATININE



                                                                 CLEARANCE IN



                                                                 PREDICTING



                                                                 GLOMERULAR



                                                                 FILTRATION RATE

.



                                                                 ESTIMATED GFR I

S



                                                                 NOT APPLICABLE 

FOR



                                                                 DIALYSIS PATIEN

TS.



 ID - [id number] 04:37:00





             Test Item    Value        Reference Range Interpretation Comments

 

             MAGNESIUM (BEAKER) (test code = 2.1 mg/dL    1.6-2.6               

    



             627)                                                



 ID - [id number] 04:37:00





             Test Item    Value        Reference Range Interpretation Comments

 

             PHOSPHORUS (BEAKER) (test code = 3.7 mg/dL    2.3-4.7              

     



             604)                                                



 ID - EDASICBC W/PLT COUNT &amp; AUTO [id number] 04:25:00





             Test Item    Value        Reference Range Interpretation Comments

 

             WHITE BLOOD CELL COUNT (BEAKER) 6.7 K/ L     3.5-10.5              

    



             (test code = 775)                                        

 

             RED BLOOD CELL COUNT (BEAKER) 4.59 M/ L    4.63-6.08    L          

  



             (test code = 761)                                        

 

             HEMOGLOBIN (BEAKER) (test code = 14.5 GM/DL   13.7-17.5            

     



             410)                                                

 

             HEMATOCRIT (BEAKER) (test code = 43.7 %       40.1-51.0            

     



             411)                                                

 

             MEAN CORPUSCULAR VOLUME (BEAKER) 95.2 fL      79.0-92.2    H       

     



             (test code = 753)                                        

 

             MEAN CORPUSCULAR HEMOGLOBIN 31.6 pg      25.7-32.2                 



             (BEAKER) (test code = 751)                                        

 

             MEAN CORPUSCULAR HEMOGLOBIN CONC 33.2 GM/DL   32.3-36.5            

     



             (BEAKER) (test code = 752)                                        

 

             RED CELL DISTRIBUTION WIDTH 12.9 %       11.6-14.4                 



             (BEAKER) (test code = 412)                                        

 

             PLATELET COUNT (BEAKER) (test 175 K/CU MM  150-450                 

  



             code = 756)                                         

 

             MEAN PLATELET VOLUME (BEAKER) 11.6 fL      9.4-12.4                

  



             (test code = 754)                                        

 

             NUCLEATED RED BLOOD CELLS 0 /100 WBC   0-0                       



             (BEAKER) (test code = 413)                                        

 

             NEUTROPHILS RELATIVE PERCENT 60 %                                  

 



             (BEAKER) (test code = 429)                                        

 

             LYMPHOCYTES RELATIVE PERCENT 27 %                                  

 



             (BEAKER) (test code = 430)                                        

 

             MONOCYTES RELATIVE PERCENT 10 %                                   



             (BEAKER) (test code = 431)                                        

 

             EOSINOPHILS RELATIVE PERCENT 2 %                                   

 



             (BEAKER) (test code = 432)                                        

 

             BASOPHILS RELATIVE PERCENT 1 %                                    



             (BEAKER) (test code = 437)                                        

 

             NEUTROPHILS ABSOLUTE COUNT 4.00 K/ L    1.78-5.38                 



             (BEAKER) (test code = 670)                                        

 

             LYMPHOCYTES ABSOLUTE COUNT 1.79 K/ L    1.32-3.57                 



             (BEAKER) (test code = 414)                                        

 

             MONOCYTES ABSOLUTE COUNT (BEAKER) 0.69 K/ L    0.30-0.82           

      



             (test code = 415)                                        

 

             EOSINOPHILS ABSOLUTE COUNT 0.15 K/ L    0.04-0.54                 



             (BEAKER) (test code = 416)                                        

 

             BASOPHILS ABSOLUTE COUNT (BEAKER) 0.04 K/ L    0.01-0.08           

      



             (test code = 417)                                        

 

             IMMATURE GRANULOCYTES-RELATIVE 0 %          0-1                    

   



             PERCENT (BEAKER) (test code =                                      

  



             2801)                                               



CBC W/PLT COUNT &amp; AUTO [id number] 04:36:00





             Test Item    Value        Reference Range Interpretation Comments

 

             WHITE BLOOD CELL COUNT (BEAKER) 7.9 K/ L     3.5-10.5              

    



             (test code = 775)                                        

 

             RED BLOOD CELL COUNT (BEAKER) 4.26 M/ L    4.63-6.08    L          

  



             (test code = 761)                                        

 

             HEMOGLOBIN (BEAKER) (test code = 13.5 GM/DL   13.7-17.5    L       

     



             410)                                                

 

             HEMATOCRIT (BEAKER) (test code = 39.8 %       40.1-51.0    L       

     



             411)                                                

 

             MEAN CORPUSCULAR VOLUME (BEAKER) 93.4 fL      79.0-92.2    H       

     



             (test code = 753)                                        

 

             MEAN CORPUSCULAR HEMOGLOBIN 31.7 pg      25.7-32.2                 



             (BEAKER) (test code = 751)                                        

 

             MEAN CORPUSCULAR HEMOGLOBIN CONC 33.9 GM/DL   32.3-36.5            

     



             (BEAKER) (test code = 752)                                        

 

             RED CELL DISTRIBUTION WIDTH 13.2 %       11.6-14.4                 



             (BEAKER) (test code = 412)                                        

 

             PLATELET COUNT (BEAKER) (test 132 K/CU MM  150-450      L          

  



             code = 756)                                         

 

             MEAN PLATELET VOLUME (BEAKER) 11.1 fL      9.4-12.4                

  



             (test code = 754)                                        

 

             NUCLEATED RED BLOOD CELLS 0 /100 WBC   0-0                       



             (BEAKER) (test code = 413)                                        

 

             NEUTROPHILS RELATIVE PERCENT 64 %                                  

 



             (BEAKER) (test code = 429)                                        

 

             LYMPHOCYTES RELATIVE PERCENT 23 %                                  

 



             (BEAKER) (test code = 430)                                        

 

             MONOCYTES RELATIVE PERCENT 10 %                                   



             (BEAKER) (test code = 431)                                        

 

             EOSINOPHILS RELATIVE PERCENT 2 %                                   

 



             (BEAKER) (test code = 432)                                        

 

             BASOPHILS RELATIVE PERCENT 0 %                                    



             (BEAKER) (test code = 437)                                        

 

             NEUTROPHILS ABSOLUTE COUNT 5.11 K/ L    1.78-5.38                 



             (BEAKER) (test code = 670)                                        

 

             LYMPHOCYTES ABSOLUTE COUNT 1.86 K/ L    1.32-3.57                 



             (BEAKER) (test code = 414)                                        

 

             MONOCYTES ABSOLUTE COUNT (BEAKER) 0.77 K/ L    0.30-0.82           

      



             (test code = 415)                                        

 

             EOSINOPHILS ABSOLUTE COUNT 0.15 K/ L    0.04-0.54                 



             (BEAKER) (test code = 416)                                        

 

             BASOPHILS ABSOLUTE COUNT (BEAKER) 0.03 K/ L    0.01-0.08           

      



             (test code = 417)                                        

 

             IMMATURE GRANULOCYTES-RELATIVE 0 %          0-1                    

   



             PERCENT (BEAKER) (test code =                                      

  



             2801)                                               



[id number] 03:54:00





             Test Item    Value        Reference Range Interpretation Comments

 

             MAGNESIUM (BEAKER) 2.0 mg/dL    1.6-2.6                   Specimen 

slightly



             (test code = 627)                                        hemolyzed



 ID - LOGAN [id number] 03:54:00





             Test Item    Value        Reference Range Interpretation Comments

 

             PHOSPHORUS (BEAKER) 3.4 mg/dL    2.3-4.7                   Specimen

 slightly



             (test code = 604)                                        hemolyzed



 ID - LOGAN MBASIC METABOLIC [id number] 03:54:00





             Test Item    Value        Reference Range Interpretation Comments

 

             SODIUM (BEAKER) 134 meq/L    136-145      L            



             (test code = 381)                                        

 

             POTASSIUM (BEAKER) 4.0 meq/L    3.5-5.1                   Specimen 

slightly



             (test code = 379)                                        hemolyzed

 

             CHLORIDE (BEAKER) 103 meq/L                        



             (test code = 382)                                        

 

             CO2 (BEAKER) (test 20 meq/L     22-29        L            



             code = 355)                                         

 

             BLOOD UREA NITROGEN 16 mg/dL     7-21                      



             (BEAKER) (test code                                        



             = 354)                                              

 

             CREATININE (BEAKER) 0.92 mg/dL   0.57-1.25                 Specimen

 slightly



             (test code = 358)                                        hemolyzed

 

             GLUCOSE RANDOM 99 mg/dL                         



             (BEAKER) (test code                                        



             = 652)                                              

 

             CALCIUM (BEAKER) 8.7 mg/dL    8.4-10.2                  



             (test code = 697)                                        

 

             EGFR (BEAKER) (test 86 mL/min/1.73                           ESTIMA

ALESSIA GFR IS



             code = 1092) sq m                                   NOT AS ACCURATE

 AS



                                                                 CREATININE



                                                                 CLEARANCE IN



                                                                 PREDICTING



                                                                 GLOMERULAR



                                                                 FILTRATION RATE

.



                                                                 ESTIMATED GFR I

S



                                                                 NOT APPLICABLE 

FOR



                                                                 DIALYSIS PATIEN

TS.



 ID - LOGAN MCT, BRAIN, WITHOUT [id number] 07:26:00Unlisted 
Reason for Exam - Click Yes and Enter Reason Below-&gt;No
************************************************************[facility]         : 1965        Sex: 
M************************************************************FINAL REPORT 
PATIENT ID:   [id number] CT Head without contrast CLINICAL HISTORY: Cerebral 
hemorrhage suspected TECHNIQUE: Contiguous axial CT images through the head 
without contrast. This exam was performed according to the departmental dose 
optimization program which includes automated exposure control, adjustment of 
the mA and/or kV according to the patient size, and/or use of an iterative 
reconstruction technique. COMPARISON: 3/30/2021 FINDINGS: There is interval 
left-sided craniotomy and extra-axial drainage catheter placement with 
subsegmental decrease in size of the left cerebral convexity subdural hematoma 
now measuring 0.8 cm in thickness, previously 2.1 cm. Pneumocephalus is 
compatible with the recent postsurgical status. There is decreased mass effect 
compressing the left lateral ventricle with decreased rightward midline shift of
a cavum septum pellucidum measuring 0.5 cm, previously 1.0 cm. Asymmetric 
dilatation of the right temporal horn has subsided. Medial left uncal deviation 
and effacement of the ambient cisterns is also decreased.  There is a new right 
cerebral vertex subdural hemorrhage measuring 4 mm in thickness, without 
significant mass effect. There is no CT evidence for acute infarction. 
IMPRESSION: Since 3/30/2021, status post decompression of the left-sided subdura
l hematoma with substantially decreased mass effect. Interval development of a 
thin right cerebral vertex subdural hemorrhage measuring 4 mm in thickness, for 
which attention on follow-up is recommended. Signed: Theodora MarroquinV[facility] Administration [facility] Center 
Verified Date/Time:  2021 07:26:39 Reading Location: [address] Neuro 
Reading Room      Electronically signed by: THEODORA MARROQUIN M.D. on 2021 
07:26 AMBASIC METABOLIC [id number] 04:40:00





             Test Item    Value        Reference Range Interpretation Comments

 

             SODIUM (BEAKER) 135 meq/L    136-145      L            



             (test code = 381)                                        

 

             POTASSIUM (BEAKER) 4.0 meq/L    3.5-5.1                   



             (test code = 379)                                        

 

             CHLORIDE (BEAKER) 103 meq/L                        



             (test code = 382)                                        

 

             CO2 (BEAKER) (test 24 meq/L     22-29                     



             code = 355)                                         

 

             BLOOD UREA NITROGEN 14 mg/dL     7-21                      



             (BEAKER) (test code                                        



             = 354)                                              

 

             CREATININE (BEAKER) 1.00 mg/dL   0.57-1.25                 



             (test code = 358)                                        

 

             GLUCOSE RANDOM 113 mg/dL           H            



             (BEAKER) (test code                                        



             = 652)                                              

 

             CALCIUM (BEAKER) 8.9 mg/dL    8.4-10.2                  



             (test code = 697)                                        

 

             EGFR (BEAKER) (test 78 mL/min/1.73                           ESTIMA

ALESSIA GFR IS



             code = 1092) sq m                                   NOT AS ACCURATE

 AS



                                                                 CREATININE



                                                                 CLEARANCE IN



                                                                 PREDICTING



                                                                 GLOMERULAR



                                                                 FILTRATION RATE

.



                                                                 ESTIMATED GFR I

S



                                                                 NOT APPLICABLE 

FOR



                                                                 DIALYSIS PATIEN

TS.



 ID - LOGAN [id number] 04:40:00





             Test Item    Value        Reference Range Interpretation Comments

 

             MAGNESIUM (BEAKER) (test code = 1.9 mg/dL    1.6-2.6               

    



             627)                                                



 ID - LOGAN [id number] 04:40:00





             Test Item    Value        Reference Range Interpretation Comments

 

             PHOSPHORUS (BEAKER) (test code = 3.5 mg/dL    2.3-4.7              

     



             604)                                                



 ID - LOGAN MCBC W/PLT COUNT &amp; AUTO [id number] 04:27:00





             Test Item    Value        Reference Range Interpretation Comments

 

             WHITE BLOOD CELL COUNT (BEAKER) 10.8 K/ L    3.5-10.5     H        

    



             (test code = 775)                                        

 

             RED BLOOD CELL COUNT (BEAKER) 4.48 M/ L    4.63-6.08    L          

  



             (test code = 761)                                        

 

             HEMOGLOBIN (BEAKER) (test code = 14.2 GM/DL   13.7-17.5            

     



             410)                                                

 

             HEMATOCRIT (BEAKER) (test code = 41.9 %       40.1-51.0            

     



             411)                                                

 

             MEAN CORPUSCULAR VOLUME (BEAKER) 93.5 fL      79.0-92.2    H       

     



             (test code = 753)                                        

 

             MEAN CORPUSCULAR HEMOGLOBIN 31.7 pg      25.7-32.2                 



             (BEAKER) (test code = 751)                                        

 

             MEAN CORPUSCULAR HEMOGLOBIN CONC 33.9 GM/DL   32.3-36.5            

     



             (BEAKER) (test code = 752)                                        

 

             RED CELL DISTRIBUTION WIDTH 12.9 %       11.6-14.4                 



             (BEAKER) (test code = 412)                                        

 

             PLATELET COUNT (BEAKER) (test 157 K/CU MM  150-450                 

  



             code = 756)                                         

 

             MEAN PLATELET VOLUME (BEAKER) 11.8 fL      9.4-12.4                

  



             (test code = 754)                                        

 

             NUCLEATED RED BLOOD CELLS 0 /100 WBC   0-0                       



             (BEAKER) (test code = 413)                                        

 

             NEUTROPHILS RELATIVE PERCENT 78 %                                  

 



             (BEAKER) (test code = 429)                                        

 

             LYMPHOCYTES RELATIVE PERCENT 13 %                                  

 



             (BEAKER) (test code = 430)                                        

 

             MONOCYTES RELATIVE PERCENT 8 %                                    



             (BEAKER) (test code = 431)                                        

 

             EOSINOPHILS RELATIVE PERCENT 0 %                                   

 



             (BEAKER) (test code = 432)                                        

 

             BASOPHILS RELATIVE PERCENT 0 %                                    



             (BEAKER) (test code = 437)                                        

 

             NEUTROPHILS ABSOLUTE COUNT 8.44 K/ L    1.78-5.38    H            



             (BEAKER) (test code = 670)                                        

 

             LYMPHOCYTES ABSOLUTE COUNT 1.40 K/ L    1.32-3.57                 



             (BEAKER) (test code = 414)                                        

 

             MONOCYTES ABSOLUTE COUNT (BEAKER) 0.91 K/ L    0.30-0.82    H      

      



             (test code = 415)                                        

 

             EOSINOPHILS ABSOLUTE COUNT 0.01 K/ L    0.04-0.54    L            



             (BEAKER) (test code = 416)                                        

 

             BASOPHILS ABSOLUTE COUNT (BEAKER) 0.01 K/ L    0.01-0.08           

      



             (test code = 417)                                        

 

             IMMATURE GRANULOCYTES-RELATIVE 0 %          0-1                    

   



             PERCENT (BEAKER) (test code =                                      

  



             2801)                                               



ECG 12 [id number] 13:59:49Interface, External Ris In - 2021  1:59 PM 
CDTVentricular Rate 50 BPMAtrial Rate 50 BPMP-R Interval 194 msQRS Duration 94 
msQ-T Interval 430 msQTC Calculation(Bazett) 392 msP Axis 31 degreesR Axis -7 
degreesT Axis 6 degreesSinus bradycardiaOtherwise normal ECGNo previous ECGs 
availableConfirmed by MD Seven, Pancho ([id number]) on 3/30/2021 1:59:44 PM
[facility]/Free T4 If [id number] 08:07:00





             Test Item    Value        Reference Range Interpretation Comments

 

             TSH (test code = 2.822        See_Comment                [Automated



             51078-7)                                            message] The



                                                                 system which



                                                                 generated this



                                                                 result transmit

alessia



                                                                 reference range

:



                                                                 0.350 - 4.940



                                                                 uIU/mL. The



                                                                 reference range



                                                                 was not used to



                                                                 interpret this



                                                                 result as



                                                                 normal/abnormal

.

 

             SCOOTER (test code = SCOOTER)  ID -                           



                          PIAYA L                                

 

             Lab Interpretation Normal                                 



             (test code = 16814-7)                                        



[facility]/FREE T4 IF [id number] 08:07:00





             Test Item    Value        Reference Range Interpretation Comments

 

             THYROID STIMULATING HORMONE 2.822 uIU/mL 0.350-4.940               



             (BEAKER) (test code = 772)                                        



 ID - CAMILLA LCT, BRAIN, WITHOUT [id number] 08:05:00Followup from
[place] scan. Previously read as 2cm diameter with 12-13mm MLSUnlisted Reason
for Exam - Click Yes and Enter Reason Below-&gt;No
************************************************************[facility]         : 1965        Sex: 
M************************************************************FINAL REPORT 
PATIENT ID:   [id number] CT Head without contrast CLINICAL HISTORY: Subdural 
hematoma TECHNIQUE: Contiguous axial CT images through the head without 
contrast. This exam was performed according to the departmental dose 
optimization program which includes automated exposure control, adjustment of 
the mA and/or kV according to the patient size, and/or use of an iterative 
reconstruction technique. COMPARISON: None FINDINGS: There is a left cerebral 
convexity subdural hematoma measuring 2.1cm in thickness with a layering 
hematocrit level. There is mass effect compressing the left cerebralhemisphere 
and narrowing the left lateral ventricle. There is rightward midline shift of 
the septum pellucidum by 1.0 cm. Asymmetric dilatation of the right temporal 
horn is compatible with early/partial entrapment. Medial left uncal deviation is
also seen effacing the bilateral ambient cisterns with rightward brainstem 
displacement. There is no CT evidence for acute ischemia. There is no skull 
fracture. IMPRESSION: Left cerebral convexity subdural hematoma with mass effect
and rightward midline shift including uncal deviation. Asymmetric dilatation of 
the right temporal horn compatible with early/partial ventricular entrapment. 
Given the stated history of subdural hematoma, comparison with outside imaging 
is recommended to determine stability or change. Signed: Theodora Marroquin MDReport 
Verified Date/Time:  2021 08:05:59 Reading Location: [facility] C013V Neuro 
Reading Room      Electronically signed by: THEODORA MARROQUIN M.D. on 2021 
08:05 [facility] -[id number] 04:24:00





             Test Item    Value        Reference Range Interpretation Comments

 

             Troponin I (test code = <0.01        0-0.03                    



             06259-0)                                            

 

             SCOOTER (test code = SCOOTER) Troponin I (TnI)                           



                          levels must be                           



                          interpreted in the                           



                          context of the                           



                          presenting symptoms                           



                          and the clinical                           



                          findings. Elevated                           



                          TnI levels indicate                           



                          myocardial damage,                           



                          but are not specific                           



                          for ischemic heart                           



                          disease. Elevated TnI                           



                          levels are seen in                           



                          patients with other                           



                          cardiac conditions                           



                          (including                             



                          myocarditis and                           



                          congestive heart                           



                          failure), and slight                           



                          TnI elevations occur                           



                          in patients with                           



                          other conditions,                           



                          including sepsis,                           



                          renal failure,                           



                          acidosis, acute                           



                          neurological disease,                           



                          and persistent                           



                          tachyarrhythmia.Opera                           



                          tor ID - PIAYA L                           

 

             Lab Interpretation (test Normal                                 



             code = 26789-4)                                        



[facility] -34-20 04:24:00





             Test Item    Value        Reference Range Interpretation Comments

 

             TROPONIN I (BEAKER) (test code = 397) < ng/mL      0.00-0.03       

          








             Test Item    Value        Reference Range Interpretation Comments

 

             Triglycerides (test 184 mg/dL                              



             code = 2571-8)                                        

 

             Cholesterol (test code 193 mg/dL                              



             = 2093-3)                                           

 

             HDL (test code = 34 mg/dL                               



             2085-9)                                             

 

             LDL Calculated (test 122 mg/dL                              



             code = 08365-4)                                        

 

             SCOOTER (test code = SCOOTER) Triglyceride Reference                       

    



                          Range:   Low Risk                           



                             <150   Borderline                           



                           150-199   High Risk                           



                            200-499   Very High                           



                          Risk  >=500                            



                          Cholesterol Reference                           



                          Range:   Low Risk                           



                             <200   Borderline                           



                           200-239    High Risk                           



                                >240 HDL                           



                          Cholesterol Reference                           



                          Range:   Low Risk                           



                             >=60   High Risk                           



                               <40 LDL                           



                          Cholesterol Reference                           



                          Range:   Optimal                           



                             <100   Near Optimal                           



                           100-129   Borderline                           



                            130-159   High                           



                             160-189   Very High                           



                                >=190                            



                           ELVIRA SAMPSON DANTE MESSINA [facility] METABOLIC [id number] 04:22:00





             Test Item    Value        Reference Range Interpretation Comments

 

             SODIUM (BEAKER) 138 meq/L    136-145                   



             (test code = 381)                                        

 

             POTASSIUM (BEAKER) 4.2 meq/L    3.5-5.1                   



             (test code = 379)                                        

 

             CHLORIDE (BEAKER) 107 meq/L                        



             (test code = 382)                                        

 

             CO2 (BEAKER) (test 24 meq/L     22-29                     



             code = 355)                                         

 

             BLOOD UREA NITROGEN 15 mg/dL     7-21                      



             (BEAKER) (test code                                        



             = 354)                                              

 

             CREATININE (BEAKER) 0.92 mg/dL   0.57-1.25                 



             (test code = 358)                                        

 

             GLUCOSE RANDOM 97 mg/dL                         



             (BEAKER) (test code                                        



             = 652)                                              

 

             CALCIUM (BEAKER) 8.3 mg/dL    8.4-10.2     L            



             (test code = 697)                                        

 

             EGFR (BEAKER) (test 86 mL/min/1.73                           ESTIMA

ALESSIA GFR IS



             code = 1092) sq m                                   NOT AS ACCURATE

 AS



                                                                 CREATININE



                                                                 CLEARANCE IN



                                                                 PREDICTING



                                                                 GLOMERULAR



                                                                 FILTRATION RATE

.



                                                                 ESTIMATED GFR I

S



                                                                 NOT APPLICABLE 

FOR



                                                                 DIALYSIS PATIEN

TS.



 ID Corbin SAMPSON [id number] 04:22:00





             Test Item    Value        Reference Range Interpretation Comments

 

             MAGNESIUM (BEAKER) (test code = 2.1 mg/dL    1.6-2.6               

    



             627)                                                



 ELVIRA SAMPSON [id number] 04:22:00





             Test Item    Value        Reference Range Interpretation Comments

 

             PHOSPHORUS (BEAKER) (test code = 3.7 mg/dL    2.3-4.7              

     



             604)                                                



 ELVIRA SAMPSON LLIPID [id number] 04:22:00





             Test Item    Value        Reference Range Interpretation Comments

 

             TRIGLYCERIDES (BEAKER) (test code = 184 mg/dL                      

        



             540)                                                

 

             CHOLESTEROL (BEAKER) (test code = 193 mg/dL                        

      



             631)                                                

 

             HDL CHOLESTEROL (BEAKER) (test code 34 mg/dL                       

        



             = 976)                                              

 

             LDL CHOLESTEROL CALCULATED (BEAKER) 122 mg/dL                      

        



             (test code = 633)                                        



Triglyceride Reference Range:   Low Risk         &lt;150   Borderline    150-199
  High Risk     200-499   Very High Risk  &gt;=500Cholesterol Reference Range:  
Low Risk         &lt;200   Borderline 200-239    High Risk        &gt;240HDL 
Cholesterol Reference Range:   Low Risk         &gt;=60   High Risk         
&lt;40LDL Cholesterol Reference Range:   Optimal          &lt;100   Near Optimal
 100-129   Borderline    130-159   High          160-189   Very High       
&gt;=190    ID - PIAYALCBC W/PLT COUNT &amp; AUTO [id number]
04:02:00





             Test Item    Value        Reference Range Interpretation Comments

 

             WHITE BLOOD CELL COUNT (BEAKER) 5.6 K/ L     3.5-10.5              

    



             (test code = 775)                                        

 

             RED BLOOD CELL COUNT (BEAKER) 4.29 M/ L    4.63-6.08    L          

  



             (test code = 761)                                        

 

             HEMOGLOBIN (BEAKER) (test code = 13.6 GM/DL   13.7-17.5    L       

     



             410)                                                

 

             HEMATOCRIT (BEAKER) (test code = 40.0 %       40.1-51.0    L       

     



             411)                                                

 

             MEAN CORPUSCULAR VOLUME (BEAKER) 93.2 fL      79.0-92.2    H       

     



             (test code = 753)                                        

 

             MEAN CORPUSCULAR HEMOGLOBIN 31.7 pg      25.7-32.2                 



             (BEAKER) (test code = 751)                                        

 

             MEAN CORPUSCULAR HEMOGLOBIN CONC 34.0 GM/DL   32.3-36.5            

     



             (BEAKER) (test code = 752)                                        

 

             RED CELL DISTRIBUTION WIDTH 13.0 %       11.6-14.4                 



             (BEAKER) (test code = 412)                                        

 

             PLATELET COUNT (BEAKER) (test 119 K/CU MM  150-450      L          

  



             code = 756)                                         

 

             MEAN PLATELET VOLUME (BEAKER) 11.5 fL      9.4-12.4                

  



             (test code = 754)                                        

 

             NUCLEATED RED BLOOD CELLS 0 /100 WBC   0-0                       



             (BEAKER) (test code = 413)                                        

 

             NEUTROPHILS RELATIVE PERCENT 60 %                                  

 



             (BEAKER) (test code = 429)                                        

 

             LYMPHOCYTES RELATIVE PERCENT 29 %                                  

 



             (BEAKER) (test code = 430)                                        

 

             MONOCYTES RELATIVE PERCENT 7 %                                    



             (BEAKER) (test code = 431)                                        

 

             EOSINOPHILS RELATIVE PERCENT 3 %                                   

 



             (BEAKER) (test code = 432)                                        

 

             BASOPHILS RELATIVE PERCENT 1 %                                    



             (BEAKER) (test code = 437)                                        

 

             NEUTROPHILS ABSOLUTE COUNT 3.33 K/ L    1.78-5.38                 



             (BEAKER) (test code = 670)                                        

 

             LYMPHOCYTES ABSOLUTE COUNT 1.61 K/ L    1.32-3.57                 



             (BEAKER) (test code = 414)                                        

 

             MONOCYTES ABSOLUTE COUNT (BEAKER) 0.40 K/ L    0.30-0.82           

      



             (test code = 415)                                        

 

             EOSINOPHILS ABSOLUTE COUNT 0.17 K/ L    0.04-0.54                 



             (BEAKER) (test code = 416)                                        

 

             BASOPHILS ABSOLUTE COUNT (BEAKER) 0.03 K/ L    0.01-0.08           

      



             (test code = 417)                                        

 

             IMMATURE GRANULOCYTES-RELATIVE 0 %          0-1                    

   



             PERCENT (BEAKER) (test code =                                      

  



             2801)                                               



SARS-CoV2/RT-PCR (Asymptomatic ONLY)2021 23:03:00





             Test Item    Value        Reference Range Interpretation Comments

 

             SARS-COV2/RT-PCR Negative     Not Detected,              



             (test code =              Negative, See              



             04163-3)                  external report              



                                       for linked test              

 

             SARS-COV-2   [facility]                                  



             PERFORMING LAB                                        



             (test code =                                        



             02802-1)                                            

 

             SCOOTER (test code = Negative results do not                           



             SCOOTER)         preclude SARS-CoV-2                           



                          infection and should not                           



                          be used as the sole                           



                          basis for patient                           



                          management decisions.                           



                          Negative results must be                           



                          combined with clinical                           



                          observations, patient                           



                          history, and                           



                          epidemiological                           



                          information. A false                           



                          negative result may                           



                          occur if a specimen is                           



                          improperly collected,                           



                          transported or handled.                           



                          The limit of detection                           



                          for this assay is 250                           



                          copies/mL. This SARS                           



                          CoV-2 test is a rapid,                           



                          real-time RT-PCR test                           



                          intended for the                           



                          qualitative detection of                           



                          nucleic acid from                           



                          SARS-CoV-2 in a                           



                          nasopharyngeal swab                           



                          specimen collected from                           



                          individuals suspected of                           



                          COVID-19 by their                           



                          healthcare provider.                           



                          This test has not been                           



                          Food and Drug                           



                          Administration (FDA)                           



                          cleared or approved and                           



                          has been authorized by                           



                          FDA under an Emergency                           



                          Use Authorization (EUA).                           



                          This EUA will be                           



                          effective until the                           



                          declaration that                           



                          circumstances exist                           



                          justifying the                           



                          authorization of the                           



                          emergency use of in                           



                          vitro diagnostic tests                           



                          for detection and/or                           



                          diagnosis of COVID-19 is                           



                          terminated under Section                           



                          564(b)(2) of the Act or                           



                          the EUA is revoked under                           



                          Section 564(g) of the                           



                          Act. Fact Sheet for                           



                          Healthcare                             



                          Providers:https://www.[employer].com/Documents/Xper                           



                          t%20Xpress%20SARS%20CoV-                           



                          2/Fact%20Sheets/302-3802                           



                          %48ZLTR-KLL-6%20HEALTHCA                           



                          RE%20PROVIDERS%20FACT%20                           



                          SHEET.pdf Fact Sheet for                           



                          Healthcare                             



                          Patients:https://www.[employer]/Documents/Xpert                           



                          %20Xpress%20SARS%20CoV-2                           



                          /Fact%20Sheets/302-3801%                           



                          36IDMV-SFT-8%20PATIENT%2                           



                          0FACT%20SHEET.pdf                           



                          Performing                             



                          Laboratory:[facility] [place], [place] [id number] [place], [place] [address]ARS-COV2/RT-PCR ([facility]; RE[facility])2021 
23:03:00





             Test Item    Value        Reference Range Interpretation Comments

 

             SARS-COV2/RT-PCR (test code Negative     Not Detected, Negative,   

           



             = 0730692)                See external report for              



                                       linked test               

 

             SARS-COV-2 PERFORMING LAB [facility]                                  



             (test code = 0083528)                                        



Negative results do not preclude SARS-CoV-2 infection and should not be used as 
the sole basis for patient management decisions. Negative results must be 
combined with clinical observations, patient history, and epidemiological 
information. A false negative result may occur if a specimen is improperly
collected, transported or handled.The limit of detection for this assay is 250 
copies/mL.This SARS CoV-2 test is a rapid, real-time RT-PCR test intended for 
the qualitative detection of nucleic acid from SARS-CoV-2 in a nasopharyngeal 
swab specimen collected from individuals suspected of COVID-19 by their 
healthcare provider.This test has not been Food and Drug Administration (FDA) 
cleared or approved and has been authorized by FDA under an Emergency Use 
Authorization (EUA). This EUA will be effective until the declaration that 
circumstances exist justifying the authorization of the emergency use of in 
vitro diagnostic tests for detection and/or diagnosis of COVID-19 is terminated 
under Section 564(b)(2) of the Act or the EUA is revoked under Section 564(g) of
the Act.Fact Sheet for Healthcare Pro
viders:https://www.[employer]/Documents/Xpert%20Xpress%20SARS%20CoV-2/Fact%20Sh

eets/302-3802%69OHSG-EIX-4%20HEALTHCARE%20PROVIDERS%20FACT%20SHEET.pdfFact Sheet
for Healthcare Patients:https://www.[employer]/Documents/Xpert%20Xpress%20SARS%20CoV-2/Fact%20Sheets/3023801%20SARS-COV

-2%20PATIENT%20FACT%20SHEET.pdfPerforming Laboratory:[facility], [place] [address]CIJTD, [id number] 22:13:00





             Test Item    Value        Reference Range Interpretation Comments

 

             ABO Grouping (test code = 2588) O                                  

    

 

             Rh Factor (test code = 2589) POS                                   

 



[facility] [id number] 21:59:00





             Test Item    Value        Reference Range Interpretation Comments

 

             ABO/RH AUTOMATED (BEAKER) (test O POSITIVE                         

    



             code = 2260)                                        

 

             Ab Scrn (test code = 890-4) NEGATIVE                               



[facility] 21[facility]56[facility]





             Test Item    Value        Reference Range Interpretation Comments

 

             PARTIAL THROMBOPLASTIN TIME 32.8 seconds 22.5-36.0                 



             (BEAKER) (test code = 760)                                        



[id number] 21:56:00





             Test Item    Value        Reference Range Interpretation Comments

 

             FIBRINOGEN LEVEL (BEAKER) (test 289 mg/dl    225-434               

    



             code = 658)                                         



PROTHROMBIN TIME/[id number] 21:55:00





             Test Item    Value        Reference Range Interpretation Comments

 

             PROTIME (BEAKER) 13.7 seconds 11.9-14.2                 



             (test code = 759)                                        

 

             INR (BEAKER) (test 1.10         See_Comment                [Automat

ed message]



             code = 370)                                         The system [employer]



                                                                 generated this 

result



                                                                 transmitted ref

erence



                                                                 range: <=5.90. 

The



                                                                 reference range

 was



                                                                 not used to int

erpret



                                                                 this result as



                                                                 normal/abnormal

.



Effective 2019: PT Reference Range ChangeNew: 11.9-14.2  Previous: 11.7-
14.7RECOMMENDED COUMADIN/WARFARIN INR THERAPY RANGESSTANDARD DOSE: 2.0-3.0  
Includes: PROPHYLAXIS for venous thrombosis, systemic embolization; TREATMENT 
for venous thrombosis and/or pulmonary embolus.HIGH RISK: Target INR is2.5-3.5 
for patients wiht mechanical heart valves.BASIC METABOLIC [id number] 
21:45:00





             Test Item    Value        Reference Range Interpretation Comments

 

             SODIUM (BEAKER) 140 meq/L    136-145                   



             (test code = 381)                                        

 

             POTASSIUM (BEAKER) 4.2 meq/L    3.5-5.1                   



             (test code = 379)                                        

 

             CHLORIDE (BEAKER) 107 meq/L                        



             (test code = 382)                                        

 

             CO2 (BEAKER) (test 24 meq/L     22-29                     



             code = 355)                                         

 

             BLOOD UREA NITROGEN 13 mg/dL     7-21                      



             (BEAKER) (test code                                        



             = 354)                                              

 

             CREATININE (BEAKER) 0.93 mg/dL   0.57-1.25                 



             (test code = 358)                                        

 

             GLUCOSE RANDOM 92 mg/dL                         



             (BEAKER) (test code                                        



             = 652)                                              

 

             CALCIUM (BEAKER) 8.9 mg/dL    8.4-10.2                  



             (test code = 697)                                        

 

             EGFR (BEAKER) (test 85 mL/min/1.73                           ESTIMA

ALESSIA GFR IS



             code = 1092) sq m                                   NOT AS ACCURATE

 AS



                                                                 CREATININE



                                                                 CLEARANCE IN



                                                                 PREDICTING



                                                                 GLOMERULAR



                                                                 FILTRATION RATE

.



                                                                 ESTIMATED GFR I

S



                                                                 NOT APPLICABLE 

FOR



                                                                 DIALYSIS PATIEN

TS.



 ID - [id number] 21:45:00





             Test Item    Value        Reference Range Interpretation Comments

 

             MAGNESIUM (BEAKER) (test code = 2.0 mg/dL    1.6-2.6               

    



             627)                                                



 ID - [id number] 21:45:00





             Test Item    Value        Reference Range Interpretation Comments

 

             PHOSPHORUS (BEAKER) (test code = 3.8 mg/dL    2.3-4.7              

     



             604)                                                



 ID - DBCBC W/PLT COUNT &amp; AUTO [id number] 21:29:00





             Test Item    Value        Reference Range Interpretation Comments

 

             WHITE BLOOD CELL COUNT (BEAKER) 5.5 K/ L     3.5-10.5              

    



             (test code = 775)                                        

 

             RED BLOOD CELL COUNT (BEAKER) 4.46 M/ L    4.63-6.08    L          

  



             (test code = 761)                                        

 

             HEMOGLOBIN (BEAKER) (test code = 14.1 GM/DL   13.7-17.5            

     



             410)                                                

 

             HEMATOCRIT (BEAKER) (test code = 42.2 %       40.1-51.0            

     



             411)                                                

 

             MEAN CORPUSCULAR VOLUME (BEAKER) 94.6 fL      79.0-92.2    H       

     



             (test code = 753)                                        

 

             MEAN CORPUSCULAR HEMOGLOBIN 31.6 pg      25.7-32.2                 



             (BEAKER) (test code = 751)                                        

 

             MEAN CORPUSCULAR HEMOGLOBIN CONC 33.4 GM/DL   32.3-36.5            

     



             (BEAKER) (test code = 752)                                        

 

             RED CELL DISTRIBUTION WIDTH 12.7 %       11.6-14.4                 



             (BEAKER) (test code = 412)                                        

 

             PLATELET COUNT (BEAKER) (test 121 K/CU MM  150-450      L          

  



             code = 756)                                         

 

             MEAN PLATELET VOLUME (BEAKER) 12.0 fL      9.4-12.4                

  



             (test code = 754)                                        

 

             NUCLEATED RED BLOOD CELLS 0 /100 WBC   0-0                       



             (BEAKER) (test code = 413)                                        

 

             NEUTROPHILS RELATIVE PERCENT 55 %                                  

 



             (BEAKER) (test code = 429)                                        

 

             LYMPHOCYTES RELATIVE PERCENT 34 %                                  

 



             (BEAKER) (test code = 430)                                        

 

             MONOCYTES RELATIVE PERCENT 7 %                                    



             (BEAKER) (test code = 431)                                        

 

             EOSINOPHILS RELATIVE PERCENT 4 %                                   

 



             (BEAKER) (test code = 432)                                        

 

             BASOPHILS RELATIVE PERCENT 1 %                                    



             (BEAKER) (test code = 437)                                        

 

             NEUTROPHILS ABSOLUTE COUNT 3.03 K/ L    1.78-5.38                 



             (BEAKER) (test code = 670)                                        

 

             LYMPHOCYTES ABSOLUTE COUNT 1.84 K/ L    1.32-3.57                 



             (BEAKER) (test code = 414)                                        

 

             MONOCYTES ABSOLUTE COUNT (BEAKER) 0.39 K/ L    0.30-0.82           

      



             (test code = 415)                                        

 

             EOSINOPHILS ABSOLUTE COUNT 0.19 K/ L    0.04-0.54                 



             (BEAKER) (test code = 416)                                        

 

             BASOPHILS ABSOLUTE COUNT (BEAKER) 0.03 K/ L    0.01-0.08           

      



             (test code = 417)                                        

 

             IMMATURE GRANULOCYTES-RELATIVE 0 %          0-1                    

   



             PERCENT (BEAKER) (test code =                                      

  



             2801)                                               



RAD, CHEST, 1 VIEW, NON [id number] 21:19:00Reason for exam:-&gt;SDHShould 
this be performed at the bedside?-&gt;Yes
************************************************************[facility]         : 1965        Sex: 
M************************************************************FINAL REPORT 
PATIENT ID:   [id number]  Chest one view. Clinical history: SDH Comparison: None. 
Technique: A single frontal view of the chest was obtained.  Findings: The 
cardiac silhouette is normal in size. The aorta is tortuous and atherosclerotic.
There is no focal pulmonary consolidation, pleuraleffusion or pneumothorax. 
There is no pulmonary edema. There are mild degenerative changes of the miguel a
ateral acromioclavicular joints. There is a small right subacromial spur. 
Impression: No focal pulmonary consolidation. Signed: Chase Rhoades Date/Time:  2021 21:19:03     Electronically signed by: CHASE RHOADES MD on 2021 09:19 PMXR chest 1 view portable / bedside
2021 21:19:00Interface, External Ris In - 2021  9:21 PM CDTFINAL 
REPORT PATIENT ID:   [id number]  Chest one view. Clinical history: SDH Comparison:
None. Technique: A single frontal view of the chest was obtained.  Findings: The
cardiac silhouette is normal in size. The aorta is tortuous and atherosclerotic.
There is no focal pulmonary consolidation, pleural effusion or pneumothorax. 
There is no pulmonary edema. There are mild degenerative changes of the 
bilateral acromioclavicular joints. There is a small right subacromial spur. 
Impression: No focal pulmonary consolidation. Signed: Chase Rhoades Ve
rified Date/Time:  2021 21:19:03     Electronically signed by: CHASE RHOADES MD on 2021 09:19 [facility] [place]

## 2021-08-11 NOTE — RAD REPORT
EXAM DESCRIPTION:  Ca Curtis (2 Views)8/11/2021 8:24 pm

 

CLINICAL HISTORY:  Cough

 

COMPARISON:  2018

 

FINDINGS:   The lungs appear clear of acute infiltrate. The heart is normal size

 

IMPRESSION:   No acute abnormalities displayed

## 2021-08-11 NOTE — ER
Nurse's Notes                                                                                     

 [facility]                                                                 

Name: Michael Cuellar                                                                                 

Age: 55 yrs                                                                                       

Sex: Male                                                                                         

: 1965                                                                                   

MRN: [id number]                                                                                   

Arrival Date: 2021                                                                          

Time: 20:02                                                                                       

Account#: [id number]                                                                            

Bed 5                                                                                             

Private MD:                                                                                       

Diagnosis: Paresthesia of skin                                                                    

                                                                                                  

Presentation:                                                                                     

                                                                                             

21:18 Chief complaint: Patient states: Pt stated, " I feel shakey, tingling, numbness,        kg  

      dizziness, dry mouth, feels like I'm going to pass out. Starting one hour ago.".            

      Coronavirus screen: Client denies travel out of the [place] in the last 14 days. At this       

      time, unable to obtain information related to travel outside the [place] Client presents       

      with at least one sign or symptom that may indicate coronavirus-19. Standard/surgical       

      mask placed on the client. Provider contacted for isolation considerations. Client          

      reports previous positive COVID test result. Date of collection: 2021. Ebola     

      Screen: Patient negative for fever greater than or equal to 101.5 degrees Fahrenheit,       

      and additional compatible Ebola Virus Disease symptoms Patient denies exposure to           

      infectious person. Patient denies travel to an Ebola-affected area in the 21 days           

      before illness onset. Initial Sepsis Screen: Does the patient meet any 2 criteria? No.      

      Patient's initial sepsis screen is negative. Does the patient have a suspected source       

      of infection? No. Patient's initial sepsis screen is negative. Risk Assessment: Do you      

      want to hurt yourself or someone else? Patient reports no desire to harm self or            

      others. Onset of symptoms was 2021 at 20:00.                                     

21:18 Method Of Arrival: Ambulatory                                                           kg  

21:18 Acuity: LAYLA 4                                                                           kg  

                                                                                                  

Triage Assessment:                                                                                

21:22 General: Appears in no apparent distress. Behavior is calm, cooperative, appropriate    kg  

      for age, quiet. Pain: Complains of pain in Generalized Pain currently is 3 out of 10 on     

      a pain scale. at worst was 3 out of 10 on a pain scale. level that patient reports is       

      acceptable is 3 out of 10 on a pain scale. Quality of pain is described as aching, Pain     

      began 1 hour ago.                                                                           

                                                                                                  

Historical:                                                                                       

- Allergies:                                                                                      

21:22 No Known Allergies;                                                                     kg  

- Home Meds:                                                                                      

21:22 lisinopril 20 mg Oral tab 1 tab once daily [Active];                                    kg  

- PMHx:                                                                                           

21:22 Hypertension; Brain bleed-craniotomy;                                                   kg  

- PSHx:                                                                                           

21:22 Craniotomy;                                                                             kg  

                                                                                                  

- Immunization history:: Adult Immunizations up to date, Client reports receiving the             

  2nd dose of the Covid vaccine, Date received: 2021 Pfizer Client reports               

  receiving the 1st dose of the Covid vaccine, 2021 Pfizer.                               

- Social history:: Smoking status: Patient/guardian denies using tobacco, but has a               

  distant history of tobacco abuse, Patient uses alcohol, weekly.                                 

                                                                                                  

                                                                                                  

Screenin:24 Abuse screen: Denies threats or abuse. Denies injuries from another. Nutritional        kg  

      screening: No deficits noted. Tuberculosis screening: No symptoms or risk factors           

      identified. Fall Risk None identified.                                                      

                                                                                                  

Assessment:                                                                                       

22:50 Reassessment: Patient and/or family updated on plan of care and expected duration. Pain ea  

      level reassessed. Patient is alert, oriented x 3, equal unlabored respirations, skin        

      warm/dry/pink.                                                                              

                                                                                                  

Vital Signs:                                                                                      

21:18  / 95; Pulse 63; Resp 20; Temp 98.1(O); Pulse Ox 100% on R/A; Weight 99.79 kg     kg  

      (R); Height 5 ft. 8 in. (172.72 cm); Pain 3/10;                                             

21:18 Body Mass Index 33.45 (99.79 kg, 172.72 cm)                                             kg  

                                                                                                  

ED Course:                                                                                        

20:02 Patient arrived in ED.                                                                  bp1 

20:24 XRAY Chest Pa And Lat (2 Views) In Process Unspecified.                                 EDMS

21:22 Triage completed.                                                                       kg  

21:22 Arm band placed on right wrist.                                                         kg  

21:24 Patient has correct armband on for positive identification.                             kg  

21:41 CT Head Brain wo Cont In Process Unspecified.                                           EDMS

22:01 Anastacio Chaves PA is PHCP.                                                               jr8 

22:01 Willis Harper MD is Attending Physician.                                                jr8 

22:50 No provider procedures requiring assistance completed. Patient did not have IV access   ea  

      during this emergency room visit.                                                           

                                                                                                  

Administered Medications:                                                                         

No medications were administered                                                                  

                                                                                                  

                                                                                                  

Outcome:                                                                                          

22:22 Discharge ordered by MD.                                                                jr8 

22:50 Discharged to home ambulatory.                                                          ea  

22:50 Condition: stable                                                                           

22:50 Discharge instructions given to patient, Instructed on discharge instructions, follow       

      up and referral plans. Demonstrated understanding of instructions, follow-up care.          

22:51 Patient left the ED.                                                                    ea  

                                                                                                  

Signatures:                                                                                       

Dispatcher [employer]                           Anastacio Wise PA PA   jr8                                                  

Desiree Glass, RN                      RN   Michelle Bledsoe Kristen, RN                     RN   kg                                                   

                                                                                                  

**************************************************************************************************

## 2021-08-11 NOTE — RAD REPORT
EXAM DESCRIPTION:  CT - Head Brain Wo Cont - 8/11/2021 9:41 pm

 

CLINICAL HISTORY:  Dizziness

 

COMPARISON:  April 2021

 

TECHNIQUE:  Computed axial tomography of the head was obtained. IV contrast was not requested.

 

All CT scans are performed using dose optimization technique as appropriate and may include automated
 exposure control or mA/KV adjustment according to patient size.

 

FINDINGS:  An intracranial  bleed is not seen . Mild low-density areas deep and subcortical white mat
ter may be ischemic changes secondary small vessel disease

 

The ventricles are normal in caliber.

 

No extra-axial fluid collection is noted.

 

Postsurgical changes of a left craniotomy.

 

Fluid within the sinuses/ mastoids is not seen. Mild chronic ethmoid sinusitis

 

 

IMPRESSION:  No acute intracranial abnormality is seen. If patient's symptoms persist  MRI of the bra
in would be recommended.

## 2021-08-11 NOTE — EDPHYS
Physician Documentation                                                                           

 [facility]                                                                 

Name: Michael Cuellar                                                                                 

Age: 55 yrs                                                                                       

Sex: Male                                                                                         

: 1965                                                                                   

MRN: [id number]                                                                                   

Arrival Date: 2021                                                                          

Time: 20:02                                                                                       

Account#: [id number]                                                                            

Bed 5                                                                                             

Private MD:                                                                                       

ED Physician Willis Harper                                                                         

HPI:                                                                                              

                                                                                             

22:30 This 55 yrs old  Male presents to ER via Ambulatory with complaints of          jr8 

      Numbness, Covid+.                                                                           

22:30 Onset: The symptoms/episode began/occurred today. Modifying factors: The patient        jr8 

      symptoms are alleviated by nothing, the patient symptoms are aggravated by nothing. The     

      patient has not experienced similar symptoms in the past. The patient has not recently      

      seen a physician. Patient stated that he was recently diagnosed with Covid today.           

      Stated that he started feeling, shaky and numb. Wanted to make sure he is okay as last      

      time he had numbness it was a result of subdural hemorrhage in his head..                   

                                                                                                  

Historical:                                                                                       

- Allergies:                                                                                      

21:22 No Known Allergies;                                                                     kg  

- Home Meds:                                                                                      

21:22 lisinopril 20 mg Oral tab 1 tab once daily [Active];                                    kg  

- PMHx:                                                                                           

21:22 Hypertension; Brain bleed-craniotomy;                                                   kg  

- PSHx:                                                                                           

21:22 Craniotomy;                                                                             kg  

                                                                                                  

- Immunization history:: Adult Immunizations up to date, Client reports receiving the             

  2nd dose of the Covid vaccine, Date received: 2021 Pfizer Client reports               

  receiving the 1st dose of the Covid vaccine, 2021 Pfizer.                               

- Social history:: Smoking status: Patient/guardian denies using tobacco, but has a               

  distant history of tobacco abuse, Patient uses alcohol, weekly.                                 

                                                                                                  

                                                                                                  

ROS:                                                                                              

22:30 Eyes: Negative for injury, pain, redness, and discharge, ENT: Negative for injury,      jr8 

      pain, and discharge, Neck: Negative for injury, pain, and swelling, Cardiovascular:         

      Negative for chest pain, palpitations, and edema, Respiratory: Negative for shortness       

      of breath, cough, wheezing, and pleuritic chest pain, Abdomen/GI: Negative for              

      abdominal pain, nausea, vomiting, diarrhea, and constipation, Back: Negative for injury     

      and pain, MS/Extremity: Negative for injury and deformity, Skin: Negative for injury,       

      rash, and discoloration.                                                                    

22:30 Neuro: Positive for numbness.                                                               

                                                                                                  

Exam:                                                                                             

22:30 Constitutional:  This is a well developed, well nourished patient who is awake, alert,  jr8 

      and in no acute distress. Cardiovascular:  Regular rate and rhythm with a normal S1 and     

      S2.  No gallops, murmurs, or rubs.  Normal PMI, no JVD.  No pulse deficits.                 

      Respiratory:  Lungs have equal breath sounds bilaterally, clear to auscultation and         

      percussion.  No rales, rhonchi or wheezes noted.  No increased work of breathing, no        

      retractions or nasal flaring. Abdomen/GI:  Soft, non-tender, with normal bowel sounds.      

      No distension or tympany.  No guarding or rebound.  No evidence of tenderness               

      throughout. Back:  No spinal tenderness.  No costovertebral tenderness.  Full range of      

      motion. Skin:  Warm, dry with normal turgor.  Normal color with no rashes, no lesions,      

      and no evidence of cellulitis. MS/ Extremity:  Pulses equal, no cyanosis.                   

      Neurovascular intact.  Full, normal range of motion. Neuro:  Awake and alert, GCS 15,       

      oriented to person, place, time, and situation.  Cranial nerves II-XII grossly intact.      

      Motor strength 5/5 in all extremities.  Sensory grossly intact.  Cerebellar exam            

      normal.  Normal gait.                                                                       

                                                                                                  

Vital Signs:                                                                                      

21:18  / 95; Pulse 63; Resp 20; Temp 98.1(O); Pulse Ox 100% on R/A; Weight 99.79 kg     kg  

      (R); Height 5 ft. 8 in. (172.72 cm); Pain 3/10;                                             

21:18 Body Mass Index 33.45 (99.79 kg, 172.72 cm)                                             kg  

                                                                                                  

MDM:                                                                                              

22:01 Patient medically screened.                                                             [facility], [place] 

22:02 Data reviewed: vital signs, nurses notes, radiologic studies, CT scan, plain films.     [facility], [place] 

      Data interpreted: Pulse oximetry: on room air is 100 %. Interpretation: normal.             

      Counseling: I had a detailed discussion with the patient and/or guardian regarding: the     

      historical points, exam findings, and any diagnostic results supporting the                 

      discharge/admit diagnosis, radiology results, the need for outpatient follow up, a          

      family practitioner, to return to the emergency department if symptoms worsen or            

      persist or if there are any questions or concerns that arise at home.                       

                                                                                                  

                                                                                             

20:04 Order name: XRAY Chest Pa And Lat (2 Views); Complete Time: 21:31                       rn  

                                                                                             

21:25 Order name: CT Head Brain wo Cont; Complete Time: 22:02                                 kg  

                                                                                                  

Administered Medications:                                                                         

No medications were administered                                                                  

                                                                                                  

                                                                                                  

Disposition:                                                                                      

23:36 Co-signature as Attending Physician, Willis Harper MD I agree with the assessment and     rn  

      plan of care. Attestation: The patient's history, exam findings, diagnostics, and a         

      summary of any interventions or procedures was reviewed in detail with Anastacio MCLEAN.      

                                                                                                  

Disposition Summary:                                                                              

21 22:22                                                                                    

Discharge Ordered                                                                                 

      Location: Home                                                                          [facility], [place] 

      Problem: new                                                                            jr8 

      Symptoms: have improved                                                                 jr8 

      Condition: Stable                                                                       jr8 

      Diagnosis                                                                                   

        - Paresthesia of skin                                                                 jr8 

      Followup:                                                                               jr8 

        - With: Private Physician                                                                  

        - When: 2 - 3 days                                                                         

        - Reason: Recheck today's complaints, Continuance of care, Re-evaluation by your           

      physician                                                                                   

      Discharge Instructions:                                                                     

        - Discharge Summary Sheet                                                             jr8 

        - Paresthesia                                                                         jr8 

      Forms:                                                                                      

        - Medication Reconciliation Form                                                      jr8 

        - Thank You Letter                                                                    jr8 

        - Antibiotic Education                                                                jr8 

        - Prescription Opioid Use                                                             jr8 

Signatures:                                                                                       

Dispatcher [employer]                           EDMS                                                 

Willis Harper MD MD rn Roszak, Josh, PA PA   jr8                                                  

Cecilia Sanchez, RN                     RN   kg                                                   

                                                                                                  

**************************************************************************************************

## 2022-11-07 ENCOUNTER — HOSPITAL ENCOUNTER (EMERGENCY)
Dept: HOSPITAL 97 - ER | Age: 57
Discharge: HOME | End: 2022-11-07
Payer: COMMERCIAL

## 2022-11-07 VITALS — DIASTOLIC BLOOD PRESSURE: 84 MMHG | SYSTOLIC BLOOD PRESSURE: 125 MMHG | OXYGEN SATURATION: 98 %

## 2022-11-07 VITALS — TEMPERATURE: 98.4 F

## 2022-11-07 DIAGNOSIS — I10: ICD-10-CM

## 2022-11-07 DIAGNOSIS — U07.1: Primary | ICD-10-CM

## 2022-11-07 PROCEDURE — 99283 EMERGENCY DEPT VISIT LOW MDM: CPT

## 2022-11-07 PROCEDURE — 87804 INFLUENZA ASSAY W/OPTIC: CPT

## 2022-11-07 NOTE — ER
Nurse's Notes                                                                                     

 [facility]                                                                 

Name: Michael Cuellar                                                                                 

Age: 57 yrs                                                                                       

Sex: Male                                                                                         

: 1965                                                                                   

MRN: [id number]                                                                                   

Arrival Date: 2022                                                                          

Time: 02:59                                                                                       

Account#: [id number]                                                                            

Bed 20                                                                                            

Private MD:                                                                                       

Diagnosis: SARS-associated coronavirus as the cause of diseases classified elsewhere;Viral        

  infection, unspecified                                                                          

                                                                                                  

Presentation:                                                                                     

                                                                                             

03:19 Chief complaint: Patient states: I have been sick for 3 or 4 days. Recently my wife     radha 

      took a covid test and it was positive. I woke up this morning with diarrhea and             

      vomiting so i came in. Coronavirus screen: Vaccine status: Patient reports receiving        

      the 2nd dose of the covid vaccine. Ebola Screen: No symptoms or risks identified at         

      this time. Initial Sepsis Screen: Does the patient meet any 2 criteria? No. Patient's       

      initial sepsis screen is negative. Does the patient have a suspected source of              

      infection? No. Patient's initial sepsis screen is negative. Risk Assessment: Do you         

      want to hurt yourself or someone else? Patient reports no desire to harm self or            

      others. Onset of symptoms was 2022.                                            

03:19 Method Of Arrival: Ambulatory                                                           kd3 

03:19 Acuity: LAYLA 4                                                                           kd3 

                                                                                                  

Triage Assessment:                                                                                

03:20 General: Appears ill, Behavior is calm, cooperative. Pain: Complains of pain in         kd3 

      headache. Neuro: Level of Consciousness is awake, alert, obeys commands, Oriented to        

      person, place, time, situation. Cardiovascular: Patient's skin is warm and dry.             

      Respiratory: Airway is patent Trachea midline Respiratory effort is even, unlabored,        

      Respiratory pattern is regular, symmetrical.                                                

                                                                                                  

Historical:                                                                                       

- Home Meds:                                                                                      

03:20 lisinopril 20 mg Oral tab 1 tab once daily [Active];                                    kd3 

- PMHx:                                                                                           

03:20 Brain bleed-craniotomy; Hypertension;                                                   kd3 

- PSHx:                                                                                           

03:20 Craniotomy;                                                                             kd3 

                                                                                                  

- Immunization history:: Adult Immunizations up to date.                                          

- Social history:: Smoking status: unknown.                                                       

                                                                                                  

                                                                                                  

Screenin:21 Abuse screen: Denies threats or abuse. Denies injuries from another. Nutritional        kd3 

      screening: No deficits noted. Tuberculosis screening: No symptoms or risk factors           

      identified. Fall Risk None identified.                                                      

                                                                                                  

Assessment:                                                                                       

03:34 General: Appears ill, Behavior is calm, cooperative. Neuro: Level of Consciousness is   kd3 

      awake, alert, obeys commands, Oriented to person, place, time, situation.                   

      Cardiovascular: Patient's skin is warm and dry. Respiratory: Airway is patent Trachea       

      midline Respiratory effort is even, unlabored, Respiratory pattern is regular,              

      symmetrical.                                                                                

06:31 Reassessment: No changes from previously documented assessment. Patient and/or family   kd3 

      updated on plan of care and expected duration. Pain level reassessed. Patient is alert,     

      oriented x 3, equal unlabored respirations, skin warm/dry/pink.                             

                                                                                                  

Vital Signs:                                                                                      

03:10  / 91; Pulse 53; Resp 18; Temp 98.4(O); Pulse Ox 95% on R/A; Weight 99.79 kg;     mm9 

      Height 5 ft. 8 in. (172.72 cm);                                                             

03:34  / 87; Pulse 54; Resp 16; Pulse Ox 95% on R/A;                                    kd3 

06:31  / 84; Pulse 62; Resp 17; Pulse Ox 98% on R/A;                                    kd3 

03:10 Body Mass Index 33.45 (99.79 kg, 172.72 cm)                                             mm9 

                                                                                                  

ED Course:                                                                                        

02:59 Patient arrived in ED.                                                                  ja2 

03:01 Ashok Junior MD is Attending Physician.                                              kdr 

03:11 Allergy band placed. Placed in gown. Bed in low position. Call light in reach. Side     mm9 

      rails up X 1. Pulse ox on. NIBP on.                                                         

03:12 COVID swab sent to lab. Flu and/or RSV swab sent to lab.                                mm9 

03:12 Flu Sent.                                                                               mm9 

03:12 COVID-19 SARS RT PCR (Document "Date of Onset" if Symptomatic) Sent.                    mm9 

03:19 Guillermina Ribeiro, RAMAKRISHNA is Primary Nurse.                                                    kd3 

03:20 Triage completed.                                                                       kd3 

03:20 Arm band placed on right wrist.                                                         kd3 

03:34 No provider procedures requiring assistance completed.                                  kd3 

06:39 Patient did not have IV access during this emergency room visit.                        kd3 

                                                                                                  

Administered Medications:                                                                         

No medications were administered                                                                  

                                                                                                  

                                                                                                  

Medication:                                                                                       

03:21 VIS not applicable for this client.                                                     kd3 

                                                                                                  

Outcome:                                                                                          

06:12 Discharge ordered by MD.                                                                kdr 

06:38 Discharged to home ambulatory.                                                          kd3 

06:38 Condition: stable                                                                           

06:38 Discharge instructions given to patient, family, Instructed on discharge instructions,      

      follow up and referral plans. Demonstrated understanding of instructions, follow-up         

      care.                                                                                       

06:39 Patient left the ED.                                                                    kd3 

                                                                                                  

Signatures:                                                                                       

Ashok Junior MD MD   [facility]                                                  

Cindy Schreiber Kyli, RN                      RN   sharee3                                                  

Linda Johns                              mm9                                                  

                                                                                                  

**************************************************************************************************

## 2022-11-07 NOTE — XMS REPORT
Continuity of Care Document

                           Created on:2022



Patient:KASANDRA CUELLAR

Sex:Male

:1965

External Reference #:[id number]





Demographics







                          Address                   [address] [place] [address]

 

                          Home Phone                [phone]

 

                          Mobile Phone              [phone] )

 

                          Email Address             DECLINED

 

                          Preferred Language        English

 

                          Marital Status            Unknown

 

                          Congregational Affiliation     Unknown

 

                          Race                      Unknown

 

                          Additional Race(s)        Unavailable



                                                    White

 

                          Ethnic Group              Unknown









Author







                          Organization              [facility]

t

 

                          Address                   [address]



                                                    [place] [address]

 

                          Phone                     [phone]









Support







                Name            Relationship    Address         Phone

 

                LAUREN CUELLAR               [address]  [phone]



                                                [place] [address] 

 

                Lauren Martinez E               Unavailable     [phone]

[phone]

 

                Lauren Cuellar  Spouse          Unavailable     [phone]









Care Team Providers







                    Name                Role                Phone

 

                    No MD, Pcp [facility]  Primary Care Physician Unavailable

 

                    DEVON CORONA Attending Clinician Unavailable

 

                    JIN ALVARADO       Attending Clinician Unavailable

 

                    Jose A King MD Attending Clinician [phone]

 

                    RICHARD BROWN Attending Clinician Unavailable

 

                    RICHARD BROWN Attending Clinician Unavailable

 

                    Cher NPIan Attending Clinician [phone] 

 

                    [address] [place] [address] Ct Room Attending Clinician Unavailable

 

                    IAN SANTILLAN Attending Clinician Unavailable

 

                    [facility] Attending Clinician [phone]

[phone]

 

                    Devon Corona MD Attending Clinician [phone]

[phone]

 

                    Valerie Caldera MD Attending Clinician [phone]

[phone]

 

                    Jin Alvarado MD    Attending Clinician [phone]

 

                    Eli CROWLEY, Abner Taylor     Attending Clinician [phone]

 

                    Jose A King MD Attending Clinician [phone]

 

                    Derek Clark MD Attending Clinician [phone]

 

                    Candelario Kolb CRNA Attending Clinician [phone]

 

                    TARI BISHOP     Attending Clinician Unavailable

 

                    Eliazar Evans MD Attending Clinician [phone]

 

                    ELIAZAR EVANS   Attending Clinician Unavailable

 

                    Doctor Unassigned, No Name Attending Clinician Unavailable

 

                    Mone Feldman MD A Attending Clinician [phone]

 

                    MONE FELDMAN A Attending Clinician Unavailable

 

                    DEVON CORONA Admitting Clinician Unavailable

 

                    JIN ALVARADO       Admitting Clinician Unavailable









Payers







           Payer Name Policy Type Policy Number Effective Date Expiration Date VIANNEY FARIAS            [id number] 2021            



                                            00:00:00              

 

           [facility]            [id number]  2018            



           PPO/POS                          00:00:00              







Problems







       Condition Condition Condition Status Onset  Resolution Last   Treating Co

mments 

Source



       Name   Details Category        Date   Date   Treatment Clinician        



                                                 Date                 

 

       Weakness Weakness Disease Active                              [facility]

[facility]



                                   [facility]



                                   [facility]                             [facility]



                                   00                                 [facility]

 

       Midline Midline Disease Active                              [facility]



       shift of shift of               [facility]                               [facility]



       brain  brain                00:00:                             Medical



                                   00                                 Center

 

       HTN    HTN    Disease Active                              [facility]



       (hypertens (hypertens               4-07                               [facility]

[facility]



       ion)   ion)                 00:00:                             Medical



                                   00                                 Center

 

       Subdural Subdural Disease Active                       [facility]

[facility]



       hematoma hematoma               3-29                        [facility]

es



                                   00:00:                      g of this Medical



                                   00                          note   Center



                                                               might be 



                                                               different 



                                                               from the 



                                                               original. 



                                                               Added  



                                                               automatic 



                                                               ally from 



                                                               request 



                                                               for    



                                                               surgery 



                                                               [id number] 

 

       Dyslipidem Dyslipidem Disease Active 2018                             U

nivers



       ia (high ia (high               0-16                               ity of



       LDL; low LDL; low               00:00:                             [place]



       HDL)   HDL)                 00                                 Medical



                                                                      Branch

 

       Obesity Obesity Disease Active 2018                             [facility]



       (BMI   (BMI                 0-16                               ity of



       30-39.9) 30-39.9)               00:00:                             [address]



                                                                      Branch







Allergies, Adverse Reactions, Alerts







       Allergy Allergy Status Severity Reaction(s) Onset  Inactive Treating Comm

ents 

Source



       Name   Type                        Date   Date   Clinician        

 

       NO KNOWN Drug   Active                                           [facility]



       ALLERGIE Class                                                   ity of



       S                                                              [facility]

 

       NO KNOWN Allergy Active                                           [facility]



       ALLERGIE                                                         [facility]







Social History







           Social Habit Start Date Stop Date  Quantity   Comments   Source

 

           Exposure to                       Not sure              [facility]-CoV-2                                             [place] [facility]



           (event)                                                Branch

 

           History SDOH                                             [facility]



           Alcohol Comment                                             Medical C

enter

 

           Alcohol intake 2021 Ex-drinker            [facility]

es



                      00:00:00   00:00:00   (finding)             Medical Center

 

           Tobacco use and 2021 Never used            [facility]

kes



           exposure   00:00:00   00:00:00                         Medical Center

 

           History [facility] 2021 3                     [facility]



           Alcohol Frequency 00:00:00   00:00:00                         Medical

 Center

 

           History [facility] 2021 1                     [facility]



           Alcohol Std 00:00:00   00:00:00                         Medical Cente

r



           Drinks                                                 

 

           History [facility] 2021 1                     [facility]



           Alcohol Binge 00:00:00   00:00:00                         [facility]

ter

 

           Sex Assigned At [birth year]                       [facility]

[facility]



           Birth      00:00:00   00:00:00                         Medical Center









                Smoking Status  Start Date      Stop Date       Source

 

                Former smoker   2021-04-15 00:00:00 2021-04-15 00:00:00 [facility]

 

                Never smoker                                    [facility]







Medications







       Ordered Filled Start  Stop   Current Ordering Indication Dosage Frequency

 Signature

                    Comments            Components          Source



     Medication Medication Date Date Medication? Clinician                (SIG) 

          



     Name Name                                                   

 

     TAKE 5 ML      -0      No                                      



     BY MOUTH      8-19                                              



     EVERY 6      00:00:                                              



     HOURS AS      00                                                



     NEEDED FOR                                                        



     COUGH                                                        

 

     TAKE 5 ML      -0      No                                      



     BY MOUTH      8-19                                              



     EVERY 6      00:00:                                              



     HOURS AS      00                                                



     NEEDED FOR                                                        



     COUGH                                                        

 

     TAKE 5 ML      -0      No                                      



     BY MOUTH      8-19                                              



     EVERY 6      00:00:                                              



     HOURS AS      00                                                



     NEEDED FOR                                                        



     COUGH                                                        

 

     TAKE 5 ML      -0      No                                      



     BY MOUTH      8-19                                              



     EVERY 6      00:00:                                              



     HOURS AS      00                                                



     NEEDED FOR                                                        



     COUGH                                                        

 

     HYDROcodone      2021-              1{tbl}      Take 1           C

[facility]



     -acetaminop      4-03 04-09                          tablet by           [facility] last



     hen (NORCO      00:00: 23:59                          mouth           Medic

al



     5-325)      00   :00                           every 8           Center



     5-325 mg                                         (eight)           



     per tablet                                         hours as           



                                                  needed for           



                                                  Pain for           



                                                  up to 6           



                                                  days. Max           



                                                  Daily           



                                                  Amount: 3           



                                                  tablets           

 

     ibuprofen      2021- No        headache 800mg      Take 800         

  [facility]



     (ADVIL,MOTR      3-22 04-03           disorder           mg by           [facility] last



     IN) 800 MG      00:00: 00:00                          mouth           Medic

al



     tablet      00   :00                           every 6           Center



                                                  (six)           



                                                  hours as           



                                                  needed for           



                                                  Pain For           



                                                  headaches,           



                                                  took for           



                                                  two days           



                                                  every 6           



                                                  hours           



                                                  only. Per           



                                                  NP Haley Neri .           

 

     lisinopriL      -      Yes       [id number] 20mg      Take 1           U

nivers



     20 mg      1-09                               tablet by           ity of



     tablet      00:00:                               mouth           [place]



               00                                 daily.           [facility]

 

     lisinopriL      -      Yes       [id number] 20mg      Take 1           U

nivers



     20 mg      1-09                               tablet by           ity of



     tablet      00:00:                               mouth           [place]



               00                                 daily.           [facility]

 

     lisinopriL      -      Yes       [id number] 20mg      Take 1           U

nivers



     20 mg      1-09                               tablet by           ity of



     tablet      00:00:                               mouth           [place]



               00                                 daily.           [facility]

 

     lisinopriL      -      Yes       [id number] 20mg      Take 1           U

nivers



     20 mg      1-09                               tablet by           ity of



     tablet      00:00:                               mouth           [place]



               00                                 daily.           [facility]

 

     lisinopriL      -      Yes       [id number] 20mg      Take 1           U

nivers



     20 mg      1-09                               tablet by           ity of



     tablet      00:00:                               mouth           [place]



               00                                 daily.           [facility]

 

     lisinopriL      2020      Yes       [id number] 20mg      Take 1           U

nivers



     20 mg      1-09                               tablet by           ity of



     tablet      00:00:                               mouth           [place]



               00                                 daily.           [facility]

 

     lisinopriL      2020      Yes            20mg QD   Take 20 mg           C

[facility]



     (PRINIVIL,Z      1-09                               by mouth           [facility]

s



     ESTRIL) 20      00:00:                               daily.           Medic

al



     MG tablet      00                                                [place]

 

     lisinopriL      2020      Yes            20mg QD   Take 20 mg           C

[facility]



     (PRINIVIL,Z      1-09                               by mouth           [facility]

s



     ESTRIL) 20      00:00:                               daily.           Medic

al



     MG tablet      00                                                [place]

 

     lisinopriL      2020      Yes            20mg QD   Take 20 mg           C

[facility]



     (PRINIVIL,Z      1-09                               by mouth           [facility]

s



     ESTRIL) 20      00:00:                               daily.           Medic

al



     MG tablet      00                                                [place]

 

     lisinopriL      2020      Yes            20mg QD   Take 20 mg           C

[facility]



     (PRINIVIL,Z      1-09                               by mouth           [facility]

s



     ESTRIL) 20      00:00:                               daily.           Medic

al



     MG tablet      00                                                [place]

 

     lisinopriL      2020      Yes            20mg QD   Take 20 mg           C

[facility]



     (PRINIVIL,Z      1-09                               by mouth           [facility]

s



     ESTRIL) 20      00:00:                               daily.           Medic

al



     MG tablet      00                                                [place]

 

     lisinopriL      2020      Yes            20mg QD   Take 20 mg           C

[facility]



     (PRINIVIL,Z      1-09                               by mouth           [facility]

s



     ESTRIL) 20      00:00:                               daily.           Medic

al



     MG tablet      00                                                [place]

 

     lisinopriL      2020      Yes            20mg QD   Take 20 mg           C

[facility]



     (PRINIVIL,Z      1-09                               by mouth           [facility]

s



     ESTRIL) 20      00:00:                               daily.           Medic

al



     MG tablet      00                                                [place]

 

     lisinopriL      2020      Yes            20mg QD   Take 20 mg           C

[facility]



     (PRINIVIL,Z      1-09                               by mouth           [facility]

s



     ESTRIL) 20      00:00:                               daily.           Medic

al



     MG tablet      00                                                [place]

 

     lisinopriL      2020      Yes            20mg QD   Take 20 mg           C

[facility]



     (PRINIVIL,Z      1-09                               by mouth           [facility]

s



     ESTRIL) 20      00:00:                               daily.           Medic

al



     MG tablet      00                                                [place]

 

     lisinopril      2020      Yes            20mg      Take 20 mg           [facility]

[facility]



     (PRINIVIL,      1-09                               by mouth           Colle

ge



     ZESTRIL) 20      00:00:                               daily.           of



     MG tablet      00                                                Medicin



                                                                 e

 

     lisinopril      2020      Yes            20mg      Take 20 mg           [facility]

[facility]



     (PRINIVIL,      1-09                               by mouth           Colle

ge



     ZESTRIL) 20      00:00:                               daily.           of



     MG tablet      00                                                Med[facility]



                                                                 e

 

     lisinopriL      2020      Yes            20mg QD   Take 20 mg           C

[facility]



     (PRINIVIL,Z      1-09                               by mouth           [facility]

s



     ESTRIL) 20      00:00:                               daily.           Medic

al



     MG tablet      00                                                [place]

 

     LISINOPRIL      2020      Yes       [id number]           TAKE ONE          

 [facility]



     20 mg      0-02                               TABLET BY           ity of



     tablet      00:00:                               MOUTH           [place]



               00                                 DAILY           Medical



                                                                 Branch

 

     LISINOPRIL      -      Yes       [id number]           TAKE ONE          

 Univers



     20 mg      0-02                               TABLET BY           ity of



     tablet      00:00:                               MOUTH           [place]



               00                                 DAILY           Medical



                                                                 Branch

 

     LISINOPRIL      - 2020- No        [id number]           TAKE ONE         

  [facility]



     20 mg      0-02 11-09                          TABLET BY           ity of



     tablet      00:00: 00:00                          MOUTH           [place]



               00   :00                           DAILY           Medical



                                                                 Branch

 

     LISINOPRIL      - 2020- No        [id number]           TAKE ONE         

  [facility]



     20 mg      0-02 11-09                          TABLET BY           ity of



     tablet      00:00: 00:00                          MOUTH           [place]



               00   :00                           DAILY           Medical



                                                                 Branch

 

     LISINOPRIL      2020 2020- No        [id number]           TAKE ONE         

  [facility]



     20 mg      0-02 11-09                          TABLET BY           ity of



     tablet      00:00: 00:00                          MOUTH           [place]



               00   :00                           DAILY           Medical



                                                                 Branch

 

     LISINOPRIL      - 2020- No        [id number]           TAKE ONE         

  [facility]



     20 mg      0-02 11-09                          TABLET BY           ity of



     tablet      00:00: 00:00                          MOUTH           [place]



               00   :00                           DAILY           Medical



                                                                 Branch

 

     lisinopril      -0 2020- No        [id number] 20mg      Take 1           

Univers



     20 mg      9-19 10-02                          tablet by           ity of



     tablet      00:00: 00:00                          mouth           [place]



               00   :00                           daily.           Medical



                                                                 Branch

 

     LISINOPRIL      2019-0      Yes       [id number]           TAKE ONE          

 Univers



     20 mg      7-16                               TABLET BY           ity of



     tablet      00:00:                               MOUTH           [place]



               00                                 DAILY           Medical



                                                                 [place]







Immunizations







           Ordered    Filled Immunization Date       Status     Comments   [facility]

e



           Immunization Name Name                                        

 

           Influenza Virus            2020-10-06 Completed             Univers[facility]

y of



           Vaccine               00:00:00                         [facility]

 

           Influenza Virus            2020-10-06 Completed             Univers[facility]

y of



           Vaccine               00:00:00                         [facility]

 

           Influenza Virus            2020-10-06 Completed             Univers[facility]

y of



           Vaccine               00:00:00                         [facility] [facility]

 

           Influenza Virus            2020-10-06 Completed             Universit

y of



           Vaccine               00:00:00                         [facility]

 

           Influenza Virus            2020-10-06 Completed             Universit

y of



           Vaccine               00:00:00                         [facility]

 

           Influenza Virus            2020-10-06 Completed             Universit

y of



           Vaccine               00:00:00                         [facility]

 

           Influenza Virus            2018-10-10 Completed             Universit

y of



           Vaccine Quad IM 3+            00:00:00                         [facility]

 

           Influenza Virus            2018-10-10 Completed             Universit

y of



           Vaccine Quad IM 3+            00:00:00                         [facility]

 

           Influenza Virus            2018-10-10 Completed             Universit

y of



           Vaccine Quad IM 3+            00:00:00                         [facility]

 

           Influenza Virus            2018-10-10 Completed             Universit

y of



           Vaccine Quad IM 3+            00:00:00                         [facility]

 

           Influenza Virus            2018-10-10 Completed             Universit

y of



           Vaccine Quad IM 3+            00:00:00                         [facility]

 

           Influenza Virus            2018-10-10 Completed             Universit

y of



           Vaccine Quad IM 3+            00:00:00                         [facility]

 

           Influenza Virus            2018-10-10 Completed             Universit

y of



           Vaccine Quad IM 3+            00:00:00                         [facility]

 

           Influenza Virus            2018-10-10 Completed             Universit

y of



           Vaccine Quad IM 3+            00:00:00                         [facility]

 

           Influenza Virus            2018-10-10 Completed             Universit

y of



           Vaccine Quad IM 3+            00:00:00                         [facility]







Vital Signs







             Vital Name   Observation Time Observation Value Comments     Source

 

             HEIGHT       2021 18:00:00 172.7 cm                  

 

             WEIGHT       2021 18:00:00 105.688 kg                

 

             HEIGHT       2021 17:31:00 172.7 cm                  

 

             WEIGHT       2021 17:31:00 105.688 kg                

 

             HEIGHT       2021 23:10:00 172.7 cm                  

 

             WEIGHT       2021 23:10:00 107.14 kg                 

 

             WEIGHT       2021 03:00:00 107.4 kg                  

 

             WEIGHT       2021 07:00:00 110.7 kg                  

 

             WEIGHT       2021 08:00:00 110 kg                    

 

             WEIGHT       2021 06:00:00 111.1 kg                  

 

             WEIGHT       2021 06:00:00 110 kg                    

 

             HEIGHT       2021 02:30:00 172.7 cm                  

 

             WEIGHT       2021 02:30:00 110.5 kg                  

 

             Systolic blood 2021 13:15:00 108 mm[Hg]                [facility]



             pressure                                            Medicine

 

             Diastolic blood 2021 13:15:00 74 mm[Hg]                 [facility]



             pressure                                            Medicine

 

             Heart rate   2021 13:15:00 57 /min                   [facility]

 

             Body temperature 2021 13:15:00 36.28 Judith                 [facility]

 

             Respiratory rate 2021 13:15:00 16 /min                   [facility]

 

             Body height  2021 13:15:00 172.7 cm                  [facility]

 

             Body weight  2021 13:15:00 108.41 kg                 [facility]

 

             BMI          2021 13:15:00 36.34 kg/m2               [facility]

 

             Oxygen saturation in 2021 13:15:00 98 /min                   

[facility]



             Arterial blood by                                        Medicine



             Pulse oximetry                                        

 

             Systolic blood 2021 13:15:00 108 mm[Hg]                [facility]

 [employer] of



             pressure                                            Medicine

 

             Diastolic blood 2021 13:15:00 74 mm[Hg]                 [facility]                                            Medicine

 

             Heart rate   2021 13:15:00 57 /min                   [facility]



                                                                 [facility]

 

             Body temperature 2021 13:15:00 36.28 Judith                 [facility]

 

             Respiratory rate 2021 13:15:00 16 /min                   [facility]

 

             Body height  2021 13:15:00 172.7 cm                  [facility]

 

             Body weight  2021 13:15:00 108.41 kg                 [facility]

 

             BMI          2021 13:15:00 36.34 kg/m2               [facility]

 

             Oxygen saturation in 2021 13:15:00 98 /min                   

[facility] of



             Arterial blood by                                        Medicine



             Pulse oximetry                                        

 

             Diastolic blood 2021-04-15 17:11:00 81 mm[Hg]                 [facility]                                            Medicine

 

             Heart rate   2021-04-15 17:11:00 71 /min                   [facility]



                                                                 [facility]

 

             Body temperature 2021-04-15 17:11:00 36.33 Judith                 [facility]

 

             Respiratory rate 2021-04-15 17:11:00 16 /min                   [facility]

 

             Body height  2021-04-15 17:11:00 172.7 cm                  [facility]

 

             Body weight  2021-04-15 17:11:00 106.595 kg                [facility]

 

             BMI          2021-04-15 17:11:00 35.73 kg/m2               [facility]

 

             Oxygen saturation in 2021-04-15 17:11:00 99 /min                   

[facility] of



             Arterial blood by                                        Medicine



             Pulse oximetry                                        

 

             Systolic blood 2021-04-15 17:11:00 113 mm[Hg]                [facility]                                            Medicine

 

             Diastolic blood 2021-04-15 17:11:00 81 mm[Hg]                 [facility]                                            Medicine

 

             Heart rate   2021-04-15 17:11:00 71 /min                   [facility]



                                                                 [facility]

 

             Body temperature 2021-04-15 17:11:00 36.33 Judith                 [facility]

 

             Respiratory rate 2021-04-15 17:11:00 16 /min                   [facility]

 

             Body height  2021-04-15 17:11:00 172.7 cm                  [facility]

 

             Body weight  2021-04-15 17:11:00 106.595 kg                [facility]

 

             BMI          2021-04-15 17:11:00 35.73 kg/m2               [facility]

 

             Oxygen saturation in 2021-04-15 17:11:00 99 /min                   

[facility] blood by                                        Medicine



             Pulse oximetry                                        

 

             Systolic blood 2021-04-15 17:11:00 113 mm[Hg]                [facility]                                            Medicine

 

             HEIGHT       2021 18:00:00 172.7 cm                  

 

             WEIGHT       2021 18:00:00 105.688 kg                

 

             HEIGHT       2021 17:31:00 172.7 cm                  

 

             WEIGHT       2021 17:31:00 105.688 kg                

 

             HEIGHT       2021 23:10:00 172.7 cm                  

 

             WEIGHT       2021 23:10:00 107.14 kg                 

 

             WEIGHT       2021 03:00:00 107.4 kg                  

 

             WEIGHT       2021 07:00:00 110.7 kg                  

 

             WEIGHT       2021 08:00:00 110 kg                    

 

             WEIGHT       2021 06:00:00 111.1 kg                  

 

             WEIGHT       2021 06:00:00 110 kg                    

 

             HEIGHT       2021 02:30:00 172.7 cm                  

 

             WEIGHT       2021 02:30:00 110.5 kg                  

 

             Systolic blood 2020 17:42:00 109 mm[Hg]                [facility]

[facility]ity of



             pressure                                            [facility]

 

             Diastolic blood 2020 17:42:00 68 mm[Hg]                 Unive

rsity of



             pressure                                            [facility]

 

             Heart rate   2020 17:42:00 53 /min                   [facility]

 

             Body temperature 2020 17:42:00 36.44 Judith                 [facility]



                                                                 [facility]

 

             Body height  2020 17:42:00 172.7 cm                  Universi

[facility]

 

             Body weight  2020 17:42:00 112.492 kg                [facility]

 

             BMI          2020 17:42:00 37.71 kg/m2               Universi

[facility]

 

             Systolic blood 2020 17:42:00 109 mm[Hg]                Univer

sity of



             pressure                                            [facility]

 

             Diastolic blood 2020 17:42:00 68 mm[Hg]                 Unive

rsity of



             pressure                                            [facility]

 

             Heart rate   2020 17:42:00 53 /min                   Universi

ty [facility]

 

             Body temperature 2020 17:42:00 36.44 Judith                 [facility]

 

             Body height  2020 17:42:00 172.7 cm                  Universi

[facility]

 

             Body weight  2020 17:42:00 112.492 kg                Universi

[facility]

 

             BMI          2020 17:42:00 37.71 kg/m2               [facility]

 

             BP Systolic  2022-10-28 16:41:00 123 mm[Hg]                

 

             BP Diastolic 2022-10-28 16:41:00 81 mm[Hg]                 

 

             Weight Measured 2022-10-28 16:41:00 226.60 pounds              

 

             Height Measured 2022-10-28 16:41:00 68.00 inches              

 

             Body Temperature 2022-10-28 16:41:00 97.30 degrees              

 

             Heart Rate   2022-10-28 16:41:00 59.00 /min                

 

             Respiratory Rate 2022-10-28 16:41:00 16.00 /min                

 

             BP Systolic  2022 08:27:00 120 mm[Hg]                

 

             BP Diastolic 2022 08:27:00 79 mm[Hg]                 

 

             Weight Measured 2022 08:27:00 226.80 pounds              

 

             Height Measured 2022 08:27:00 68.00 inches              

 

             Body Temperature 2022 08:27:00 98.20 degrees              

 

             Heart Rate   2022 08:27:00 57.00 /min                

 

             Respiratory Rate 2022 08:27:00 16.00 /min                

 

             BP Systolic  2022 08:57:00 114 mm[Hg]                

 

             BP Diastolic 2022 08:57:00 65 mm[Hg]                 

 

             Weight Measured 2022 08:57:00 230.00 pounds              

 

             Height Measured 2022 08:57:00 68.00 inches              

 

             Body Temperature 2022 08:57:00 97.60 degrees              

 

             Heart Rate   2022 08:57:00 60.00 /min                

 

             Respiratory Rate 2022 08:57:00 21.00 /min                

 

             BP Systolic  2022 16:39:00 137 mm[Hg]                

 

             BP Diastolic 2022 16:39:00 84 mm[Hg]                 

 

             Weight Measured 2022 16:39:00 225.40 pounds              

 

             Height Measured 2022 16:39:00 68.00 inches              

 

             Body Temperature 2022 16:39:00 98.10 degrees              

 

             Heart Rate   2022 16:39:00 57.00 /min                

 

             Respiratory Rate 2022 16:39:00                           

 

             Systolic blood 2021 12:00:00 123 mm[Hg]                [facility]

 

             Diastolic blood 2021 12:00:00 62 mm[Hg]                 [facility]

 

             Heart rate   2021 12:00:00 75 /min                   [facility]

 

             Body temperature 2021 12:00:00 35.89 Judith                 [facility]

 

             Respiratory rate 2021 12:00:00 20 /min                   [facility]

 

             Oxygen saturation in 2021 12:00:00 99 /min                   

[facility]



             Arterial blood by                                        Medical Ce

nter



             Pulse oximetry                                        

 

             Body height  2021 18:00:00 172.7 cm                  [facility]

 

             Body weight  2021 18:00:00 105.688 kg                [facility]

 

             BMI          2021 18:00:00 35.43 kg/m2               [facility]







Procedures







                Procedure       Date / Time     Performing Clinician Source



                                Performed                       

 

                CT BRAIN WITHOUT IV 2021 07:13:00 Cher      [facility]



                CONTRAST                        [facility]

 

                MAGNESIUM       2021 06:48:00 Gary Yee [facility]

 

                PHOSPHORUS      2021 06:48:00 Gary Yee [facility]

 

                BASIC METABOLIC PANEL (7) 2021 06:48:00 Gary Yee [facility]

 

                POCT-GLUCOSE METER 2021 06:24:00 [facility]

 

                CBC W/PLT COUNT & AUTO 2021 05:38:00 Gary Yee

[facility]

 

                POCT-GLUCOSE METER 2021 00:06:00 [facility]

 

                EEG AWAKE AND DROWSY 2021 15:20:00 Gary Yee [facility]

 

                POCT-GLUCOSE METER 2021 12:22:00 [facility]

 

                CBC W/PLT COUNT & AUTO 2021 05:01:00 Dayne Monsivais [facility]



                DIFFERENTIAL                    [facility]          [facility]

 

                MAGNESIUM       2021 05:01:00 Gary Yee [facility]

 

                PHOSPHORUS      2021 05:01:00 Gary Yee [facility]

 

                BASIC METABOLIC PANEL (7) 2021 05:01:00 Gary Yee [facility]

 

                CTA CAROTID     2021 04:20:00 Gary Yee [facility]

 

                CTA BRAIN       2021 04:20:00 Gary Yee [facility]

 

                MR BRAIN WITHOUT IV 2021 22:32:00 Gary Yee [facility]

 

                HEPATIC FUNCTION PANEL 2021 18:15:00 Gary Yee

[facility]

 

                PROTHROMBIN TIME/INR 2021 18:15:00 Gary Yee [facility]

 

                APTT            2021 18:15:00 Gary Yee [facility]

 

                CBC W/PLT COUNT & AUTO 2021 18:15:00 Loi, [facility]

 

                BASIC METABOLIC PANEL (7) 2021 18:15:00 Loi Dayne CRAIG

[facility]

 

                FIBRINOGEN      2021 18:15:00 Loi [facility]

 

                MAGNESIUM       2021 18:15:00 Loi, [facility]

 

                PHOSPHORUS      2021 18:15:00 Loi, [facility]

 

                CT BRAIN WITHOUT IV 2021 10:11:00 Abner Benitez In    [facility]

 

                BASIC METABOLIC PANEL (7) 2021 05:07:00 Gary Yee [facility]

 

                MAGNESIUM       2021 05:07:00 Gary Yee [facility]

 

                PHOSPHORUS      2021 05:07:00 Gary Yee [facility]

 

                CBC W/PLT COUNT & AUTO 2021 05:06:00 Gary Yee

[facility]

 

                BASIC METABOLIC PANEL (7) 2021 03:52:00 Gary Yee [facility]

 

                CBC W/PLT COUNT & AUTO 2021 03:52:00 Darrin Francis

[facility]

 

                MAGNESIUM       2021 03:52:00 Gary Yee [facility]

 

                PHOSPHORUS      2021 03:52:00 Gary Yee [facility]

 

                CBC W/PLT COUNT & AUTO 2021 04:12:00 Darrin Francis

[facility]

 

                BASIC METABOLIC PANEL (7) 2021 03:33:00 Gary Yee [facility]

 

                MAGNESIUM       2021 03:33:00 Gary Yee [facility]

 

                PHOSPHORUS      2021 03:33:00 Gary Yee [facility]

 

                CT BRAIN WITHOUT IV 2021 04:12:00 Darrin Francis [facility]

 

                BASIC METABOLIC PANEL (7) 2021 03:48:00 Gary Yee [facility]

 

                CBC W/PLT COUNT & AUTO 2021 03:48:00 Darrin Francis

[facility]                                    Center

 

                MAGNESIUM       2021 03:48:00 Gary Yee [facility]

 

                PHOSPHORUS      2021 03:48:00 Gary Yee [facility]

 

                CRANIECTOMY/    2021 07:33:00 Jose A King [facility]



                CRANIOTOMY,EVACUATION                                 Center



                HEMATOMA                                        

 

                CT BRAIN WITHOUT IV 2021 04:28:00 Kvng Cotton [facility]          Center

 

                BASIC METABOLIC PANEL (7) 2021 03:47:00 Gary Yee [facility]

 

                CBC W/PLT COUNT & AUTO 2021 03:47:00 Maria Luisa Schreiber [facility]

 

                MAGNESIUM       2021 03:47:00 Gary Yee [facility]

 

                PHOSPHORUS      2021 03:47:00 Gary Yee [facility]

 

                LIPID PANEL     2021 03:47:00 [place] [facility]

 

                TSH/FREE T4 IF INDICATED 2021 03:47:00 [place] [facility]

 

                TROPONIN I      2021 03:47:00 [place] [facility]

 

                ABORH, MANUAL   2021 21:51:00 Reyes, Meredith Anne [facility]

 

                SARS-COV2/RT-PCR ([facility] & 2021 21:17:00 [place] [facility]



                REF LABS)                                       [place]

 

                BASIC METABOLIC PANEL (7) 2021 21:16:00 [place] [facility]

 

                CBC W/PLT COUNT & AUTO 2021 21:16:00 Maria Luisa Schreiber [facility]



                DIFFERENTIAL                                    Center

 

                MAGNESIUM       2021 21:16:00 Roselyn Schreiberutha [facility]

 

                PHOSPHORUS      2021 21:16:00 Adboul Maria Luisa [facility]

 

                PROTHROMBIN TIME/INR 2021 21:16:00 Maria Luisa Schreiber [facility]

 

                APTT            2021 21:16:00 Abdoul Maria Luisa [facility]

 

                FIBRINOGEN      2021 21:16:[facility]

 

                TYPE AND SCREEN, 2021 21:16:00 Abdoul Maria Luisa [facility]



                AUTOMATED                                       Center

 

                ECG 12-LEAD     2021 21:15:02 Unknown, [facility]

 

                XR CHEST 1 VIEW PORTABLE 2021 20:38:00 Abdoul Maria Luisa 

[facility]



                / BEDSIDE                                       Center

 

                SCANNED LAB RESULTS 2020-11-10 06:01:00 Doctor Unassigned, No [facility] 

[place]



                                                Name            Medical Branch

 

                NO SHOW OR MISSED 2019 20:36:19 Doctor Unassigned, No Univ

ersity [facility]



                APPOINTMENT POLICY                 Name            Medical [facility]



                ACKNOWLEDGEMENT                                 







Plan of Care







             Planned Activity Planned Date Details      Comments     Source

 

             Future Scheduled 2024   Lipid panel (procedure)              

[facility]



             Test         00:00:00     [code = 82279645]              Medical Ce

nter

 

             Future Scheduled 2024   Lipid panel (procedure)              

[facility]



             Test         00:00:00     [code = [id number]]              Medical Ce

nter

 

             Future Scheduled 2024   Lipid panel (procedure)              

[facility]



             Test         00:00:00     [code = [id number]]              Medical Ce

nter

 

             Future Scheduled 2024   Lipid panel (procedure)              

[facility]



             Test         00:00:00     [code = [id number]]              Medical Ce

nter

 

             Future Scheduled 2024   Lipid panel (procedure)              

[facility]



             Test         00:00:00     [code = [id number]]              Medical Ce

nter

 

             Future Scheduled 2024   Lipid panel (procedure)              

[facility]



             Test         00:00:00     [code = [id number]]              Medical Ce

nter

 

             Future Scheduled 2024   Lipid panel (procedure)              

[facility]



             Test         00:00:00     [code = [id number]]              Medical Ce

nter

 

             Future Scheduled 2024   Lipid panel (procedure)              

[facility]



             Test         00:00:00     [code = [id number]]              Medical Ce

nter

 

             Future Scheduled 2024   Lipid panel (procedure)              

[facility]



             Test         00:00:00     [code = [id number]]              Medical Ce

nter

 

             Future Scheduled 2024   Lipid panel (procedure)              

[facility]



             Test         00:00:00     [code = [id number]]              Medical Ce

nter

 

             Future Scheduled 2022   INFLUENZA VACCINE (#1)              C

[facility]



             Test         00:00:00     [code = [id number]              Medical Ce

nter



                                       VACCINE (#1)]              

 

             Future Scheduled 2022   INFLUENZA VACCINE (#1)              C

[facility] [facility]



             Test         00:00:00     [code = INFLUENZA              Medical Ce

nter



                                       VACCINE (#1)]              

 

             Future Scheduled 2022   INFLUENZA VACCINE (#1)              C

[facility]



             Test         00:00:00     [code = INFLUENZA              Medical Ce

nter



                                       VACCINE (#1)]              

 

             Future Scheduled 2022   INFLUENZA VACCINE (#1)              C

[facility]



             Test         00:00:00     [code = INFLUENZA              Medical Ce

nter



                                       VACCINE (#1)]              

 

             Future Scheduled 2022   INFLUENZA VACCINE (#1)              C

[facility]



             Test         00:00:00     [code = INFLUENZA              Medical Ce

nter



                                       VACCINE (#1)]              

 

             Future Scheduled 2022   INFLUENZA VACCINE (#1)              C

[facility]



             Test         00:00:00     [code = INFLUENZA              Medical Ce

nter



                                       VACCINE (#1)]              

 

             Future Scheduled 2022   INFLUENZA VACCINE (#1)              C

[facility]



             Test         00:00:00     [code = INFLUENZA              Medical Ce

nter



                                       VACCINE (#1)]              

 

             Future Scheduled 2022   INFLUENZA VACCINE (#1)              C

[facility]



             Test         00:00:00     [code = INFLUENZA              Medical Ce

nter



                                       VACCINE (#1)]              

 

             Future Scheduled 2022   INFLUENZA VACCINE (#1)              C

[facility]



             Test         00:00:00     [code = INFLUENZA              Medical Ce

nter



                                       VACCINE (#1)]              

 

             Future Scheduled 2022   DEPRESSION SCREENING              [facility]



             Test         00:00:00     (12+) [code =              [facility] Center



                                       DEPRESSION SCREENING              



                                       (12+)]                    

 

             Future Scheduled 2022   DEPRESSION SCREENING              [facility]



             Test         00:00:00     (12+) [code =              Medical Center



                                       DEPRESSION SCREENING              



                                       (12+)]                    

 

             Future Scheduled 2022   DEPRESSION SCREENING              [facility]



             Test         00:00:00     (12+) [code =              Medical Center



                                       DEPRESSION SCREENING              



                                       (12+)]                    

 

             Future Scheduled 2022   DEPRESSION SCREENING              [facility]



             Test         00:00:00     (12+) [code =              Medical Center



                                       DEPRESSION SCREENING              



                                       (12+)]                    

 

             Future Scheduled 2022   DEPRESSION SCREENING              [facility]



             Test         00:00:00     (12+) [code =              Medical Center



                                       DEPRESSION SCREENING              



                                       (12+)]                    

 

             Future Scheduled 2022   DEPRESSION SCREENING              [facility]



             Test         00:00:00     (12+) [code =              Medical Center



                                       DEPRESSION SCREENING              



                                       (12+)]                    

 

             Future Scheduled 2022   DEPRESSION SCREENING              [facility]



             Test         00:00:00     (12+) [code =              Medical Center



                                       DEPRESSION SCREENING              



                                       (12+)]                    

 

             Future Scheduled 2022   DEPRESSION SCREENING              [facility]



             Test         00:00:00     (12+) [code =              Medical Center



                                       DEPRESSION SCREENING              



                                       (12+)]                    

 

             Future Scheduled 2022   DEPRESSION SCREENING              [facility]



             Test         00:00:00     (12+) [code =              Medical Center



                                       DEPRESSION SCREENING              



                                       (12+)]                    

 

             Future Scheduled 2021   INFLUENZA VACCINE (#1)              C

[facility]



             Test         00:00:00     [code = INFLUENZA              Medical Ce

nter



                                       VACCINE (#1)]              

 

             Future Scheduled 2021   COVID-19 VACCINE (3 -              [facility]

[facility]



             Test         00:00:00     Booster for Pfizer              Medical C

enter



                                       series) [code =              



                                       COVID-19 VACCINE (3 -              



                                       Booster for Pfizer              



                                       series)]                  

 

             Future Scheduled 2021   COVID-19 VACCINE (3 -              [facility]

[facility]



             Test         00:00:00     Booster for Pfizer              Medical C

enter



                                       series) [code =              



                                       COVID-19 VACCINE (3 -              



                                       Booster for Pfizer              



                                       series)]                  

 

             Future Scheduled 2021   COVID-19 VACCINE (3 -              [facility]

[facility]



             Test         00:00:00     Booster for Pfizer              Medical C

enter



                                       series) [code =              



                                       COVID-19 VACCINE (3 -              



                                       Booster for Pfizer              



                                       series)]                  

 

             Future Scheduled 2021   COVID-19 VACCINE (3 -              [facility]

[facility]



             Test         00:00:00     Booster for Pfizer              Medical C

enter



                                       series) [code =              



                                       COVID-19 VACCINE (3 -              



                                       Booster for Pfizer              



                                       series)]                  

 

             Future Scheduled 2021   COVID-19 VACCINE (3 -              [facility]

[facility]



             Test         00:00:00     Booster for Pfizer              Medical C

enter



                                       series) [code =              



                                       COVID-19 VACCINE (3 -              



                                       Booster for Pfizer              



                                       series)]                  

 

             Future Scheduled 2021   COVID-19 VACCINE (3 -              [facility]

[facility]



             Test         00:00:00     Booster for Pfizer              Medical C

enter



                                       series) [code =              



                                       COVID-19 VACCINE (3 -              



                                       Booster for Pfizer              



                                       series)]                  

 

             Future Scheduled 2021   COVID-19 VACCINE (3 -              [facility]

[facility]



             Test         00:00:00     Booster for Pfizer              Medical C

enter



                                       series) [code =              



                                       COVID-19 VACCINE (3 -              



                                       Booster for Pfizer              



                                       series)]                  

 

             Future Scheduled 2021   COVID-19 VACCINE (3 -              [facility]

[facility]



             Test         00:00:00     Booster for Pfizer              Medical C

enter



                                       series) [code =              



                                       COVID-19 VACCINE (3 -              



                                       Booster for Pfizer              



                                       series)]                  

 

             Future Scheduled 2021   COVID-19 VACCINE (3 -              [facility]

[facility]



             Test         00:00:00     Booster for Pfizer              Medical C

enter



                                       series) [code =              



                                       COVID-19 VACCINE (3 -              



                                       Booster for Pfizer              



                                       series)]                  

 

             Future Scheduled 2021   Screening for malignant              

[facility]



             Test         21:03:26     neoplasm of colon              of Medicin

e



                                       (procedure) [code =              



                                       359793405]                

 

             Future Scheduled 2021   TETANUS SHOT (ADULT)              [facility]



             Test         21:03:26     [code = TETANUS SHOT              of Medi

cine



                                       (ADULT)]                  

 

             Future Scheduled 2021   Hepatitis C screening              [facility]



             Test         21:03:26     (procedure) [code =              of Medic

ine



                                       [id number]]                

 

             Future Scheduled 2021   Human immunodeficiency              B

[facility]



             Test         21:03:26     virus screening              of Medicine



                                       (procedure) [code =              



                                       426346472]                

 

             Future Scheduled 2021   ZOSTER VACCINE (1 of 2)              

[facility]



             Test         21:03:26     [code = ZOSTER VACCINE              of Me

dicine



                                       (1 of 2)]                 

 

             Future Scheduled 2021   FLU VACCINE > 6 MONTHS              B

[facility]



             Test         21:03:26     [code = FLU VACCINE > 6              of M

edicine



                                       MONTHS]                   

 

             Future Scheduled 2021   BMI FOLLOW UP PLAN              [facility]



             Test         21:03:26     [code = BMI FOLLOW UP              of Med

icine



                                       PLAN]                     

 

             Future Scheduled 2016-10-11   SHINGLES VACCINES (1 of              

[facility]



             Test         00:00:00     2) [code = SHINGLES              Medical 

Center



                                       VACCINES (1 of 2)]              

 

             Future Scheduled 2016-10-11   SHINGLES VACCINES (1 of              

[facility]



             Test         00:00:00     2) [code = SHINGLES              Medical 

Center



                                       VACCINES (1 of 2)]              

 

             Future Scheduled 2016-10-11   SHINGLES VACCINES (1 of              

[facility]



             Test         00:00:00     2) [code = SHINGLES              Medical 

Center



                                       VACCINES (1 of 2)]              

 

             Future Scheduled 2016-10-11   SHINGLES VACCINES (1 of              

[facility]



             Test         00:00:00     2) [code = SHINGLES              Medical 

Center



                                       VACCINES (1 of 2)]              

 

             Future Scheduled 2016-10-11   SHINGLES VACCINES (1 of              

[facility]



             Test         00:00:00     2) [code = SHINGLES              Medical 

Center



                                       VACCINES (1 of 2)]              

 

             Future Scheduled 2016-10-11   SHINGLES VACCINES (1 of              

[facility]



             Test         00:00:00     2) [code = SHINGLES              Medical 

Center



                                       VACCINES (1 of 2)]              

 

             Future Scheduled 2016-10-11   SHINGLES VACCINES (1 of              

[facility]



             Test         00:00:00     2) [code = SHINGLES              Medical 

Center



                                       VACCINES (1 of 2)]              

 

             Future Scheduled 2016-10-11   SHINGLES VACCINES (1 of              

[facility]



             Test         00:00:00     2) [code = SHINGLES              Medical 

Center



                                       VACCINES (1 of 2)]              

 

             Future Scheduled 2016-10-11   SHINGLES VACCINES (1 of              

[facility]



             Test         00:00:00     2) [code = SHINGLES              Medical 

Center



                                       VACCINES (1 of 2)]              

 

             Future Scheduled 2016-10-11   SHINGLES VACCINES (1 of              

[facility]



             Test         00:00:00     2) [code = SHINGLES              Medical 

Center



                                       VACCINES (1 of 2)]              

 

             Future Scheduled 1985-10-11   DTAP/TDAP/TD VACCINES              [facility]

[facility]



             Test         00:00:00     (1 - Tdap) [code =              Medical C

enter



                                       DTAP/TDAP/TD VACCINES              



                                       (1 - Tdap)]               

 

             Future Scheduled 1985-10-11   DTAP/TDAP/TD VACCINES              [facility]

[facility]



             Test         00:00:00     (1 - Tdap) [code =              Medical C

enter



                                       DTAP/TDAP/TD VACCINES              



                                       (1 - Tdap)]               

 

             Future Scheduled 1985-10-11   DTAP/TDAP/TD VACCINES              [facility]

[facility]



             Test         00:00:00     (1 - Tdap) [code =              Medical C

enter



                                       DTAP/TDAP/TD VACCINES              



                                       (1 - Tdap)]               

 

             Future Scheduled 1985-10-11   DTAP/TDAP/TD VACCINES              [facility]

[facility]



             Test         00:00:00     (1 - Tdap) [code =              Medical C

enter



                                       DTAP/TDAP/TD VACCINES              



                                       (1 - Tdap)]               

 

             Future Scheduled 1985-10-11   DTAP/TDAP/TD VACCINES              

[facility]



             Test         00:00:00     (1 - Tdap) [code =              Medical C

enter



                                       DTAP/TDAP/TD VACCINES              



                                       (1 - Tdap)]               

 

             Future Scheduled 1985-10-11   DTAP/TDAP/TD VACCINES              [facility]

[facility]



             Test         00:00:00     (1 - Tdap) [code =              Medical C

enter



                                       DTAP/TDAP/TD VACCINES              



                                       (1 - Tdap)]               

 

             Future Scheduled 1985-10-11   DTAP/TDAP/TD VACCINES              [facility]

[facility]



             Test         00:00:00     (1 - Tdap) [code =              Medical C

enter



                                       DTAP/TDAP/TD VACCINES              



                                       (1 - Tdap)]               

 

             Future Scheduled 1985-10-11   DTAP/TDAP/TD VACCINES              [facility]

[facility]



             Test         00:00:00     (1 - Tdap) [code =              Medical C

enter



                                       DTAP/TDAP/TD VACCINES              



                                       (1 - Tdap)]               

 

             Future Scheduled 1985-10-11   DTAP/TDAP/TD VACCINES              [facility]

[facility]



             Test         00:00:00     (1 - Tdap) [code =              Medical C

enter



                                       DTAP/TDAP/TD VACCINES              



                                       (1 - Tdap)]               

 

             Future Scheduled 1985-10-11   DTAP/TDAP/TD VACCINES              [facility]

[facility]



             Test         00:00:00     (1 - Tdap) [code =              Medical C

enter



                                       DTAP/TDAP/TD VACCINES              



                                       (1 - Tdap)]               

 

             Future Scheduled 1984-10-11   HEPATITIS C SCREENING              [facility]

[facility]



             Test         00:00:00     [code = HEPATITIS C              Medical 

Center



                                       SCREENING]                

 

             Future Scheduled 1984-10-11   HEPATITIS C SCREENING              [facility]

[facility]



             Test         00:00:00     [code = HEPATITIS C              Medical 

Center



                                       SCREENING]                

 

             Future Scheduled 1984-10-11   HEPATITIS C SCREENING              [facility]

[facility]



             Test         00:00:00     [code = HEPATITIS C              Medical 

Center



                                       SCREENING]                

 

             Future Scheduled 1984-10-11   HEPATITIS C SCREENING              [facility]

[facility]



             Test         00:00:00     [code = HEPATITIS C              Medical 

Center



                                       SCREENING]                

 

             Future Scheduled 1984-10-11   HEPATITIS C SCREENING              [facility]

[facility]



             Test         00:00:00     [code = HEPATITIS C              Medical 

Center



                                       SCREENING]                

 

             Future Scheduled 1984-10-11   HEPATITIS C SCREENING              [facility]

[facility]



             Test         00:00:00     [code = HEPATITIS C              Medical 

Center



                                       SCREENING]                

 

             Future Scheduled 1984-10-11   HEPATITIS C SCREENING              [facility]

[facility]



             Test         00:00:00     [code = HEPATITIS C              Medical 

Center



                                       SCREENING]                

 

             Future Scheduled 1984-10-11   HEPATITIS C SCREENING              [facility]

[facility]



             Test         00:00:00     [code = HEPATITIS C              Medical 

Center



                                       SCREENING]                

 

             Future Scheduled 1984-10-11   HEPATITIS C SCREENING              [facility]

[facility]



             Test         00:00:00     [code = HEPATITIS C              Medical 

Center



                                       SCREENING]                

 

             Future Scheduled 1984-10-11   HEPATITIS C SCREENING              [facility]

[facility]



             Test         00:00:00     [code = HEPATITIS C              Medical 

Center



                                       SCREENING]                

 

             Future Scheduled 1966   Screening for malignant              

[facility]



             Test         00:00:00     neoplasm of colon              Medical Ce

nter



                                       (procedure) [code =              



                                       [id number]]                

 

             Future Scheduled 1966   CT Colonography (combo)              

[facility]



             Test         00:00:00     [code = CT Colonography              [facility]



                                       (combo)]                  

 

             Future Scheduled 1966   Screening for malignant              

[facility]



             Test         00:00:00     neoplasm of colon              Medical Ce

nter



                                       (procedure) [code =              



                                       [id number]]                

 

             Future Scheduled 1966   Screening for malignant              

[facility]



             Test         00:00:00     neoplasm of colon              Medical Ce

nter



                                       (procedure) [code =              



                                       [id number]]                

 

             Future Scheduled 1966   Screening for malignant              

[facility]



             Test         00:00:00     neoplasm of colon              Medical Ce

nter



                                       (procedure) [code =              



                                       [id number]]                

 

             Future Scheduled 1966   Screening for malignant              

[facility]



             Test         00:00:00     neoplasm of colon              Medical Ce

nter



                                       (procedure) [code =              



                                       [id number]]                

 

             Future Scheduled 1966   Sigmoidoscopy [code =              [facility]

[facility]



             Test         00:00:00     Sigmoidoscopy]              Medical Cente

r

 

             Future Scheduled 1966   CT Colonography (combo)              

[facility]



             Test         00:00:00     [code = CT Colonography              Medi

[facility]



                                       (combo)]                  

 

             Future Scheduled 1966   Screening for malignant              

[facility]



             Test         00:00:00     neoplasm of colon              Medical Ce

nter



                                       (procedure) [code =              



                                       [id number]]                

 

             Future Scheduled 1966   Screening for malignant              

[facility]



             Test         00:00:00     neoplasm of colon              Medical Ce

nter



                                       (procedure) [code =              



                                       915072795]                

 

             Future Scheduled 1966   Screening for malignant              

[facility]



             Test         00:00:00     neoplasm of colon              Medical Ce

nter



                                       (procedure) [code =              



                                       [id number]]                

 

             Future Scheduled 1966   Screening for malignant              

[facility]



             Test         00:00:00     neoplasm of colon              Medical Ce

nter



                                       (procedure) [code =              



                                       332752115]                

 

             Future Scheduled 1966   Sigmoidoscopy [code =              [facility]

[facility]



             Test         00:00:00     Sigmoidoscopy]              Medical Cente

r

 

             Future Scheduled 1966   CT Colonography (combo)              

[facility]



             Test         00:00:00     [code = CT Colonography              [facility]



                                       (combo)]                  

 

             Future Scheduled 1966   Screening for malignant              

[facility]



             Test         00:00:00     neoplasm of colon              Medical Ce

nter



                                       (procedure) [code =              



                                       [id number]]                

 

             Future Scheduled 1966   Screening for malignant              

[facility]



             Test         00:00:00     neoplasm of colon              Medical Ce

nter



                                       (procedure) [code =              



                                       183397102]                

 

             Future Scheduled 1966   Screening for malignant              

[facility]



             Test         00:00:00     neoplasm of colon              Medical Ce

nter



                                       (procedure) [code =              



                                       241649435]                

 

             Future Scheduled 1966   Screening for malignant              

[facility]



             Test         00:00:00     neoplasm of colon              Medical Ce

nter



                                       (procedure) [code =              



                                       648782774]                

 

             Future Scheduled 1966   Sigmoidoscopy [code =              [facility]

[facility]



             Test         00:00:00     Sigmoidoscopy]              [facility]

r

 

             Future Scheduled 1966   CT Colonography (combo)              

[facility]



             Test         00:00:00     [code = CT Colonography              [facility]

[facility]



                                       (combo)]                  

 

             Future Scheduled 1966   Screening for malignant              

[facility]



             Test         00:00:00     neoplasm of colon              Medical Ce

nter



                                       (procedure) [code =              



                                       428823045]                

 

             Future Scheduled 1966   Screening for malignant              

[facility]



             Test         00:00:00     neoplasm of colon              Medical Ce

nter



                                       (procedure) [code =              



                                       693514608]                

 

             Future Scheduled 1966   Screening for malignant              

[facility]



             Test         00:00:00     neoplasm of colon              Medical Ce

nter



                                       (procedure) [code =              



                                       359624390]                

 

             Future Scheduled 1966   Screening for malignant              

[facility]



             Test         00:00:00     neoplasm of colon              Medical Ce

nter



                                       (procedure) [code =              



                                       300017446]                

 

             Future Scheduled 1966   Sigmoidoscopy [code =              [facility]

[facility]



             Test         00:00:00     Sigmoidoscopy]              [facility]

r

 

             Future Scheduled 1966   CT Colonography (combo)              

[facility]



             Test         00:00:00     [code = CT Colonography              [facility]



                                       (combo)]                  

 

             Future Scheduled 1966   Screening for malignant              

[facility]



             Test         00:00:00     neoplasm of colon              Medical Ce

nter



                                       (procedure) [code =              



                                       371227509]                

 

             Future Scheduled 1966   Screening for malignant              

[facility]



             Test         00:00:00     neoplasm of colon              Medical Ce

nter



                                       (procedure) [code =              



                                       094618631]                

 

             Future Scheduled 1966   Screening for malignant              

[facility]



             Test         00:00:00     neoplasm of colon              Medical Ce

nter



                                       (procedure) [code =              



                                       581187824]                

 

             Future Scheduled 1966   Screening for malignant              

[facility]



             Test         00:00:00     neoplasm of colon              Medical Ce

nter



                                       (procedure) [code =              



                                       609174560]                

 

             Future Scheduled 1966   Sigmoidoscopy [code =              CH

[facility]



             Test         00:00:00     Sigmoidoscopy]              [facility]

r

 

             Future Scheduled 1966   CT Colonography (combo)              

[facility]



             Test         00:00:00     [code = CT Colonography              [facility]



                                       (combo)]                  

 

             Future Scheduled 1966   Screening for malignant              

[facility]



             Test         00:00:00     neoplasm of colon              Medical Ce

nter



                                       (procedure) [code =              



                                       744715252]                

 

             Future Scheduled 1966   Screening for malignant              

[facility]



             Test         00:00:00     neoplasm of colon              Medical Ce

nter



                                       (procedure) [code =              



                                       988774904]                

 

             Future Scheduled 1966   Screening for malignant              

[facility]



             Test         00:00:00     neoplasm of colon              Medical Ce

nter



                                       (procedure) [code =              



                                       224826544]                

 

             Future Scheduled 1966   Screening for malignant              

[facility]



             Test         00:00:00     neoplasm of colon              Medical Ce

nter



                                       (procedure) [code =              



                                       142061178]                

 

             Future Scheduled 1966   Sigmoidoscopy [code =              CH

[facility]



             Test         00:00:00     Sigmoidoscopy]              [facility] Cent[facility]

r

 

             Future Scheduled 1966   CT Colonography (combo)              

[facility]



             Test         00:00:00     [code = CT Colonography              [facility]



                                       (combo)]                  

 

             Future Scheduled 1966   Screening for malignant              

[facility]



             Test         00:00:00     neoplasm of colon              Medical Ce

nter



                                       (procedure) [code =              



                                       511799786]                

 

             Future Scheduled 1966   Screening for malignant              

[facility]



             Test         00:00:00     neoplasm of colon              Medical Ce

nter



                                       (procedure) [code =              



                                       647089233]                

 

             Future Scheduled 1966   Screening for malignant              

[facility]



             Test         00:00:00     neoplasm of colon              Medical Ce

nter



                                       (procedure) [code =              



                                       826171924]                

 

             Future Scheduled 1966   Screening for malignant              

[facility]



             Test         00:00:00     neoplasm of colon              Medical Ce

nter



                                       (procedure) [code =              



                                       464524542]                

 

             Future Scheduled 1966   Sigmoidoscopy [code =              CH

[facility]



             Test         00:00:00     Sigmoidoscopy]              [facility] Cent[facility]

r

 

             Future Scheduled 1966   CT Colonography (combo)              

[facility]



             Test         00:00:00     [code = CT Colonography              [facility]

[facility]



                                       (combo)]                  

 

             Future Scheduled 1966   Screening for malignant              

[facility]



             Test         00:00:00     neoplasm of colon              Medical Ce

nter



                                       (procedure) [code =              



                                       867281284]                

 

             Future Scheduled 1966   Screening for malignant              

[facility]



             Test         00:00:00     neoplasm of colon              Medical Ce

nter



                                       (procedure) [code =              



                                       680226210]                

 

             Future Scheduled 1966   Screening for malignant              

[facility]



             Test         00:00:00     neoplasm of colon              Medical Ce

nter



                                       (procedure) [code =              



                                       057567779]                

 

             Future Scheduled 1966   Screening for malignant              

[facility]



             Test         00:00:00     neoplasm of colon              Medical Ce

nter



                                       (procedure) [code =              



                                       833522460]                

 

             Future Scheduled 1966   Sigmoidoscopy [code =              CH

[facility]



             Test         00:00:00     Sigmoidoscopy]              [facility]

r

 

             Future Scheduled 1966   CT Colonography (combo)              

[facility]



             Test         00:00:00     [code = CT Colonography              [facility]



                                       (combo)]                  

 

             Future Scheduled 1966   Screening for malignant              

[facility]



             Test         00:00:00     neoplasm of colon              Medical Ce

nter



                                       (procedure) [code =              



                                       854617849]                

 

             Future Scheduled 1966   Screening for malignant              

[facility]



             Test         00:00:00     neoplasm of colon              Medical Ce

nter



                                       (procedure) [code =              



                                       623607408]                

 

             Future Scheduled 1966   Screening for malignant              

[facility]



             Test         00:00:00     neoplasm of colon              Medical Ce

nter



                                       (procedure) [code =              



                                       957328620]                

 

             Future Scheduled 1966   Screening for malignant              

[facility]



             Test         00:00:00     neoplasm of colon              Medical Ce

nter



                                       (procedure) [code =              



                                       127296794]                

 

             Future Scheduled 1966   Sigmoidoscopy [code =              CH

[facility]



             Test         00:00:00     Sigmoidoscopy]              [facility]

r

 

             Future Scheduled              Screening for malignant              

[facility]



             Test                      neoplasm of colon              of Medicin

e



                                       (procedure) [code =              



                                       635705241]                

 

             Future Scheduled              TETANUS SHOT (ADULT)              [facility]

[employer]



             Test                      [code = TETANUS SHOT              of Medi

cine



                                       (ADULT)]                  

 

             Future Scheduled              BMI FOLLOW UP PLAN              [employer]

[facility] [employer]



             Test                      [code = BMI FOLLOW UP              of Med

icine



                                       PLAN]                     

 

             Future Scheduled              Hepatitis C screening              [employer]

[employer]



             Test                      (procedure) [code =              of Medic

ine



                                       430349563]                

 

             Future Scheduled              [facility]              [facility]



             [facility]                      [facility] screening              of [facility]



                                       (procedure) [code =              



                                       960176218]                

 

             Future Scheduled              ZOSTER VACCINE (1 of 2)              

[facility]



             Test                      [code = ZOSTER VACCINE              of Me

dicine



                                       (1 of 2)]                 

 

             Future Scheduled              FLU VACCINE > 6 MONTHS              [facility]



             Test                      [code = FLU VACCINE > 6              of M

edicine



                                       MONTHS]                   

 

             Future Scheduled              CT HEAD WO CONTRAST 1 Occurrences [facility]

[facility]



             Test                      [code = 47335-2] starting     of Medicine



                                                    04/15/2021 until 



                                                    04/15/2022   

 

             Goal                      Plan of Care Note [code              



                                       = 72985-4]                

 

             Goal                      Plan of Care Note [code              



                                       = 01558-2]                

 

             Goal                      Plan of Care Note [code              



                                       = 21755-2]                

 

             Goal                      Plan of Care Note [code              



                                       = 78937-9]                

 

             Goal                      Plan of Care Note [code              



                                       = 40828-4]                

 

             Goal                      Plan of Care Note [code              



                                       = 04322-6]                

 

             Goal                      Plan of Care Note [code              



                                       = 88825-6]                

 

             Goal                      Plan of Care Note [code              



                                       = 90865-3]                

 

             Goal                      Plan of Care Note [code              



                                       = 80307-6]                

 

             Goal                      Plan of Care Note [code              



                                       = 29832-5]                

 

             Goal                      Plan of Care Note [code              



                                       = 72281-7]                

 

             Goal                      Plan of Care Note [code              



                                       = 20366-1]                

 

             Goal                      Plan of Care Note [code              



                                       = 80581-5]                

 

             Goal                      Plan of Care Note [code              



                                       = 44344-2]                

 

             Goal                      Plan of Care Note [code              



                                       = 28607-0]                

 

             Goal                      Plan of Care Note [code              



                                       = 01221-8]                

 

             Goal                      Plan of Care Note [code              



                                       = 15931-1]                

 

             Goal                      Plan of Care Note [code              



                                       = 07304-5]                

 

             Goal                      Plan of Care Note [code              



                                       = 90791-8]                

 

             Goal                      Plan of Care Note [code              



                                       = 01258-0]                

 

             Goal                      Plan of Care Note [code              



                                       = 91431-9]                

 

             Goal                      Plan of Care Note [code              



                                       = 61004-2]                

 

             Goal                      Plan of Care Note [code              



                                       = 56511-6]                

 

             Goal                      Plan of Care Note [code              



                                       = 65680-1]                

 

             Goal                      Plan of Care Note [code              



                                       = 58623-6]                

 

             Goal                      Plan of Care Note [code              



                                       = 85985-8]                

 

             Goal                      Plan of Care Note [code              



                                       = 55656-9]                

 

             Goal                      Plan of Care Note [code              



                                       = 33660-2]                

 

             Goal                      Plan of Care Note [code              



                                       = 98972-4]                

 

             Goal                      Plan of Care Note [code              



                                       = 50048-4]                

 

             Goal                      Plan of Care Note [code              



                                       = 37013-2]                

 

             Goal                      Plan of Care Note [code              



                                       = 38326-4]                

 

             Goal                      Plan of Care Note [code              



                                       = 07762-8]                

 

             Goal                      Plan of Care Note [code              



                                       = 00162-5]                

 

             Goal                      Plan of Care Note [code              



                                       = 88892-1]                

 

             Goal                      Plan of Care Note [code              



                                       = 61133-2]                

 

             Goal                      Plan of Care Note [code              



                                       = 01616-6]                

 

             Goal                      Plan of Care Note [code              



                                       = 49632-0]                

 

             Goal                      Plan of Care Note [code              



                                       = 63609-5]                

 

             Goal                      Plan of Care Note [code              



                                       = 68878-8]                

 

             Goal                      Plan of Care Note [code              



                                       = 10661-9]                

 

             Goal                      Plan of Care Note [code              



                                       = 64660-9]                

 

             Goal                      Plan of Care Note [code              



                                       = 00337-8]                

 

             Goal                      Plan of Care Note [code              



                                       = 55836-6]                

 

             Goal                      Plan of Care Note [code              



                                       = 44547-1]                

 

             Goal                      Plan of Care Note [code              



                                       = 34950-4]                

 

             Goal                      Plan of Care Note [code              



                                       = 29068-4]                

 

             Goal                      Plan of Care Note [code              



                                       = 12651-6]                

 

             Goal                      Plan of Care Note [code              



                                       = 17043-7]                

 

             Goal                      Plan of Care Note [code              



                                       = 72384-2]                

 

             Goal                      Plan of Care Note [code              



                                       = 91500-9]                

 

             Goal                      Plan of Care Note [code              



                                       = 67542-4]                







Encounters







        Start   End     Encounter Admission Attending Care    Care    Encounter 

Source



        Date/Time Date/Time Type    Type    Clinicians Facility Department ID   

   

 

        2021-10-09         Inpatient ER      [facility]    Neurology [id number]

[id number] SLE



        11:52:04                         [place]                         

 

        2021         Inpatient ER      [facility]    Neurosurger [id number]

[id number] SLE



        19:54:00                         JIN           y               

 

        2022-10-28 2022-10-28 Outpatient                 S[facility]     [facility]     [id number] [facility]



        16:35:49 16:35:49                                         [id number]   F



                                                                        Lizandro

 

        2022-10-28 2022-10-28 Outpatient                 [id number]

[id number] 



        00:00:00 00:00:00 Visit                   2d71-86ya         k83-92rw-d 



                                                -t62m-9s0         82e-7f77b4 



                                                8v471et2w         04fd0c  

 

        2022-10-21 2022-10-21 Outpatient                 S[facility]     [facility]     [id number]

 Anton



        15:49:39 15:49:39                                         [id number]   F



                                                                        Lizandro

 

        2022 Outpatient                 [id number]

[id number]-g 



        00:00:00 00:00:00 Visit                   xu97-643b         e17-636f-6 



                                                -5i76-903         [id number] 



                                                [id number]         c2a3d4  

 

        2022 Outpatient                 [id number]

[id number] 



        00:00:00 00:00:00 Visit                   h892-63hu         301-43be-b 



                                                -q437-63k         483-06f4d1 



                                                0p5rh4wmb         af4dcb  

 

        2022 Outpatient                 [id number]- [id number]

[id number] 



        00:00:00 00:00:00 Visit                   81d6-3757         4c5-5015-o 



                                                -g7h6-0up         7n9-2vy0s9 



                                                [id number]         c62b91  

 

        2021 Office          [facility]     [phone] [id number]

[id number] [facility]



        08:02:38 13:12:44 Visit           , Jose A AMBULATOR 350.1.13.21        

 College



                                                Y       0.2.7.2.686         of



                                                        [phone]         [facility]



                                                        300             e

 

        2021 Office          [facility]     1.2.840.114 [id number]

[id number] 



        08:02:38 13:12:44 Visit           , Jose A AMBULATOR 350.1.13.21        

 



                                                Y       0.2.7.2.686         



                                                        [phone]         



                                                        300             

 

        2021 Outpatient RICHARD JEAN [facility]   

 [id number] [facility]



        09:20:00 09:20:00                 RICHARD BROWN                       

  luisa [facility]

 

        2021 [facility]    [facility]    [id number]

[id number] [facility]



        00:00:00 00:00:00                 IAN                         

 

        2021-04-15 2021-04-15 Office          AIDEE Santillan     1.2.840.114 [id number]

[id number] [facility]



        12:08:37 14:38:23 Visit           Ian AMBULATOR 350.1.13.21     

    College



                                        Kiran   Y       0.2.7.2.686         of



                                                        [phone]         [facility]



                                                        300             e

 

        2021-04-15 2021-04-15 Office          AIDEE Santillan     1.2.840.114 [id number]

[id number] 



        12:08:37 14:38:23 Visit           Ian AMBULATOR 350.1.13.21     

    



                                        Kiran   Y       0.2.7.2.686         



                                                        [phone]         



                                                        300             

 

        2021 Outpatient TRACEY BISHOP [facility]    [id number]

[id number] [facility]



        09:30:00 09:30:00                 TARI                             [facility]

 

        2021 Outpatient TRACEY BISHOP [facility]    [id number]

[id number] [facility]



        09:30:00 09:30:00                 TARI                             [facility]

 

        2021 Outpatient TRACEY BISHOP [facility]    [id number]

[id number] [facility]



        09:10:00 09:10:00                 TARI                             [facility]

 

        2020-11-10 2020-11-10 [facility]         SOPHIA Evans 1.2.840.114 [id number]

[id number] Univers



        14:33:00 23:59:00 Encounter         Eliazar SOLER       350.1.13.10         

ity of



                                                [facility] 4.2.7.2.686         Ari

as



                                                        [phone]         Medi

chilo



                                                        031             [place]

 

        2020-11-10 2020-11-10 [facility]         SOPHIA Evans 1.2.840.114 [id number]

[id number] 



        14:33:00 23:59:00 Encounter         Eliazar SOLER       350.1.13.10         



                                                BUILDING 4.2.7.2.686         



                                                        [phone]         



                                                        031             

 

        2020-11-10 2020-11-10 [facility]    [facility]     [id number]

[id number] [facility]



        00:00:00 00:00:00                 ELIAZAR mosley [facility]

 

        2020-11-10 2020-11-10 Orders          Doctor FLORES    1.2.840.114 [id number]

[id number] Univers



        00:00:00 00:00:00 Only            Unassigned, BELEM   350.1.13.10       

  ity of



                                        [facility] 4.2.7.2.686         [place]

as



                                                        [phone]         Medi

chilo



                                                        009             [place]

 

        2020-11-10 2020-11-10 Orders          Doctor SANDRA Antonio2.840.114 [id number]

17 



        00:00:00 00:00:00 Only            Unassigned, BELEM   350.1.13.10       

  



                                        [facility] 4.2.7.2.686         



                                                        [phone]         



                                                        009             

 

        2020 Office          [facility]    1.2.840.114 [id number]

133 Univers



        11:17:09 12:03:56 Visit           [facility]  350.1.13.10        

 ity of



                                                [place] 4.2.7.2.686         [place]

as



                                                [employer] [phone]         [address]



                                                Office                  



                                                Building                 



                                                One                     

 

        2020 Office          [facility]    1.2.840.114 [id number]

[id number] 



        11:17:09 12:03:56 Visit           [facility]  350.1.13.10        

 



                                                [place] 4.2.7.2.686         



                                                [employer] [phone]         



                                                [place] [address]             



                                                Office                  



                                                Building                 



                                                One                     

 

        2020 Outpatient R       FELECIA [facility]    [id number]

[id number] [facility]



        11:30:00 11:30:00                 WONDIFUL                         ity o

f



                                                                        [facility]

 

        2020 Letter          Doctor  SANDRA    1.2.840.114 [id number]

89 Univers



        00:00:00 00:00:00 (Out)           Unassigned, BELEM   350.1.13.10       

  ity of



                                        [facility] 4.2.7.2.686         [place]

as



                                                        [phone]         [address]

 

        2020 Refill          [facility]    1.2.840.114 [id number]

062 Univers



        00:00:00 00:00:00                 [facility]  350.1.13.10        

 ity of



                                                [place] 4.2.7.2.686         [place]

as



                                                [employer] [phone]         [address]



                                                Office                  



                                                Building                 



                                                One                     

 

        2019 Orders          Doctor  SANDRA    1.2.840.114 [id number]

74 Univers



        00:00:00 00:00:00 Only            Unassigned, BELEM   350.1.13.10       

  ity of



                                        [facility] 4.2.7.2.686         [place]

as



                                                        [phone]         [address]







Results







           Test Description Test Time  Test Comments Results    Result Comments 

Source









                    VITAMIN B-12        2022 09:21:57 









                      Test Item  Value      Reference Range Interpretation Comme

[facility]









             VITAMIN B-12 (test code = 2840) 1183 PG/ML   200-950      H        

    



VITAMIN D, 25 [id number] 07:12:16





             Test Item    Value        Reference Range Interpretation Comments

 

             VITAMIN D, 25 OH 41 NG/ML     SEE BELOW                  NOTE: 25-H

YDROXYVITAMIN D



             (test code = 4958)                                        [facility]

LUDES



                                                                 25-HYDROXYVITAM

IN D2 AND



                                                                 D3. METHODOLOGY

 IS



                                                                 CHEMILUMINESCEN

T



                                                                 IMMUNOASSAY. **

***



                                                                 INTERPRETIVE RA

NGES



                                                                 *****PEDIATRIC 

(<17 YEARS)



                                                                 . . . . . . . .

 . . . NG/ML



                                                                 20-100ADULT: IN

SUFFICIENT .



                                                                 . . . . . . . .

 . . . . .



                                                                 NG/ML <20 SUBOP

TIMAL . . .



                                                                 . . . . . . . .

 . . . .



                                                                 NG/ML 20-29 OPT

IMAL . . . .



                                                                 . . . . . . . .

 . . . . .



                                                                 NG/ML  UN

LESS



                                                                 OTHERWISE INDIC

ATED, ALL



                                                                 TESTING PERFORM

ED



                                                                 ATCLINICAL PATH

[facility]. [address] [place] [address]



                                                                 LABORATORY DIRE

CTOR: GABRIELA MENDEZ CLIA



                                                                 NUMBER [id number]

03 CAP



                                                                 ACCREDITATION N

O. [id number]



[id number] 06:57:09





             Test Item    Value        Reference Range Interpretation Comments

 

             MAGNESIUM (test code = 2226) 2.4 MG/DL    1.6-2.6                  

 



[id number] 00:00:00





             Test Item    Value        Reference Range Interpretation Comments

 

             MAGNESIUM (test code = 2226) 2.4 MG/DL                             

 



[id number] 00:00:00





             Test Item    Value        Reference Range Interpretation Comments

 

             MAGNESIUM (test code = 2226) 2.4 MG/DL                             

 



[id number] 00:00:00





             Test Item    Value        Reference Range Interpretation Comments

 

             MAGNESIUM (test code = 2226) 2.4 MG/DL                             

 



VITAMIN [id number] 00:00:00





             Test Item    Value        Reference Range Interpretation Comments

 

             VITAMIN B-12 (test code = 2840) 1183 PG/ML                         

    



VITAMIN [id number] 00:00:00





             Test Item    Value        Reference Range Interpretation Comments

 

             VITAMIN B-12 (test code = 2840) 1183 PG/ML                         

    



VITAMIN [id number] 00:00:00





             Test Item    Value        Reference Range Interpretation Comments

 

             VITAMIN B-12 (test code = 2840) 1183 PG/ML                         

    



VITAMIN D, 25 [id number] 00:00:00





             Test Item    Value        Reference Range Interpretation Comments

 

             VITAMIN D, 25 OH (test code = 4958) 41 NG/ML                       

        



VITAMIN D, 25 [id number] 00:00:00





             Test Item    Value        Reference Range Interpretation Comments

 

             VITAMIN D, 25 OH (test code = 4958) 41 NG/ML                       

        



[id number] 00:00:00





             Test Item    Value        Reference Range Interpretation Comments

 

             MAGNESIUM (test code = 2226) 2.4 MG/DL                             

 



[id number] 00:00:00





             Test Item    Value        Reference Range Interpretation Comments

 

             MAGNESIUM (test code = 2226) 2.4 MG/DL                             

 



[id number] 00:00:00





             Test Item    Value        Reference Range Interpretation Comments

 

             MAGNESIUM (test code = 2226) 2.4 MG/DL                             

 



VITAMIN [id number] 00:00:00





             Test Item    Value        Reference Range Interpretation Comments

 

             VITAMIN B-12 (test code = 2840) 1183 PG/ML                         

    



VITAMIN [id number] 00:00:00





             Test Item    Value        Reference Range Interpretation Comments

 

             VITAMIN B-12 (test code = 2840) 1183 PG/ML                         

    



VITAMIN [id number] 00:00:00





             Test Item    Value        Reference Range Interpretation Comments

 

             VITAMIN B-12 (test code = 2840) 1183 PG/ML                         

    



VITAMIN D, 25 [id number] 00:00:00





             Test Item    Value        Reference Range Interpretation Comments

 

             VITAMIN D, 25 OH (test code = 4958) 41 NG/ML                       

        



VITAMIN D, 25 [id number] 00:00:00





             Test Item    Value        Reference Range Interpretation Comments

 

             VITAMIN D, 25 OH (test code = 4958) 41 NG/ML                       

        



OCCULT BLD,FECAL,IMMUNOASSAY [id number] 10:19:36





             Test Item    Value        Reference Range Interpretation Comments

 

             OCCULT BLD, FECAL NEGATIVE     NEGATIVE                   UNLESS OT

HERWISE



             (test code = 26297)                                        INDICATE

D, ALL TESTING



                                                                 PERFORMED ATCLI

NICAL



                                                                 PATHOLOGY LABOR

[facility]



                                                                 [facility]. [address] [place] [address] [facility]



                                                                 DIRECTOR: AUGUST LAM M.D.

 CLIA



                                                                 NUMBER [id number]

03 CAP



                                                                 ACCREDITATION N

O. [id number]



OCCULT BLD,FECAL,IMMUNOASSAY [id number] 00:00:00





             Test Item    Value        Reference Range Interpretation Comments

 

             OCCULT BLD, FECAL (test code = NEGATIVE                            

   



             57931)                                              



OCCULT BLD,FECAL,IMMUNOASSAY [id number] 00:00:00





             Test Item    Value        Reference Range Interpretation Comments

 

             OCCULT BLD, FECAL (test code = NEGATIVE                            

   



             48056)                                              



OCCULT BLD,FECAL,IMMUNOASSAY [id number] 00:00:00





             Test Item    Value        Reference Range Interpretation Comments

 

             OCCULT BLD, FECAL (test code = NEGATIVE                            

   



             76969)                                              



OCCULT BLD,FECAL,IMMUNOASSAY [id number] 00:00:00





             Test Item    Value        Reference Range Interpretation Comments

 

             OCCULT BLD, FECAL (test code = NEGATIVE                            

   



             50560)                                              



OCCULT BLD,FECAL,IMMUNOASSAY [id number] 00:00:00





             Test Item    Value        Reference Range Interpretation Comments

 

             OCCULT BLD, FECAL (test code = NEGATIVE                            

   



             64049)                                              



OCCULT BLD,FECAL,IMMUNOASSAY [id number] 00:00:00





             Test Item    Value        Reference Range Interpretation Comments

 

             OCCULT BLD, FECAL (test code = NEGATIVE                            

   



             65114)                                              



OCCULT BLD,FECAL,IMMUNOASSAY [id number] 00:00:00





             Test Item    Value        Reference Range Interpretation Comments

 

             OCCULT BLD, FECAL (test code = NEGATIVE                            

   



             32315)                                              



OCCULT BLD,FECAL,IMMUNOASSAY [id number] 00:00:00





             Test Item    Value        Reference Range Interpretation Comments

 

             OCCULT BLD, FECAL (test code = NEGATIVE                            

   



             51741)                                              



OCCULT BLD,FECAL,IMMUNOASSAY [id number] 00:00:00





             Test Item    Value        Reference Range Interpretation Comments

 

             OCCULT BLD, FECAL (test code = NEGATIVE                            

   



             95869)                                              



PSA, [id number] 18:49:23





             Test Item    Value        Reference Range Interpretation Comments

 

             PSA, TOTAL   0.49 NG/ML   See_Comment                NOTE: Methodol

izzyy is Roche



             (test code =                                        [employer]

emiluminescence



             2606)                                               Immunoassay tra

ceable to WHO



                                                                 reference stand

vijay 96/760.



                                                                 [Automated mess

age] The system



                                                                 which generated

 this result



                                                                 transmitted ref

erence range:



                                                                 <=4.00. The ref

erence range was



                                                                 not used to int

erpret this



                                                                 result as celia

l/abnormal.



HIV 1/2 4TH GEN, RFLX [id number] 02:55:39





             Test Item    Value        Reference Range Interpretation Comments

 

             HIV 1/2 4TH GEN, NON-REACTIVE NON-REACTIVE               UNLESS OTH

ERWISE



             RFLX CONF (test                                        INDICATED, A

LL TESTING



             code = 3514)                                        PERFORMED [facility]



                                                                 PATHOLOGY [facility],



                                                                 [facility]. [address] [place] [address] FERNY

REY



                                                                 DIRECTOR: AUGUST LAM M.D.

 CLIA



                                                                 NUMBER [id number]

03 CAP



                                                                 ACCREDITATION N

O.



                                                                 [id number]



PSA, [id number] 00:00:00





             Test Item    Value        Reference Range Interpretation Comments

 

             PSA, TOTAL (test code = 2606) 0.49 NG/ML                           

  



PSA, [id number] 00:00:00





             Test Item    Value        Reference Range Interpretation Comments

 

             PSA, TOTAL (test code = 2606) 0.49 NG/ML                           

  



PSA, [id number] 00:00:00





             Test Item    Value        Reference Range Interpretation Comments

 

             PSA, TOTAL (test code = 2606) 0.49 NG/ML                           

  



HIV 1/2 4TH GEN, RFLX [id number] 00:00:00





             Test Item    Value        Reference Range Interpretation Comments

 

             HIV 1/2 4TH GEN, RFLX CONF (test NON-REACTIVE                      

     



             code = 3514)                                        



HIV 1/2 4TH GEN, RFLX [id number] 00:00:00





             Test Item    Value        Reference Range Interpretation Comments

 

             HIV 1/2 4TH GEN, RFLX CONF (test NON-REACTIVE                      

     



             code = 3514)                                        



PSA, [id number] 00:00:00





             Test Item    Value        Reference Range Interpretation Comments

 

             PSA, TOTAL (test code = 2606) 0.49 NG/ML                           

  



PSA, [id number] 00:00:00





             Test Item    Value        Reference Range Interpretation Comments

 

             PSA, TOTAL (test code = 2606) 0.49 NG/ML                           

  



PSA, [id number] 00:00:00





             Test Item    Value        Reference Range Interpretation Comments

 

             PSA, TOTAL (test code = 2606) 0.49 NG/ML                           

  



PSA, [id number] 00:00:00





             Test Item    Value        Reference Range Interpretation Comments

 

             PSA, TOTAL (test code = 2606) 0.49 NG/ML                           

  



HIV 1/2 4TH GEN, RFLX [id number] 00:00:00





             Test Item    Value        Reference Range Interpretation Comments

 

             HIV 1/2 4TH GEN, RFLX CONF (test NON-REACTIVE                      

     



             code = 3514)                                        



PSA, [id number] 00:00:00





             Test Item    Value        Reference Range Interpretation Comments

 

             PSA, TOTAL (test code = 2606) 0.49 NG/ML                           

  



PSA, [id number] 00:00:00





             Test Item    Value        Reference Range Interpretation Comments

 

             PSA, TOTAL (test code = 2606) 0.49 NG/ML                           

  



HIV 1/2 4TH GEN, RFLX [id number] 00:00:00





             Test Item    Value        Reference Range Interpretation Comments

 

             HIV 1/2 4TH GEN, RFLX CONF (test NON-REACTIVE                      

     



             code = 3514)                                        



HIV 1/2 4TH GEN, RFLX [id number] 00:00:00





             Test Item    Value        Reference Range Interpretation Comments

 

             HIV 1/2 4TH GEN, RFLX CONF (test NON-REACTIVE                      

     



             code = 3514)                                        



HIV 1/2 4TH GEN, RFLX [id number] 00:00:00





             Test Item    Value        Reference Range Interpretation Comments

 

             HIV 1/2 4TH GEN, RFLX CONF (test NON-REACTIVE                      

     



             code = 3514)                                        



PSA, [id number] 00:00:00





             Test Item    Value        Reference Range Interpretation Comments

 

             PSA, TOTAL (test code = 2606) 0.49 NG/ML                           

  



PSA, [id number] 00:00:00





             Test Item    Value        Reference Range Interpretation Comments

 

             PSA, TOTAL (test code = 2606) 0.49 NG/ML                           

  



PSA, [id number] 00:00:00





             Test Item    Value        Reference Range Interpretation Comments

 

             PSA, TOTAL (test code = 2606) 0.49 NG/ML                           

  



HIV 1/2 4TH GEN, RFLX [id number] 00:00:00





             Test Item    Value        Reference Range Interpretation Comments

 

             HIV 1/2 4TH GEN, RFLX CONF (test NON-REACTIVE                      

     



             code = 3514)                                        



HIV 1/2 4TH GEN, RFLX [id number] 00:00:00





             Test Item    Value        Reference Range Interpretation Comments

 

             HIV 1/2 4TH GEN, RFLX CONF (test NON-REACTIVE                      

     



             code = 3514)                                        



LIPID [id number] 03:15:18





             Test Item    Value        Reference Range Interpretation Comments

 

             CHOLESTEROL (test 211 MG/DL    <200         H            



             code = 2210)                                        

 

             TRIGLYCERIDES (test 81 MG/DL     <150                      



             code = 2232)                                        

 

             HDL CHOLESTEROL (test 43 MG/DL     >39                       



             code = 2220)                                        

 

             CALC LDL CHOL (test 150 MG/DL    <100         H             NOTE: C

ALCULATED LDL



             code = 2237)                                        IS BASED ON



                                                                 ANNE MARIE-CHASE 

METHOD



                                                                 WHICHINCLUDES



                                                                 ADJUSTABLE



                                                                 TRIGLYCERIDE:VL

DL



                                                                 CHOLESTEROL RAT

IO.THIS



                                                                 FACTOR VARIES B

Y



                                                                 MEASURED TRIGLY

CERIDE



                                                                 AND NON-HDLCHOL

ESTEROL



                                                                 CONCENTRATIONS 

WITH



                                                                 INCREASED CALCU

LATED



                                                                 LDL SEENIN HIGH

ER



                                                                 TRIGLYCERIDE OR

 LOWER



                                                                 NON-HDL SPECIME

NS. FOR



                                                                 MOREINFORMATION

, SEE



                                                                 CLIENT ANNOUNCE

MENT AT



                                                                 [email or web address]



                                                                 [email or web address]

 

             RISK RATIO LDL/HDL 3.49 RATIO   <3.55                     



             (test code = 2238)                                        



COMPREHENSIVE METABOLIC [id number] 03:15:18





             Test Item    Value        Reference Range Interpretation Comments

 

             GLUCOSE (test code = 99 MG/DL     70-99                     



             )                                               

 

             BUN (test code = 23 MG/DL     6-20         H            



             )                                               

 

             CREATININE (test 1.10 MG/DL   0.80-1.40                 



             code = 221)                                        

 

             eGFR ( CKD-EPI) 79 ML/MIN/1.73 >60                       



             (test code = 42424)                                        

 

             CALC BUN/CREAT (test 21 RATIO     6-28                      



             code = 2235)                                        

 

             SODIUM (test code = 138 MEQ/L    133-146                   



             )                                               

 

             POTASSIUM (test code 4.4 MEQ/L    3.5-5.4                   



             = )                                             

 

             CHLORIDE (test code 101 MEQ/L                        



             = )                                             

 

             CARBON DIOXIDE (test 23 MEQ/L     19-31                     



             code = 2206)                                        

 

             CALCIUM (test code = 9.1 MG/DL    8.5-10.5                  



             )                                               

 

             PROTEIN, TOTAL (test 7.0 G/DL     6.1-8.3                   



             code = 222)                                        

 

             ALBUMIN (test code = 4.7 G/DL     3.5-5.2                   



             )                                               

 

             CALC GLOBULIN (test 2.3 G/DL     1.9-3.7                   



             code = 2240)                                        

 

             CALC A/G RATIO (test 2.0 RATIO    1.0-2.6                   



             code = 2234)                                        

 

             BILIRUBIN, TOTAL 0.6 MG/DL    See_Comment                [Automated

 message]



             (test code = 2207)                                        The syste

m which



                                                                 generated this



                                                                 result transmit

alessia



                                                                 reference range

:



                                                                 <=1.2. The refe

rence



                                                                 range was not u

sed



                                                                 to interpret th

is



                                                                 result as



                                                                 normal/abnormal

.

 

             ALKALINE PHOSPHATASE 65 U/L                           



             (test code = 2204)                                        

 

             AST (test code = 19 U/L       9-50                      



             )                                               

 

             ALT (test code = 19 U/L       5-50                      



             )                                               



COMPREHENSIVE METABOLIC [id number] 00:00:00





             Test Item    Value        Reference Range Interpretation Comments

 

             GLUCOSE (test code = 2217) 99 MG/DL                               

 

             BUN (test code = 2208) 23 MG/DL                               

 

             CREATININE (test code = 2214) 1.10 MG/DL                           

  

 

             eGFR ( CKD-EPI) (test code 79 ML/MIN/1.73                      

     



             = 83868)                                            

 

             CALC BUN/CREAT (test code = 21 RATIO                               



             2235)                                               

 

             SODIUM (test code = 2231) 138 MEQ/L                              

 

             POTASSIUM (test code = 2228) 4.4 MEQ/L                             

 

 

             CHLORIDE (test code = 2215) 101 MEQ/L                              

 

             CARBON DIOXIDE (test code = 23 MEQ/L                               



             )                                               

 

             CALCIUM (test code = 2209) 9.1 MG/DL                              

 

             PROTEIN, TOTAL (test code = 7.0 G/DL                               



             )                                               

 

             ALBUMIN (test code = 2201) 4.7 G/DL                               

 

             CALC GLOBULIN (test code = 2.3 G/DL                               



             )                                               

 

             CALC A/G RATIO (test code = 2.0 RATIO                              



             )                                               

 

             BILIRUBIN, TOTAL (test code = 0.6 MG/DL                            

  



             )                                               

 

             ALKALINE PHOSPHATASE (test 65 U/L                                 



             code = 2204)                                        

 

             AST (test code = 2218) 19 U/L                                 

 

             ALT (test code = 2219) 19 U/L                                 



LIPID [id number] 00:00:00





             Test Item    Value        Reference Range Interpretation Comments

 

             CHOLESTEROL (test code = 2210) 211 MG/DL                           

   

 

             TRIGLYCERIDES (test code = 2232) 81 MG/DL                          

     

 

             HDL CHOLESTEROL (test code = 2220) 43 MG/DL                        

       

 

             CALC LDL CHOL (test code = 2237) 150 MG/DL                         

     

 

             RISK RATIO LDL/HDL (test code = 3.49 RATIO                         

    



             2238)                                               



LIPID [id number] 00:00:00





             Test Item    Value        Reference Range Interpretation Comments

 

             CHOLESTEROL (test code = 2210) 211 MG/DL                           

   

 

             TRIGLYCERIDES (test code = 2232) 81 MG/DL                          

     

 

             HDL CHOLESTEROL (test code = 2220) 43 MG/DL                        

       

 

             CALC LDL CHOL (test code = 2237) 150 MG/DL                         

     

 

             RISK RATIO LDL/HDL (test code = 3.49 RATIO                         

    



             2238)                                               



COMPREHENSIVE METABOLIC [id number] 00:00:00





             Test Item    Value        Reference Range Interpretation Comments

 

             GLUCOSE (test code = 2217) 99 MG/DL                               

 

             BUN (test code = 2208) 23 MG/DL                               

 

             CREATININE (test code = 2214) 1.10 MG/DL                           

  

 

             eGFR ( CKD-EPI) (test code 79 ML/MIN/1.73                      

     



             = 96509)                                            

 

             CALC BUN/CREAT (test code = 21 RATIO                               



             2235)                                               

 

             SODIUM (test code = 2231) 138 MEQ/L                              

 

             POTASSIUM (test code = 2228) 4.4 MEQ/L                             

 

 

             CHLORIDE (test code = 2215) 101 MEQ/L                              

 

             CARBON DIOXIDE (test code = 23 MEQ/L                               



             )                                               

 

             CALCIUM (test code = 2209) 9.1 MG/DL                              

 

             PROTEIN, TOTAL (test code = 7.0 G/DL                               



             )                                               

 

             ALBUMIN (test code = 2201) 4.7 G/DL                               

 

             CALC GLOBULIN (test code = 2.3 G/DL                               



             )                                               

 

             CALC A/G RATIO (test code = 2.0 RATIO                              



             2234)                                               

 

             BILIRUBIN, TOTAL (test code = 0.6 MG/DL                            

  



             )                                               

 

             ALKALINE PHOSPHATASE (test 65 U/L                                 



             code = 2204)                                        

 

             AST (test code = 2218) 19 U/L                                 

 

             ALT (test code = 2219) 19 U/L                                 



COMPREHENSIVE METABOLIC [id number] 00:00:00





             Test Item    Value        Reference Range Interpretation Comments

 

             GLUCOSE (test code = 2217) 99 MG/DL                               

 

             BUN (test code = 2208) 23 MG/DL                               

 

             CREATININE (test code = 2214) 1.10 MG/DL                           

  

 

             eGFR ( CKD-EPI) (test code 79 ML/MIN/1.73                      

     



             = 50147)                                            

 

             CALC BUN/CREAT (test code = 21 RATIO                               



             2235)                                               

 

             SODIUM (test code = 2231) 138 MEQ/L                              

 

             POTASSIUM (test code = 2228) 4.4 MEQ/L                             

 

 

             CHLORIDE (test code = 2215) 101 MEQ/L                              

 

             CARBON DIOXIDE (test code = 23 MEQ/L                               



             )                                               

 

             CALCIUM (test code = 2209) 9.1 MG/DL                              

 

             PROTEIN, TOTAL (test code = 7.0 G/DL                               



             )                                               

 

             ALBUMIN (test code = 2201) 4.7 G/DL                               

 

             CALC GLOBULIN (test code = 2.3 G/DL                               



             )                                               

 

             CALC A/G RATIO (test code = 2.0 RATIO                              



             2234)                                               

 

             BILIRUBIN, TOTAL (test code = 0.6 MG/DL                            

  



             )                                               

 

             ALKALINE PHOSPHATASE (test 65 U/L                                 



             code = 2204)                                        

 

             AST (test code = 2218) 19 U/L                                 

 

             ALT (test code = 2219) 19 U/L                                 



LIPID [id number] 00:00:00





             Test Item    Value        Reference Range Interpretation Comments

 

             CHOLESTEROL (test code = 2210) 211 MG/DL                           

   

 

             TRIGLYCERIDES (test code = 2232) 81 MG/DL                          

     

 

             HDL CHOLESTEROL (test code = 2220) 43 MG/DL                        

       

 

             CALC LDL CHOL (test code = 2237) 150 MG/DL                         

     

 

             RISK RATIO LDL/HDL (test code = 3.49 RATIO                         

    



             2238)                                               



LIPID [id number] 00:00:00





             Test Item    Value        Reference Range Interpretation Comments

 

             CHOLESTEROL (test code = 2210) 211 MG/DL                           

   

 

             TRIGLYCERIDES (test code = 2232) 81 MG/DL                          

     

 

             HDL CHOLESTEROL (test code = 2220) 43 MG/DL                        

       

 

             CALC LDL CHOL (test code = 2237) 150 MG/DL                         

     

 

             RISK RATIO LDL/HDL (test code = 3.49 RATIO                         

    



             2238)                                               



COMPREHENSIVE METABOLIC [id number] 00:00:00





             Test Item    Value        Reference Range Interpretation Comments

 

             GLUCOSE (test code = 2217) 99 MG/DL                               

 

             BUN (test code = 2208) 23 MG/DL                               

 

             CREATININE (test code = 2214) 1.10 MG/DL                           

  

 

             eGFR ( CKD-EPI) (test code 79 ML/MIN/1.73                      

     



             = 31955)                                            

 

             CALC BUN/CREAT (test code = 21 RATIO                               



             2235)                                               

 

             SODIUM (test code = 2231) 138 MEQ/L                              

 

             POTASSIUM (test code = 2228) 4.4 MEQ/L                             

 

 

             CHLORIDE (test code = 2215) 101 MEQ/L                              

 

             CARBON DIOXIDE (test code = 23 MEQ/L                               



             )                                               

 

             CALCIUM (test code = 2209) 9.1 MG/DL                              

 

             PROTEIN, TOTAL (test code = 7.0 G/DL                               



             )                                               

 

             ALBUMIN (test code = 2201) 4.7 G/DL                               

 

             CALC GLOBULIN (test code = 2.3 G/DL                               



             0)                                               

 

             CALC A/G RATIO (test code = 2.0 RATIO                              



             2234)                                               

 

             BILIRUBIN, TOTAL (test code = 0.6 MG/DL                            

  



             )                                               

 

             ALKALINE PHOSPHATASE (test 65 U/L                                 



             code = 2204)                                        

 

             AST (test code = 2218) 19 U/L                                 

 

             ALT (test code = 2219) 19 U/L                                 



COMPREHENSIVE METABOLIC [id number] 00:00:00





             Test Item    Value        Reference Range Interpretation Comments

 

             GLUCOSE (test code = 2217) 99 MG/DL                               

 

             BUN (test code = 2208) 23 MG/DL                               

 

             CREATININE (test code = 2214) 1.10 MG/DL                           

  

 

             eGFR ( CKD-EPI) (test code 79 ML/MIN/1.73                      

     



             = 59264)                                            

 

             CALC BUN/CREAT (test code = 21 RATIO                               



             2235)                                               

 

             SODIUM (test code = 2231) 138 MEQ/L                              

 

             POTASSIUM (test code = 2228) 4.4 MEQ/L                             

 

 

             CHLORIDE (test code = 2215) 101 MEQ/L                              

 

             CARBON DIOXIDE (test code = 23 MEQ/L                               



             )                                               

 

             CALCIUM (test code = 2209) 9.1 MG/DL                              

 

             PROTEIN, TOTAL (test code = 7.0 G/DL                               



             )                                               

 

             ALBUMIN (test code = 2201) 4.7 G/DL                               

 

             CALC GLOBULIN (test code = 2.3 G/DL                               



             )                                               

 

             CALC A/G RATIO (test code = 2.0 RATIO                              



             )                                               

 

             BILIRUBIN, TOTAL (test code = 0.6 MG/DL                            

  



             )                                               

 

             ALKALINE PHOSPHATASE (test 65 U/L                                 



             code = 2204)                                        

 

             AST (test code = 2218) 19 U/L                                 

 

             ALT (test code = 2219) 19 U/L                                 



LIPID [id number] 00:00:00





             Test Item    Value        Reference Range Interpretation Comments

 

             CHOLESTEROL (test code = 2210) 211 MG/DL                           

   

 

             TRIGLYCERIDES (test code = 2232) 81 MG/DL                          

     

 

             HDL CHOLESTEROL (test code = 2220) 43 MG/DL                        

       

 

             CALC LDL CHOL (test code = 2237) 150 MG/DL                         

     

 

             RISK RATIO LDL/HDL (test code = 3.49 RATIO                         

    



             2238)                                               



LIPID [id number] 00:00:00





             Test Item    Value        Reference Range Interpretation Comments

 

             CHOLESTEROL (test code = 2210) 211 MG/DL                           

   

 

             TRIGLYCERIDES (test code = 2232) 81 MG/DL                          

     

 

             HDL CHOLESTEROL (test code = 2220) 43 MG/DL                        

       

 

             CALC LDL CHOL (test code = 2237) 150 MG/DL                         

     

 

             RISK RATIO LDL/HDL (test code = 3.49 RATIO                         

    



             2238)                                               



COMPREHENSIVE METABOLIC [id number] 00:00:00





             Test Item    Value        Reference Range Interpretation Comments

 

             GLUCOSE (test code = 2217) 99 MG/DL                               

 

             BUN (test code = 2208) 23 MG/DL                               

 

             CREATININE (test code = 2214) 1.10 MG/DL                           

  

 

             eGFR ( CKD-EPI) (test code 79 ML/MIN/1.73                      

     



             = 01008)                                            

 

             CALC BUN/CREAT (test code = 21 RATIO                               



             2235)                                               

 

             SODIUM (test code = 2231) 138 MEQ/L                              

 

             POTASSIUM (test code = 2228) 4.4 MEQ/L                             

 

 

             CHLORIDE (test code = 2215) 101 MEQ/L                              

 

             CARBON DIOXIDE (test code = 23 MEQ/L                               



             )                                               

 

             CALCIUM (test code = 2209) 9.1 MG/DL                              

 

             PROTEIN, TOTAL (test code = 7.0 G/DL                               



             )                                               

 

             ALBUMIN (test code = 2201) 4.7 G/DL                               

 

             CALC GLOBULIN (test code = 2.3 G/DL                               



             )                                               

 

             CALC A/G RATIO (test code = 2.0 RATIO                              



             2234)                                               

 

             BILIRUBIN, TOTAL (test code = 0.6 MG/DL                            

  



             )                                               

 

             ALKALINE PHOSPHATASE (test 65 U/L                                 



             code = 2204)                                        

 

             AST (test code = 2218) 19 U/L                                 

 

             ALT (test code = 2219) 19 U/L                                 



LIPID [id number] 00:00:00





             Test Item    Value        Reference Range Interpretation Comments

 

             CHOLESTEROL (test code = 2210) 211 MG/DL                           

   

 

             TRIGLYCERIDES (test code = 2232) 81 MG/DL                          

     

 

             HDL CHOLESTEROL (test code = 2220) 43 MG/DL                        

       

 

             CALC LDL CHOL (test code = 2237) 150 MG/DL                         

     

 

             RISK RATIO LDL/HDL (test code = 3.49 RATIO                         

    



             2238)                                               



LIPID [id number] 00:00:00





             Test Item    Value        Reference Range Interpretation Comments

 

             CHOLESTEROL (test code = 2210) 211 MG/DL                           

   

 

             TRIGLYCERIDES (test code = 2232) 81 MG/DL                          

     

 

             HDL CHOLESTEROL (test code = 2220) 43 MG/DL                        

       

 

             CALC LDL CHOL (test code = 2237) 150 MG/DL                         

     

 

             RISK RATIO LDL/HDL (test code = 3.49 RATIO                         

    



             2238)                                               



COMPREHENSIVE METABOLIC [id number] 00:00:00





             Test Item    Value        Reference Range Interpretation Comments

 

             GLUCOSE (test code = 2217) 99 MG/DL                               

 

             BUN (test code = 2208) 23 MG/DL                               

 

             CREATININE (test code = 2214) 1.10 MG/DL                           

  

 

             eGFR ( CKD-EPI) (test code 79 ML/MIN/1.73                      

     



             = 10264)                                            

 

             CALC BUN/CREAT (test code = 21 RATIO                               



             2235)                                               

 

             SODIUM (test code = 2231) 138 MEQ/L                              

 

             POTASSIUM (test code = 2228) 4.4 MEQ/L                             

 

 

             CHLORIDE (test code = 2215) 101 MEQ/L                              

 

             CARBON DIOXIDE (test code = 23 MEQ/L                               



             )                                               

 

             CALCIUM (test code = 2209) 9.1 MG/DL                              

 

             PROTEIN, TOTAL (test code = 7.0 G/DL                               



             )                                               

 

             ALBUMIN (test code = 2201) 4.7 G/DL                               

 

             CALC GLOBULIN (test code = 2.3 G/DL                               



             2240)                                               

 

             CALC A/G RATIO (test code = 2.0 RATIO                              



             2234)                                               

 

             BILIRUBIN, TOTAL (test code = 0.6 MG/DL                            

  



             )                                               

 

             ALKALINE PHOSPHATASE (test 65 U/L                                 



             code = 2204)                                        

 

             AST (test code = 2218) 19 U/L                                 

 

             ALT (test code = 2219) 19 U/L                                 



COMPREHENSIVE METABOLIC [id number] 00:00:00





             Test Item    Value        Reference Range Interpretation Comments

 

             GLUCOSE (test code = 2217) 99 MG/DL                               

 

             BUN (test code = 2208) 23 MG/DL                               

 

             CREATININE (test code = 2214) 1.10 MG/DL                           

  

 

             eGFR ( CKD-EPI) (test code 79 ML/MIN/1.73                      

     



             = 94498)                                            

 

             CALC BUN/CREAT (test code = 21 RATIO                               



             2235)                                               

 

             SODIUM (test code = 2231) 138 MEQ/L                              

 

             POTASSIUM (test code = 2228) 4.4 MEQ/L                             

 

 

             CHLORIDE (test code = 2215) 101 MEQ/L                              

 

             CARBON DIOXIDE (test code = 23 MEQ/L                               



             )                                               

 

             CALCIUM (test code = 2209) 9.1 MG/DL                              

 

             PROTEIN, TOTAL (test code = 7.0 G/DL                               



             )                                               

 

             ALBUMIN (test code = 2201) 4.7 G/DL                               

 

             CALC GLOBULIN (test code = 2.3 G/DL                               



             2240)                                               

 

             CALC A/G RATIO (test code = 2.0 RATIO                              



             2234)                                               

 

             BILIRUBIN, TOTAL (test code = 0.6 MG/DL                            

  



             )                                               

 

             ALKALINE PHOSPHATASE (test 65 U/L                                 



             code = 2204)                                        

 

             AST (test code = 2218) 19 U/L                                 

 

             ALT (test code = 2219) 19 U/L                                 



CBC W/AUTO DIFF WITH [id number] 03:58:08





             Test Item    Value        Reference Range Interpretation Comments

 

             WBC (test code = 4.5 K/UL     3.5-11.0                  



             1001)                                               

 

             RBC (test code = 4.71 M/UL    4.50-6.10                 



             1002)                                               

 

             HEMOGLOBIN (test code 14.6 G/DL    13.5-17.0                 



             = 1003)                                             

 

             HEMATOCRIT (test code 44.6 %       40.0-51.0                 



             = 1004)                                             

 

             MCV (test code = 94.7 fL      80.0-99.0                 



             1005)                                               

 

             MCH (test code = 31.0 PG      25.0-33.0                 



             1006)                                               

 

             MCHC (test code = 32.7 G/DL    31.0-36.0                 



             1007)                                               

 

             RDW (test code = 13.5 %       11.5-15.0                 



             1038)                                               

 

             NEUTROPHILS (test 61.8 %                                 



             code = 1008)                                        

 

             LYMPHOCYTES (test 27.4 %                                 



             code = 1010)                                        

 

             MONOCYTES (test code 7.7 %                                  



             = 1011)                                             

 

             EOSINOPHILS (test 2.0 %                                  



             code = 1012)                                        

 

             BASOPHILS (test code 0.9 %                                  



             = 1013)                                             

 

             IMMATURE GRANULOCYTES 0.2 %                                  



             (test code = 1036)                                        

 

             NUCLEATED RBCS (test 0.0 /100 WBC'S See_Comment                [Aut

omated



             code = 1065)                                        message] The sy

stem



                                                                 which generated



                                                                 this result



                                                                 transmitted



                                                                 reference range

:



                                                                 0.0. The refere

nce



                                                                 range was not u

sed



                                                                 to interpret th

is



                                                                 result as



                                                                 normal/abnormal

.

 

             PLATELET COUNT (test 140 K/UL     130-400                   



             code = 1015)                                        

 

             ABSOLUTE NEUTROPHILS 2.79 K/UL    1.50-7.50                 



             (test code = 1066)                                        

 

             ABSOLUTE LYMPHOCYTES 1.24 K/UL    1.00-4.00                 



             (test code = 1067)                                        

 

             ABSOLUTE MONOCYTES 0.35 K/UL    0.20-1.00                 



             (test code = 1068)                                        

 

             ABSOLUTE EOSINOPHILS 0.09 K/UL    0.00-0.50                 



             (test code = 1040)                                        

 

             ABSOLUTE BASOPHILS 0.04 K/UL    0.00-0.20                 



             (test code = 1069)                                        

 

             ABS IMMATURE 0.01 K/UL    0.00-0.10                 



             GRANULOCYTES (test                                        



             code = 1020)                                        

 

             ABS NUCLEATED RBCS 0.02 K/UL    0.00-0.11                 



             (test code = 29656)                                        



CBC W/AUTO [id number] 00:00:00





             Test Item    Value        Reference Range Interpretation Comments

 

             WBC (test code = 1001) 4.5 K/UL                               

 

             RBC (test code = 1002) 4.71 M/UL                              

 

             HEMOGLOBIN (test code = 1003) 14.6 G/DL                            

  

 

             HEMATOCRIT (test code = 1004) 44.6 %                               

  

 

             MCV (test code = 1005) 94.7 fL                                

 

             MCH (test code = 1006) 31.0 PG                                

 

             MCHC (test code = 1007) 32.7 G/DL                              

 

             RDW (test code = 1038) 13.5 %                                 

 

             NEUTROPHILS (test code = 1008) 61.8 %                              

   

 

             LYMPHOCYTES (test code = 1010) 27.4 %                              

   

 

             MONOCYTES (test code = 1011) 7.7 %                                 

 

 

             EOSINOPHILS (test code = 1012) 2.0 %                               

   

 

             BASOPHILS (test code = 1013) 0.9 %                                 

 

 

             IMMATURE GRANULOCYTES (test 0.2 %                                  



             code = 1036)                                        

 

             NUCLEATED RBCS (test code = 0.0 /100WBC'S                          

 



             1065)                                               

 

             PLATELET COUNT (test code = 140 K/UL                               



             1015)                                               

 

             ABSOLUTE NEUTROPHILS (test code 2.79 K/UL                          

    



             = 1066)                                             

 

             ABSOLUTE LYMPHOCYTES (test code 1.24 K/UL                          

    



             = 1067)                                             

 

             ABSOLUTE MONOCYTES (test code = 0.35 K/UL                          

    



             1068)                                               

 

             ABSOLUTE EOSINOPHILS (test code 0.09 K/UL                          

    



             = 1040)                                             

 

             ABSOLUTE BASOPHILS (test code = 0.04 K/UL                          

    



             1069)                                               

 

             ABS IMMATURE GRANULOCYTES (test 0.01 K/UL                          

    



             code = 1020)                                        

 

             ABS NUCLEATED RBCS (test code = 0.02 K/UL                          

    



             90415)                                              



CBC W/AUTO [id number] 00:00:00





             Test Item    Value        Reference Range Interpretation Comments

 

             WBC (test code = 1001) 4.5 K/UL                               

 

             RBC (test code = 1002) 4.71 M/UL                              

 

             HEMOGLOBIN (test code = 1003) 14.6 G/DL                            

  

 

             HEMATOCRIT (test code = 1004) 44.6 %                               

  

 

             MCV (test code = 1005) 94.7 fL                                

 

             MCH (test code = 1006) 31.0 PG                                

 

             MCHC (test code = 1007) 32.7 G/DL                              

 

             RDW (test code = 1038) 13.5 %                                 

 

             NEUTROPHILS (test code = 1008) 61.8 %                              

   

 

             LYMPHOCYTES (test code = 1010) 27.4 %                              

   

 

             MONOCYTES (test code = 1011) 7.7 %                                 

 

 

             EOSINOPHILS (test code = 1012) 2.0 %                               

   

 

             BASOPHILS (test code = 1013) 0.9 %                                 

 

 

             IMMATURE GRANULOCYTES (test 0.2 %                                  



             code = 1036)                                        

 

             NUCLEATED RBCS (test code = 0.0 /100WBC'S                          

 



             1065)                                               

 

             PLATELET COUNT (test code = 140 K/UL                               



             1015)                                               

 

             ABSOLUTE NEUTROPHILS (test code 2.79 K/UL                          

    



             = 1066)                                             

 

             ABSOLUTE LYMPHOCYTES (test code 1.24 K/UL                          

    



             = 1067)                                             

 

             ABSOLUTE MONOCYTES (test code = 0.35 K/UL                          

    



             1068)                                               

 

             ABSOLUTE EOSINOPHILS (test code 0.09 K/UL                          

    



             = 1040)                                             

 

             ABSOLUTE BASOPHILS (test code = 0.04 K/UL                          

    



             1069)                                               

 

             ABS IMMATURE GRANULOCYTES (test 0.01 K/UL                          

    



             code = 1020)                                        

 

             ABS NUCLEATED RBCS (test code = 0.02 K/UL                          

    



             42180)                                              



CBC W/AUTO [id number] 00:00:00





             Test Item    Value        Reference Range Interpretation Comments

 

             WBC (test code = 1001) 4.5 K/UL                               

 

             RBC (test code = 1002) 4.71 M/UL                              

 

             HEMOGLOBIN (test code = 1003) 14.6 G/DL                            

  

 

             HEMATOCRIT (test code = 1004) 44.6 %                               

  

 

             MCV (test code = 1005) 94.7 fL                                

 

             MCH (test code = 1006) 31.0 PG                                

 

             MCHC (test code = 1007) 32.7 G/DL                              

 

             RDW (test code = 1038) 13.5 %                                 

 

             NEUTROPHILS (test code = 1008) 61.8 %                              

   

 

             LYMPHOCYTES (test code = 1010) 27.4 %                              

   

 

             MONOCYTES (test code = 1011) 7.7 %                                 

 

 

             EOSINOPHILS (test code = 1012) 2.0 %                               

   

 

             BASOPHILS (test code = 1013) 0.9 %                                 

 

 

             IMMATURE GRANULOCYTES (test 0.2 %                                  



             code = 1036)                                        

 

             NUCLEATED RBCS (test code = 0.0 /100WBC'S                          

 



             1065)                                               

 

             PLATELET COUNT (test code = 140 K/UL                               



             1015)                                               

 

             ABSOLUTE NEUTROPHILS (test code 2.79 K/UL                          

    



             = 1066)                                             

 

             ABSOLUTE LYMPHOCYTES (test code 1.24 K/UL                          

    



             = 1067)                                             

 

             ABSOLUTE MONOCYTES (test code = 0.35 K/UL                          

    



             1068)                                               

 

             ABSOLUTE EOSINOPHILS (test code 0.09 K/UL                          

    



             = 1040)                                             

 

             ABSOLUTE BASOPHILS (test code = 0.04 K/UL                          

    



             1069)                                               

 

             ABS IMMATURE GRANULOCYTES (test 0.01 K/UL                          

    



             code = 1020)                                        

 

             ABS NUCLEATED RBCS (test code = 0.02 K/UL                          

    



             05001)                                              



CBC W/AUTO [id number] 00:00:00





             Test Item    Value        Reference Range Interpretation Comments

 

             WBC (test code = 1001) 4.5 K/UL                               

 

             RBC (test code = 1002) 4.71 M/UL                              

 

             HEMOGLOBIN (test code = 1003) 14.6 G/DL                            

  

 

             HEMATOCRIT (test code = 1004) 44.6 %                               

  

 

             MCV (test code = 1005) 94.7 fL                                

 

             MCH (test code = 1006) 31.0 PG                                

 

             MCHC (test code = 1007) 32.7 G/DL                              

 

             RDW (test code = 1038) 13.5 %                                 

 

             NEUTROPHILS (test code = 1008) 61.8 %                              

   

 

             LYMPHOCYTES (test code = 1010) 27.4 %                              

   

 

             MONOCYTES (test code = 1011) 7.7 %                                 

 

 

             EOSINOPHILS (test code = 1012) 2.0 %                               

   

 

             BASOPHILS (test code = 1013) 0.9 %                                 

 

 

             IMMATURE GRANULOCYTES (test 0.2 %                                  



             code = 1036)                                        

 

             NUCLEATED RBCS (test code = 0.0 /100WBC'S                          

 



             1065)                                               

 

             PLATELET COUNT (test code = 140 K/UL                               



             1015)                                               

 

             ABSOLUTE NEUTROPHILS (test code 2.79 K/UL                          

    



             = 1066)                                             

 

             ABSOLUTE LYMPHOCYTES (test code 1.24 K/UL                          

    



             = 1067)                                             

 

             ABSOLUTE MONOCYTES (test code = 0.35 K/UL                          

    



             1068)                                               

 

             ABSOLUTE EOSINOPHILS (test code 0.09 K/UL                          

    



             = 1040)                                             

 

             ABSOLUTE BASOPHILS (test code = 0.04 K/UL                          

    



             1069)                                               

 

             ABS IMMATURE GRANULOCYTES (test 0.01 K/UL                          

    



             code = 1020)                                        

 

             ABS NUCLEATED RBCS (test code = 0.02 K/UL                          

    



             81209)                                              



CBC W/AUTO [id number] 00:00:00





             Test Item    Value        Reference Range Interpretation Comments

 

             WBC (test code = 1001) 4.5 K/UL                               

 

             RBC (test code = 1002) 4.71 M/UL                              

 

             HEMOGLOBIN (test code = 1003) 14.6 G/DL                            

  

 

             HEMATOCRIT (test code = 1004) 44.6 %                               

  

 

             MCV (test code = 1005) 94.7 fL                                

 

             MCH (test code = 1006) 31.0 PG                                

 

             MCHC (test code = 1007) 32.7 G/DL                              

 

             RDW (test code = 1038) 13.5 %                                 

 

             NEUTROPHILS (test code = 1008) 61.8 %                              

   

 

             LYMPHOCYTES (test code = 1010) 27.4 %                              

   

 

             MONOCYTES (test code = 1011) 7.7 %                                 

 

 

             EOSINOPHILS (test code = 1012) 2.0 %                               

   

 

             BASOPHILS (test code = 1013) 0.9 %                                 

 

 

             IMMATURE GRANULOCYTES (test 0.2 %                                  



             code = 1036)                                        

 

             NUCLEATED RBCS (test code = 0.0 /100WBC'S                          

 



             1065)                                               

 

             PLATELET COUNT (test code = 140 K/UL                               



             1015)                                               

 

             ABSOLUTE NEUTROPHILS (test code 2.79 K/UL                          

    



             = 1066)                                             

 

             ABSOLUTE LYMPHOCYTES (test code 1.24 K/UL                          

    



             = 1067)                                             

 

             ABSOLUTE MONOCYTES (test code = 0.35 K/UL                          

    



             1068)                                               

 

             ABSOLUTE EOSINOPHILS (test code 0.09 K/UL                          

    



             = 1040)                                             

 

             ABSOLUTE BASOPHILS (test code = 0.04 K/UL                          

    



             1069)                                               

 

             ABS IMMATURE GRANULOCYTES (test 0.01 K/UL                          

    



             code = 1020)                                        

 

             ABS NUCLEATED RBCS (test code = 0.02 K/UL                          

    



             81777)                                              



CBC W/AUTO [id number] 00:00:00





             Test Item    Value        Reference Range Interpretation Comments

 

             WBC (test code = 1001) 4.5 K/UL                               

 

             RBC (test code = 1002) 4.71 M/UL                              

 

             HEMOGLOBIN (test code = 1003) 14.6 G/DL                            

  

 

             HEMATOCRIT (test code = 1004) 44.6 %                               

  

 

             MCV (test code = 1005) 94.7 fL                                

 

             MCH (test code = 1006) 31.0 PG                                

 

             MCHC (test code = 1007) 32.7 G/DL                              

 

             RDW (test code = 1038) 13.5 %                                 

 

             NEUTROPHILS (test code = 1008) 61.8 %                              

   

 

             LYMPHOCYTES (test code = 1010) 27.4 %                              

   

 

             MONOCYTES (test code = 1011) 7.7 %                                 

 

 

             EOSINOPHILS (test code = 1012) 2.0 %                               

   

 

             BASOPHILS (test code = 1013) 0.9 %                                 

 

 

             IMMATURE GRANULOCYTES (test 0.2 %                                  



             code = 1036)                                        

 

             NUCLEATED RBCS (test code = 0.0 /100WBC'S                          

 



             1065)                                               

 

             PLATELET COUNT (test code = 140 K/UL                               



             1015)                                               

 

             ABSOLUTE NEUTROPHILS (test code 2.79 K/UL                          

    



             = 1066)                                             

 

             ABSOLUTE LYMPHOCYTES (test code 1.24 K/UL                          

    



             = 1067)                                             

 

             ABSOLUTE MONOCYTES (test code = 0.35 K/UL                          

    



             1068)                                               

 

             ABSOLUTE EOSINOPHILS (test code 0.09 K/UL                          

    



             = 1040)                                             

 

             ABSOLUTE BASOPHILS (test code = 0.04 K/UL                          

    



             1069)                                               

 

             ABS IMMATURE GRANULOCYTES (test 0.01 K/UL                          

    



             code = 1020)                                        

 

             ABS NUCLEATED RBCS (test code = 0.02 K/UL                          

    



             45638)                                              



CBC W/AUTO [id number] 00:00:00





             Test Item    Value        Reference Range Interpretation Comments

 

             WBC (test code = 1001) 4.5 K/UL                               

 

             RBC (test code = 1002) 4.71 M/UL                              

 

             HEMOGLOBIN (test code = 1003) 14.6 G/DL                            

  

 

             HEMATOCRIT (test code = 1004) 44.6 %                               

  

 

             MCV (test code = 1005) 94.7 fL                                

 

             MCH (test code = 1006) 31.0 PG                                

 

             MCHC (test code = 1007) 32.7 G/DL                              

 

             RDW (test code = 1038) 13.5 %                                 

 

             NEUTROPHILS (test code = 1008) 61.8 %                              

   

 

             LYMPHOCYTES (test code = 1010) 27.4 %                              

   

 

             MONOCYTES (test code = 1011) 7.7 %                                 

 

 

             EOSINOPHILS (test code = 1012) 2.0 %                               

   

 

             BASOPHILS (test code = 1013) 0.9 %                                 

 

 

             IMMATURE GRANULOCYTES (test 0.2 %                                  



             code = 1036)                                        

 

             NUCLEATED RBCS (test code = 0.0 /100WBC'S                          

 



             1065)                                               

 

             PLATELET COUNT (test code = 140 K/UL                               



             1015)                                               

 

             ABSOLUTE NEUTROPHILS (test code 2.79 K/UL                          

    



             = 1066)                                             

 

             ABSOLUTE LYMPHOCYTES (test code 1.24 K/UL                          

    



             = 1067)                                             

 

             ABSOLUTE MONOCYTES (test code = 0.35 K/UL                          

    



             1068)                                               

 

             ABSOLUTE EOSINOPHILS (test code 0.09 K/UL                          

    



             = 1040)                                             

 

             ABSOLUTE BASOPHILS (test code = 0.04 K/UL                          

    



             1069)                                               

 

             ABS IMMATURE GRANULOCYTES (test 0.01 K/UL                          

    



             code = 1020)                                        

 

             ABS NUCLEATED RBCS (test code = 0.02 K/UL                          

    



             21870)                                              



CBC W/AUTO [id number] 00:00:00





             Test Item    Value        Reference Range Interpretation Comments

 

             WBC (test code = 1001) 4.5 K/UL                               

 

             RBC (test code = 1002) 4.71 M/UL                              

 

             HEMOGLOBIN (test code = 1003) 14.6 G/DL                            

  

 

             HEMATOCRIT (test code = 1004) 44.6 %                               

  

 

             MCV (test code = 1005) 94.7 fL                                

 

             MCH (test code = 1006) 31.0 PG                                

 

             MCHC (test code = 1007) 32.7 G/DL                              

 

             RDW (test code = 1038) 13.5 %                                 

 

             NEUTROPHILS (test code = 1008) 61.8 %                              

   

 

             LYMPHOCYTES (test code = 1010) 27.4 %                              

   

 

             MONOCYTES (test code = 1011) 7.7 %                                 

 

 

             EOSINOPHILS (test code = 1012) 2.0 %                               

   

 

             BASOPHILS (test code = 1013) 0.9 %                                 

 

 

             IMMATURE GRANULOCYTES (test 0.2 %                                  



             code = 1036)                                        

 

             NUCLEATED RBCS (test code = 0.0 /100WBC'S                          

 



             1065)                                               

 

             PLATELET COUNT (test code = 140 K/UL                               



             1015)                                               

 

             ABSOLUTE NEUTROPHILS (test code 2.79 K/UL                          

    



             = 1066)                                             

 

             ABSOLUTE LYMPHOCYTES (test code 1.24 K/UL                          

    



             = 1067)                                             

 

             ABSOLUTE MONOCYTES (test code = 0.35 K/UL                          

    



             1068)                                               

 

             ABSOLUTE EOSINOPHILS (test code 0.09 K/UL                          

    



             = 1040)                                             

 

             ABSOLUTE BASOPHILS (test code = 0.04 K/UL                          

    



             1069)                                               

 

             ABS IMMATURE GRANULOCYTES (test 0.01 K/UL                          

    



             code = 1020)                                        

 

             ABS NUCLEATED RBCS (test code = 0.02 K/UL                          

    



             12492)                                              



CBC W/AUTO [id number] 00:00:00





             Test Item    Value        Reference Range Interpretation Comments

 

             WBC (test code = 1001) 4.5 K/UL                               

 

             RBC (test code = 1002) 4.71 M/UL                              

 

             HEMOGLOBIN (test code = 1003) 14.6 G/DL                            

  

 

             HEMATOCRIT (test code = 1004) 44.6 %                               

  

 

             MCV (test code = 1005) 94.7 fL                                

 

             MCH (test code = 1006) 31.0 PG                                

 

             MCHC (test code = 1007) 32.7 G/DL                              

 

             RDW (test code = 1038) 13.5 %                                 

 

             NEUTROPHILS (test code = 1008) 61.8 %                              

   

 

             LYMPHOCYTES (test code = 1010) 27.4 %                              

   

 

             MONOCYTES (test code = 1011) 7.7 %                                 

 

 

             EOSINOPHILS (test code = 1012) 2.0 %                               

   

 

             BASOPHILS (test code = 1013) 0.9 %                                 

 

 

             IMMATURE GRANULOCYTES (test 0.2 %                                  



             code = 1036)                                        

 

             NUCLEATED RBCS (test code = 0.0 /100WBC'S                          

 



             1065)                                               

 

             PLATELET COUNT (test code = 140 K/UL                               



             1015)                                               

 

             ABSOLUTE NEUTROPHILS (test code 2.79 K/UL                          

    



             = 1066)                                             

 

             ABSOLUTE LYMPHOCYTES (test code 1.24 K/UL                          

    



             = 1067)                                             

 

             ABSOLUTE MONOCYTES (test code = 0.35 K/UL                          

    



             1068)                                               

 

             ABSOLUTE EOSINOPHILS (test code 0.09 K/UL                          

    



             = 1040)                                             

 

             ABSOLUTE BASOPHILS (test code = 0.04 K/UL                          

    



             1069)                                               

 

             ABS IMMATURE GRANULOCYTES (test 0.01 K/UL                          

    



             code = 1020)                                        

 

             ABS NUCLEATED RBCS (test code = 0.02 K/UL                          

    



             08845)                                              



CBC W/AUTO [id number] 00:00:00





             Test Item    Value        Reference Range Interpretation Comments

 

             WBC (test code = 1001) 4.5 K/UL                               

 

             RBC (test code = 1002) 4.71 M/UL                              

 

             HEMOGLOBIN (test code = 1003) 14.6 G/DL                            

  

 

             HEMATOCRIT (test code = 1004) 44.6 %                               

  

 

             MCV (test code = 1005) 94.7 fL                                

 

             MCH (test code = 1006) 31.0 PG                                

 

             MCHC (test code = 1007) 32.7 G/DL                              

 

             RDW (test code = 1038) 13.5 %                                 

 

             NEUTROPHILS (test code = 1008) 61.8 %                              

   

 

             LYMPHOCYTES (test code = 1010) 27.4 %                              

   

 

             MONOCYTES (test code = 1011) 7.7 %                                 

 

 

             EOSINOPHILS (test code = 1012) 2.0 %                               

   

 

             BASOPHILS (test code = 1013) 0.9 %                                 

 

 

             IMMATURE GRANULOCYTES (test 0.2 %                                  



             code = 1036)                                        

 

             NUCLEATED RBCS (test code = 0.0 /100WBC'S                          

 



             1065)                                               

 

             PLATELET COUNT (test code = 140 K/UL                               



             1015)                                               

 

             ABSOLUTE NEUTROPHILS (test code 2.79 K/UL                          

    



             = 1066)                                             

 

             ABSOLUTE LYMPHOCYTES (test code 1.24 K/UL                          

    



             = 1067)                                             

 

             ABSOLUTE MONOCYTES (test code = 0.35 K/UL                          

    



             1068)                                               

 

             ABSOLUTE EOSINOPHILS (test code 0.09 K/UL                          

    



             = 1040)                                             

 

             ABSOLUTE BASOPHILS (test code = 0.04 K/UL                          

    



             1069)                                               

 

             ABS IMMATURE GRANULOCYTES (test 0.01 K/UL                          

    



             code = 1020)                                        

 

             ABS NUCLEATED RBCS (test code = 0.02 K/UL                          

    



             63091)                                              



CBC W/AUTO [id number] 00:00:00





             Test Item    Value        Reference Range Interpretation Comments

 

             WBC (test code = 1001) 4.5 K/UL                               

 

             RBC (test code = 1002) 4.71 M/UL                              

 

             HEMOGLOBIN (test code = 1003) 14.6 G/DL                            

  

 

             HEMATOCRIT (test code = 1004) 44.6 %                               

  

 

             MCV (test code = 1005) 94.7 fL                                

 

             MCH (test code = 1006) 31.0 PG                                

 

             MCHC (test code = 1007) 32.7 G/DL                              

 

             RDW (test code = 1038) 13.5 %                                 

 

             NEUTROPHILS (test code = 1008) 61.8 %                              

   

 

             LYMPHOCYTES (test code = 1010) 27.4 %                              

   

 

             MONOCYTES (test code = 1011) 7.7 %                                 

 

 

             EOSINOPHILS (test code = 1012) 2.0 %                               

   

 

             BASOPHILS (test code = 1013) 0.9 %                                 

 

 

             IMMATURE GRANULOCYTES (test 0.2 %                                  



             code = 1036)                                        

 

             NUCLEATED RBCS (test code = 0.0 /100WBC'S                          

 



             1065)                                               

 

             PLATELET COUNT (test code = 140 K/UL                               



             1015)                                               

 

             ABSOLUTE NEUTROPHILS (test code 2.79 K/UL                          

    



             = 1066)                                             

 

             ABSOLUTE LYMPHOCYTES (test code 1.24 K/UL                          

    



             = 1067)                                             

 

             ABSOLUTE MONOCYTES (test code = 0.35 K/UL                          

    



             1068)                                               

 

             ABSOLUTE EOSINOPHILS (test code 0.09 K/UL                          

    



             = 1040)                                             

 

             ABSOLUTE BASOPHILS (test code = 0.04 K/UL                          

    



             1069)                                               

 

             ABS IMMATURE GRANULOCYTES (test 0.01 K/UL                          

    



             code = 1020)                                        

 

             ABS NUCLEATED RBCS (test code = 0.02 K/UL                          

    



             52045)                                              



CBC W/AUTO [id number] 00:00:00





             Test Item    Value        Reference Range Interpretation Comments

 

             WBC (test code = 1001) 4.5 K/UL                               

 

             RBC (test code = 1002) 4.71 M/UL                              

 

             HEMOGLOBIN (test code = 1003) 14.6 G/DL                            

  

 

             HEMATOCRIT (test code = 1004) 44.6 %                               

  

 

             MCV (test code = 1005) 94.7 fL                                

 

             MCH (test code = 1006) 31.0 PG                                

 

             MCHC (test code = 1007) 32.7 G/DL                              

 

             RDW (test code = 1038) 13.5 %                                 

 

             NEUTROPHILS (test code = 1008) 61.8 %                              

   

 

             LYMPHOCYTES (test code = 1010) 27.4 %                              

   

 

             MONOCYTES (test code = 1011) 7.7 %                                 

 

 

             EOSINOPHILS (test code = 1012) 2.0 %                               

   

 

             BASOPHILS (test code = 1013) 0.9 %                                 

 

 

             IMMATURE GRANULOCYTES (test 0.2 %                                  



             code = 1036)                                        

 

             NUCLEATED RBCS (test code = 0.0 /100WBC'S                          

 



             1065)                                               

 

             PLATELET COUNT (test code = 140 K/UL                               



             1015)                                               

 

             ABSOLUTE NEUTROPHILS (test code 2.79 K/UL                          

    



             = 1066)                                             

 

             ABSOLUTE LYMPHOCYTES (test code 1.24 K/UL                          

    



             = 1067)                                             

 

             ABSOLUTE MONOCYTES (test code = 0.35 K/UL                          

    



             1068)                                               

 

             ABSOLUTE EOSINOPHILS (test code 0.09 K/UL                          

    



             = 1040)                                             

 

             ABSOLUTE BASOPHILS (test code = 0.04 K/UL                          

    



             1069)                                               

 

             ABS IMMATURE GRANULOCYTES (test 0.01 K/UL                          

    



             code = 1020)                                        

 

             ABS NUCLEATED RBCS (test code = 0.02 K/UL                          

    



             17555)                                              



CT, BRAIN, WITHOUT IV [id number] 12:50:00Unlisted Reason for Exam - 
Click Yes and Enter Reason Below-&gt;YesUnlisted Reason for Exam-&gt;[id number]
************************************************************[facility]UELLAR : 1965 Sex: 
M************************************************************FINAL REPORT 
PATIENT ID: [id number] EXAM: CT HEAD WITHOUT CONTRAST History: 55-year-old male 
presenting for follow-up of intracranial hemorrhage. Status post left parietal 
craniotomy.Comparison studies: Multiple studies were used comparison, the most 
recent brain CT from 2021. Technique: Axial images were obtained from the 
skull base to the vertex. Coronal and sagittal reconstructions obtained from the
axial data.Dose modulation, iterative reconstruction, and/or weight based 
adjustment of the mA/kV was utilized to reduce the radiation dose to as low as 
reasonably achievable. Findings: Skull/extra-axial spaces: Postoperative 
findings from left parietal craniotomy, with interval decrease in of left subdur
al hematoma and improved postoperative pneumocephalus. The residual mixed 
density left frontoparietal subdural hematoma measures 0.6 cm in maximum 
transverse dimension, and mildly effaces the left lateral frontal sulci. There 
has been interval resolution of right extra-axial hematoma and rightward midline
shift. No blastic or lytic lesions. Brain sulci: Appropriate for age.Ventricles:
Normal in size and configuration. No hydrocephalus. Parenchyma: No abnormal 
densities. No masses or acute/chronic cortical vascular insults. 
Sellar/suprasellar region: No abnormalitiesCraniocervical junction: Patent fo
ramen magnum. No Chiari one malformation. IMPRESSION: 1.Interval decrease in 
size of left residual subdural hematoma compared to head CT of 2021, 
complete resolution of previously seen right-sided midline shift. 2.Improved 
postoperative changes from left craniotomy and pneumocephalus. 3.Interval re
solution of the small right subdural hematoma. Signed: [facility] Samaniego [facility] 
Verified Date/Time: 2021 12:50:55 Electronically signed by: WENDY SAMANIEGO 
on 2021 12:50 PMCT Brain without IV [id number] 12:50:00Interface, 
External Ris In - 2021 12:53 PM CDTFINAL REPORT PATIENT ID: [id number] EXAM:
CT HEAD WITHOUT CONTRAST History: 55-year-old male presenting for follow-up of 
intracranial hemorrhage. Status post left parietal craniotomy.Comparison 
studies: Multiple studies were used comparison, the most recent brain CT from 
2021. Technique: Axial images were obtained from the skull base to the 
vertex. Coronal and sagittal reconstructions obtained from the axial data.Dose 
modulation, iterative reconstruction, and/or weight based adjustment of the 
mA/kV was utilized to reduce the radiation dose to aslow as reasonably 
achievable. Findings: Skull/extra-axial spaces: Postoperative findings from left
parietal craniotomy, with interval decrease in of left subdural hematoma and 
improved postoperative pneumocephalus. The residual mixed density left 
frontoparietal subdural hematoma measures 0.6 cm in maximum transverse 
dimension, and mildly effaces the left lateral frontal sulci. There has been 
intervalresolution of right extra-axial hematoma and rightward midline shift. No
blastic or lytic lesions. Brain sulci: Appropriate for age.Ventricles: Normal in
size and configuration. No hydrocephalus. Parenchyma: No abnormal densities. No 
masses or acute/chronic cortical vascular insults. Sellar/suprasellar region: No
abnormalitiesCraniocervical junction: Patent foramen magnum. No Chiari one 
malformation.IMPRESSION: 1.Interval decrease in size of left residual subdural 
hematoma compared to head CT of 2021, complete resolution of previously seen
right-sided midline shift. 2.Improved postoperative changes from left craniotomy
and pneumocephalus. 3.Interval resolution of the small right subdural hematoma. 
Signed: Wendy Samaniego [facility]ort Verified Date/Time: 2021 12:50:55 
Electronically signed by:WENDY SAMANIEGO on 2021 12:50 [facility] [id number] 07:20:00





             Test Item    Value        Reference Range Interpretation Comments

 

             Sodium (test code = 135 meq/L    136-145      L            



             2951-2)                                             

 

             Potassium (test code 4.0 meq/L    3.5-5.1                   



             = 2823-3)                                           

 

             Chloride (test code 102 meq/L                        



             = 2075-0)                                           

 

             CO2 (test code = 24 meq/L     -2028-9)                                             

 

             BUN (test code = 15 mg/dL     7-21                      



             3094-0)                                             

 

             Creatinine (test 0.94 mg/dL   0.57-1.25                 



             code = 2160-0)                                        

 

             Glucose (test code = 89 mg/dL                         



             2345-7)                                             

 

             Calcium (test code = 9.1 mg/dL    8.4-10.2                  



             12634-1)                                            

 

             EGFR (test code = 84           mL/min/1.73 sq              ESTIMATE

D GFR



             94668-1)                  m                         IS NOT AS



                                                                 ACCURATE AS



                                                                 CREATININE



                                                                 CLEARANCE IN



                                                                 PREDICTING



                                                                 GLOMERULAR



                                                                 FILTRATION



                                                                 RATE. ESTIMATED



                                                                 GFR IS NOT



                                                                 APPLICABLE FOR



                                                                 DIALYSIS



                                                                 PATIENTS.

 

             SCOOTER (test code =  ID -                           



             SCOOTER)         CAR[place] FOnce on                           



                          admission and Daily                           



                          AM afterwardsOnce on                           



                          admission and Daily                           



                          AM afterwards                           

 

             Lab Interpretation Abnormal                               



             (test code =                                        



             44425-0)                                            



[facility]





             Test Item    Value        Reference Range Interpretation Comments

 

             Magnesium (test code = 2.0 mg/dL    1.6-2.6                   



             53612-4)                                            

 

             SCOOTER (test code = SCOOTER)  ID - JOHN                       

    



                          FOnce on admission and                           



                          Daily AM                               



                          afterwardsOnce on                           



                          admission and Daily AM                           



                          afterwards                             

 

             Lab Interpretation Normal                                 



             (test code = 33643-2)                                        



[facility]





             Test Item    Value        Reference Range Interpretation Comments

 

             Phosphorus (test code = 3.7 mg/dL    2.3-4.7                   



             2777-1)                                             

 

             SCOOTER (test code = SCOOTER)  ID - CAROLINA                       

    



                          FOnce on admission and                           



                          Daily AM                               



                          afterwardsOnce on                           



                          admission and Daily AM                           



                          afterwards                             

 

             Lab Interpretation Normal                                 



             (test code = 44165-5)                                        



[facility] [id number] 07:20:00





             Test Item    Value        Reference Range Interpretation Comments

 

             SODIUM (BEAKER) 135 meq/L    136-145      L            



             (test code = 381)                                        

 

             POTASSIUM (BEAKER) 4.0 meq/L    3.5-5.1                   



             (test code = 379)                                        

 

             CHLORIDE (BEAKER) 102 meq/L                        



             (test code = 382)                                        

 

             CO2 (BEAKER) (test 24 meq/L     22-29                     



             code = 355)                                         

 

             BLOOD UREA NITROGEN 15 mg/dL     7-21                      



             (BEAKER) (test code                                        



             = 354)                                              

 

             CREATININE (BEAKER) 0.94 mg/dL   0.57-1.25                 



             (test code = 358)                                        

 

             GLUCOSE RANDOM 89 mg/dL                         



             (BEAKER) (test code                                        



             = 652)                                              

 

             CALCIUM (BEAKER) 9.1 mg/dL    8.4-10.2                  



             (test code = 697)                                        

 

             EGFR (BEAKER) (test 84 mL/min/1.73                           ESTIMA

ALESSIA GFR IS



             code = 1092) sq m                                   NOT AS ACCURATE

 AS



                                                                 CREATININE



                                                                 CLEARANCE IN



                                                                 PREDICTING



                                                                 GLOMERULAR



                                                                 FILTRATION RATE

.



                                                                 ESTIMATED GFR I

S



                                                                 NOT APPLICABLE 

FOR



                                                                 DIALYSIS PATIEN

TS.



 ID - JOHN FOnce on admission and Daily AM afterwardsOnce on 
admission and Daily AM [id number] 07:20:00





             Test Item    Value        Reference Range Interpretation Comments

 

             MAGNESIUM (BEAKER) (test code = 2.0 mg/dL    1.6-2.6               

    



             627)                                                



 ID - JOHN FOnce on admission and Daily AM afterwardsOnce on 
admission and Daily AM [id number] 07:20:00





             Test Item    Value        Reference Range Interpretation Comments

 

             PHOSPHORUS (BEAKER) (test code = 3.7 mg/dL    2.3-4.7              

     



             604)                                                



 ID - JOHN Hurtadoce on admission and Daily AM afterwardsOnce on 
admission and Daily AM afterwardsPOC-Glucose [id number] 06:37:00





             Test Item    Value        Reference Range Interpretation Comments

 

             POC-Glucose Meter (test 88 mg/dL                         : TE

STED AT [facility]



             code = 1538)                                        [address] [facility], [address]

30:



                                                                 /Techni

jenise



                                                                 ID = [id number] for



                                                                 JESUS MANUEL STUJARETH

TA

 

             Lab Interpretation (test Normal                                 



             code = 51801-5)                                        



[facility] [id number] 06:37:00





             Test Item    Value        Reference Range Interpretation Comments

 

             POC-GLUCOSE METER 88 mg/dL                         : TESTED A

T [facility] [address]



             (BEAKER) (test code =                                        [address] [facility],



             1538)                                               [id number]:



                                                                 /Techni

jenise ID =



                                                                 [id number] for BRAYDEN ZIMMERMAN



CBC with platelet count + automated [id number] 06:01:00





             Test Item    Value        Reference Range Interpretation Comments

 

             WBC (test code = 6690-2) 7.0          See_Comment                [A

utomated message]



                                                                 The system [employer]



                                                                 generated this 

result



                                                                 transmitted ref

erence



                                                                 range: 3.5 - 10

.5



                                                                 K/L. The refe

rence



                                                                 range was not u

sed to



                                                                 interpret this 

result



                                                                 as normal/abnor

mal.

 

             RBC (test code = 789-8) 4.27         See_Comment  L             [Au

tomated message]



                                                                 The system [employer]



                                                                 generated this 

result



                                                                 transmitted ref

erence



                                                                 range: 4.63 - 6

.08



                                                                 M/L. The refe

rence



                                                                 range was not u

sed to



                                                                 interpret this 

result



                                                                 as normal/abnor

mal.

 

             MCHC (test code = 786-4) 35.1         See_Comment  L             [A

utomated message]



                                                                 The system [employer]



                                                                 generated this 

result



                                                                 transmitted ref

erence



                                                                 range: 32.3 - 3

6.5



                                                                 GM/DL. The refe

rence



                                                                 range was not u

sed to



                                                                 interpret this 

result



                                                                 as normal/abnor

mal.

 

             Hematocrit (test code = 38.7 %       40.1-51      L            



             4544-3)                                             

 

             MCV (test code = 787-2) 90.6 fL      79-92.2                   

 

             MCH (test code = 785-6) 31.9 pg      25.7-32.2                 

 

             RDW (test code = 788-0) 12.7 %       11.6-14.4                 

 

             Platelets (test code = 207          See_Comment                [Aut

omated message]



             777-3)                                              The system [employer]



                                                                 generated this 

result



                                                                 transmitted ref

erence



                                                                 range: 150 - 45

0 K/CU



                                                                 MM. The referen

ce



                                                                 range was not u

sed to



                                                                 interpret this 

result



                                                                 as normal/abnor

mal.

 

             MPV (test code = 11.4 fL      9.4-12.4                  



             01034-3)                                            

 

             nRBC (test code = 413) 0            See_Comment                [Aut

omated message]



                                                                 The system [employer]



                                                                 generated this 

result



                                                                 transmitted ref

erence



                                                                 range: 0 - 0 /1

00



                                                                 WBC. The refere

nce



                                                                 range was not u

sed to



                                                                 interpret this 

result



                                                                 as normal/abnor

mal.

 

             % Neutros (test code = 54 %                                   



             429)                                                

 

             % Lymphs (test code = 35 %                                   



             430)                                                

 

             % Monos (test code = 8 %                                    



             431)                                                

 

             % Eos (test code = 432) 3 %                                    

 

             % Baso (test code = 437) 1 %                                    

 

             # Neutros (test code = 3.76         See_Comment                [Aut

omated message]



             670)                                                The system [employer]



                                                                 generated this 

result



                                                                 transmitted ref

erence



                                                                 range: 1.78 - 5

.38



                                                                 K/L. The refe

rence



                                                                 range was not u

sed to



                                                                 interpret this 

result



                                                                 as normal/abnor

mal.

 

             # Lymphs (test code = 2.40         See_Comment                [Auto

mated message]



             414)                                                The system [employer]



                                                                 generated this 

result



                                                                 transmitted ref

erence



                                                                 range: 1.32 - 3

.57



                                                                 K/L. The refe

rence



                                                                 range was not u

sed to



                                                                 interpret this 

result



                                                                 as normal/abnor

mal.

 

             # Monos (test code = 0.53         See_Comment                [Autom

ated message]



             415)                                                The system [employer]



                                                                 generated this 

result



                                                                 transmitted ref

erence



                                                                 range: 0.30 - 0

.82



                                                                 K/L. The refe

rence



                                                                 range was not u

sed to



                                                                 interpret this 

result



                                                                 as normal/abnor

mal.

 

             # Eos (test code = 416) 0.19         See_Comment                [Au

tomated message]



                                                                 The system [employer]



                                                                 generated this 

result



                                                                 transmitted ref

erence



                                                                 range: 0.04 - 0

.54



                                                                 K/L. The refe

rence



                                                                 range was not u

sed to



                                                                 interpret this 

result



                                                                 as normal/abnor

mal.

 

             # Baso (test code = 417) 0.05         See_Comment                [A

utomated message]



                                                                 The system [employer]



                                                                 generated this 

result



                                                                 transmitted ref

erence



                                                                 range: 0.01 - 0

.08



                                                                 K/L. The refe

rence



                                                                 range was not u

sed to



                                                                 interpret this 

result



                                                                 as normal/abnor

mal.

 

             Immature     0 %          0-1                       



             Granulocytes-Relative                                        



             (test code = 2801)                                        

 

             Lab Interpretation (test Abnormal                               



             code = 27893-8)                                        



[facility] W/PLT COUNT &amp; AUTO [id number] 
06:01:00





             Test Item    Value        Reference Range Interpretation Comments

 

             WHITE BLOOD CELL COUNT (BEAKER) 7.0 K/ L     3.5-10.5              

    



             (test code = 775)                                        

 

             RED BLOOD CELL COUNT (BEAKER) 4.27 M/ L    4.63-6.08    L          

  



             (test code = 761)                                        

 

             HEMOGLOBIN (BEAKER) (test code = 13.6 GM/DL   13.7-17.5    L       

     



             410)                                                

 

             HEMATOCRIT (BEAKER) (test code = 38.7 %       40.1-51.0    L       

     



             411)                                                

 

             MEAN CORPUSCULAR VOLUME (BEAKER) 90.6 fL      79.0-92.2            

     



             (test code = 753)                                        

 

             MEAN CORPUSCULAR HEMOGLOBIN 31.9 pg      25.7-32.2                 



             (BEAKER) (test code = 751)                                        

 

             MEAN CORPUSCULAR HEMOGLOBIN CONC 35.1 GM/DL   32.3-36.5            

     



             (BEAKER) (test code = 752)                                        

 

             RED CELL DISTRIBUTION WIDTH 12.7 %       11.6-14.4                 



             (BEAKER) (test code = 412)                                        

 

             PLATELET COUNT (BEAKER) (test 207 K/CU MM  150-450                 

  



             code = 756)                                         

 

             MEAN PLATELET VOLUME (BEAKER) 11.4 fL      9.4-12.4                

  



             (test code = 754)                                        

 

             NUCLEATED RED BLOOD CELLS 0 /100 WBC   0-0                       



             (BEAKER) (test code = 413)                                        

 

             NEUTROPHILS RELATIVE PERCENT 54 %                                  

 



             (BEAKER) (test code = 429)                                        

 

             LYMPHOCYTES RELATIVE PERCENT 35 %                                  

 



             (BEAKER) (test code = 430)                                        

 

             MONOCYTES RELATIVE PERCENT 8 %                                    



             (BEAKER) (test code = 431)                                        

 

             EOSINOPHILS RELATIVE PERCENT 3 %                                   

 



             (BEAKER) (test code = 432)                                        

 

             BASOPHILS RELATIVE PERCENT 1 %                                    



             (BEAKER) (test code = 437)                                        

 

             NEUTROPHILS ABSOLUTE COUNT 3.76 K/ L    1.78-5.38                 



             (BEAKER) (test code = 670)                                        

 

             LYMPHOCYTES ABSOLUTE COUNT 2.40 K/ L    1.32-3.57                 



             (BEAKER) (test code = 414)                                        

 

             MONOCYTES ABSOLUTE COUNT (BEAKER) 0.53 K/ L    0.30-0.82           

      



             (test code = 415)                                        

 

             EOSINOPHILS ABSOLUTE COUNT 0.19 K/ L    0.04-0.54                 



             (BEAKER) (test code = 416)                                        

 

             BASOPHILS ABSOLUTE COUNT (BEAKER) 0.05 K/ L    0.01-0.08           

      



             (test code = 417)                                        

 

             IMMATURE GRANULOCYTES-RELATIVE 0 %          0-1                    

   



             PERCENT (BEAKER) (test code =                                      

  



             2801)                                               



POCT-GLUCOSE [id number] 00:19:00





             Test Item    Value        Reference Range Interpretation Comments

 

             POC-GLUCOSE METER 114 mg/dL           H            : TESTED A

T [facility] [address]



             (BEAKER) (test code =                                        [address] [place],



             1538)                                               [id number]:



                                                                 /Techni

jenise ID



                                                                 = [id number] for BRAYDEN TOTH



EEG AWAKE AND [id number] 17:48:00Reason for exam:-&gt;R/o seizures
************************************************************[facility]ENTER[facility] KASANDRA CUELLAR : 1965 Sex: 
M************************************************************NEUROPHYSIOLOGY EEG
REPORT DATES OF TEST: 21 DATE OF REPORT: 21 Name: Kasandra Cuellar MRN: 
[id number] ACC #: [id number] EEG Number:  Start time: 2:59 PM Stop time: 3:19
PM CPT Code: 54163 ICD-10: R56.9 HISTORY: 55-year-old man presented with right 
sided SDH and experiencing left sided numbness MEDICATIONS THAT COULD AFFECT 
EEG: No anti-seizure medications TECHNICAL SUMMARY: This is a digital video EEG 
recorded with 32 input channels reviewed with bipolar and referential montages 
using the modified combinatorial system nomenclature. DESCRIPTION OF RECORD: 
During the maximally alert state, a well-developed, well-modulated 10.5-11 Hz 
posterior dominant rhythm was seen that was symmetric, reactive to eye opening 
and well regulated. Low voltage beta activity predominated anteriorly in 
bilateral frontal regions. During drowsiness, there is an attenuation of a 
posterior dominant rhythm and an increase in fronto-central theta. Stage 2 sleep
was reached and characterized by symmetric sleep spindles. HV: Hyperventilation 
was not performed. PHOTIC STIMULATION: Photic stimulation was performed from 1-
33 Hz. Photic stimulation didn't elicit abnormal discharges. EVENTS: No clinical
or electrographic seizures were recorded. IMPRESSION: Normal Awake and 
Drowsy/Sleep EEG CLINICAL CORRELATION: This is a normal record in the awake and 
sleep states. No focal areas of neuronal dysfunction or epileptiform discharges.
No clinical or electrographic seizures were recorded. An EEG without 
epileptiform discharges does not exclude the possibility of epilepsy. If the 
clinical suspicion of epilepsy remains, consider additional EEG recordings. 
Mostafa Hotait, MD Neurophysiology/ Epilepsy Fellow I have reviewed this 
electroencephalogram, discussed the findings with the fellow, addended the 
fellow's report and agree with the overall assessment. Rikki Ji MD, MS 
Clinical Neurophysiology/Epilepsy Attending Electronically signed by: RIKKI JI MD on 2021 05:48 PMEEG AWAKE AND [id number] 17:48:00
Interface, External Ris In - 2021 5:48 PM CDTNEUROPHYSIOLOGY EEG REPORT 
DATES OF TEST: 21 DATE OF REPORT: 21 Name: Kasandra Cuellar MRN: 
[id number] ACC #: [id number] EEG Number:  Starttime: 2:59 PM Stop time: 3:19 
PM CPT Code: 05899 ICD-10: R56.9 HISTORY: 55-year-old man presented with right 
sided SDH and experiencing left sided numbness MEDICATIONS THAT COULD AFFECT 
EEG: No anti-seizure medications TECHNICAL SUMMARY: This is a digital video EEG 
recorded with 32 input channels reviewed with bipolar and referential montages 
using the modified combinatorial system nomenclature. DESCRIPTION OF RECORD: 
During the maximally alert state, a well-developed, well-modulated 10.5-11 Hz 
posterior dominant rhythm was seen that was symmetric, reactive to eye opening 
and well regulated. Low voltage beta activity predominated anteriorly in 
bilateral frontal regions. During drowsiness, there isan attenuation of a 
posterior dominant rhythm and an increase in fronto-central theta. Stage 2 sleep
was reached and characterized by symmetric sleep spindles. HV: Hyperventilation 
was not performed. PHOTIC STIMULATION: Photic stimulation was performed from 1-
33 Hz. Photic stimulation didn't elicit abnormal discharges. EVENTS: No clinical
or electrographic seizures were recorded. IMPRESSION: Normal Awake and 
Drowsy/Sleep EEG CLINICAL CORRELATION: This is a normal record in the awake and 
sleep states. No focal areas of neuronal dysfunction or epileptiform discharges.
No clinical or electrographic seizures were recorded. An EEG without 
epileptiform discharges does not exclude the possibility of epilepsy. If the 
clinical suspicion of epilepsy remains, consider additional EEG recordings. 
Samra Nash MD Neurophysiology/ Epilepsy Fellow I have reviewed this 
electroencephalogram, discussed the findings with the fellow, addended the 
fellow's report and agree with the overall assessment.  Rikki Ji MD, MS 
Clinical Neurophysiology/Epilepsy Attending Electronically signed by: Brien SHEA 2021 05:48 [facility]
2021 12:41:00





             Test Item    Value        Reference Range Interpretation Comments

 

             POC-GLUCOSE METER 140 mg/dL           H            : Notified

 RN/MD:



             (GAGAN) (test code =                                        TESTED

 AT [facility] [address]



             1538)                                               [facility],



                                                                 [id number]:



                                                                 /Techni

jenise ID



                                                                 = [id number] for MELIA TORRES



CT, CAROTID, [id number] 07:59:00Unlisted Reason for Exam - Click Yes and 
Enter Reason Below-&gt;No
************************************************************[facility] : 1965 Sex: 
M************************************************************FINAL REPORT 
PATIENT ID: [id number] CLINICAL HISTORY: Unlisted Reason for ExamTIA w/u 
TECHNIQUE: Contiguous contrast-enhanced axial images through the neck followed 
by axial images through the head with coronaland sagittal reformations to assess
the arterial circulation. 3-D reconstructions were performed using a volume 
rendered technique separately on a workstation. This exam was performed 
according to the departmental dose optimization program which includes automated
exposure control, adjustment of the mA and/or kV according to the patient size, 
and/or use of an iterative reconstruction technique. COMPARISON: MRI brain 
2021, head CT 4/3/2021 FINDINGS: A left-sided craniotomy is again seen. Left
greater than right cerebral convexity subdural collections are grossly unchanged
allowing for differences in technique. Right midline shift of the septum 
pellucidum is unchanged. The CT angiogram images ofthe head reveal no evidence 
of intracranial aneurysm, critical stenosis, or large vessel occlusion. The 
major intradural venous sinuses appear patent. There is no hemodynamically 
significant stenosis in either cervical internal carotid artery by NASCET 
criteria. The vertebral arteries in the neck are patent including their origins.
There is left vertebral dominance. There are dorsal spondylitic changes in the 
cervical spine. There are scattered subcentimeter lymph nodes in the neck. The 
visualized lung apices are clear. IMPRESSION: No evidence for a [place]
large vessel occlusion. No evidence of hemodynamically significant stenosis in 
the cervical carotid or vertebral arteries by NASCET criteria. Signed: Theodora Marroquin MDReport Verified Date/Time: 2021 07:59:19 Reading Location: [address] Neuro Reading Room Electronically signed by: THEODORA MARROQUIN M.D. on 
2021 07:59 AMCT, CTANGIO [id number] 07:59:00Unlisted Reason for Exam 
- Click Yes and Enter Reason Below-&gt;YesUnlisted Reason for Exam-&gt;TIA w/u
************************************************************[facility] : 1965 Sex: 
M************************************************************FINAL REPORT 
PATIENT ID: [id number] CLINICAL HISTORY: Unlisted Reason for ExamTIA w/u 
TECHNIQUE: Contiguous contrast-enhanced axial images through the neck followed 
by axial images through the head with coronaland sagittal reformations to assess
the arterial circulation. 3-D reconstructions were performed using a volume 
rendered technique separately on a workstation. This exam was performed 
according to the departmental dose optimization program which includes automated
exposure control, adjustment of the mA and/or kV according to the patient size, 
and/or use of an iterative reconstruction technique. COMPARISON: MRI brain 
2021, head CT 4/3/2021 FINDINGS: A left-sided craniotomy is again seen. Left
greater than right cerebral convexity subdural collections are grossly unchanged
allowing for differences in technique. Right midline shift of the septum 
pellucidum is unchanged. The CT angiogram images ofthe head reveal no evidence 
of intracranial aneurysm, critical stenosis, or large vessel occlusion. The 
major intradural venous sinuses appear patent. There is no hemodynamically 
significant stenosis in either cervical internal carotid artery by NASCET 
criteria. The vertebral arteries in the neck are patent including their origins.
There is left vertebral dominance. There are dorsal spondylitic changes in the 
cervical spine. There are scattered subcentimeter lymph nodes in the neck. The 
visualized lung apices are clear. IMPRESSION: No evidence for a [place]
large vessel occlusion. No evidence of hemodynamically significant stenosis in 
the cervical carotid or vertebral arteries by NASCET criteria. Signed: Theodora Marroquin MDReport Verified Date/Time: 2021 07:59:19 Reading Location: [address] Neuro Reading Room Electronically signed by: THEODORA MARROQUIN M.D. on 
2021 07:59 [facility] [id number] 07:59:00Interface, External Ris In - 
2021 8:01 AM CDTFINAL REPORT PATIENT ID: [id number] CLINICAL HISTORY: 
Unlisted Reason for ExamTIA w/u TECHNIQUE: Contiguous contrast-enhanced axial 
images through the neck followed by axial images through the head with coronal 
and sagittal reformations to assess the arterial circulation. 3-D 
reconstructions were performed using a volume rendered technique separately on a
workstation. This exam was performed according to the departmental dose 
optimization program which includes automated exposure control, adjustment of 
the mA and/or kV according to the patient size, and/or use of an iterative 
reconstruction technique. COMPARISON: MRI brain 2021, head CT 4/3/2021
FINDINGS: A left-sided craniotomy is again seen. Left greater than right 
cerebral convexity subduralcollections are grossly unchanged allowing for 
differences in technique. Right midline shift of the septum pellucidum is 
unchanged. The CT angiogram images of the head reveal no evidence of 
intracranial aneurysm, critical stenosis, or large vessel occlusion. The major 
intradural venous sinuses appear patent. There is no hemodynamically significant
stenosis in either cervical internal carotid artery by NASCET criteria. The 
vertebral arteries in the neck are patent including their origins. There is left
vertebral dominance. There are dorsal spondylitic changes in the cervical spine.
There are scattered subcentimeter lymph nodes in the neck. The visualized lung 
apices are clear. IMPRESSION: No evidence for a [place] large vessel 
occlusion. No evidence of hemodynamically significant stenosisin the cervical 
carotid or vertebral arteries by NASCET criteria. Signed: Theodora Marroquin MDReport
Verified Date/Time: 2021 07:59:19 Reading Location: [facility] B1 C013V Neuro 
Reading Room Electronicallysigned by: THEODORA MARROQUIN M.D. on 2021 07:59 
[facility] [id number] 07:59:00Interface, External 
Ris In - 2021 8:01 AM CDTFINAL REPORT PATIENT ID: [id number] CLINICAL 
HISTORY: Unlisted Reason for ExamTIA w/u TECHNIQUE: Contiguous contrast-enhanced
axial images through the neck followed by axial images through the head with 
coronal and sagittal reformations to assess the arterial circulation. 3-D 
reconstructions were performed using a volume rendered technique separately on a
workstation. This exam was performed according to the departmental dose 
optimization program which includes automated exposure control, adjustment of 
the mA and/or kV according to the patient size, and/or use of an iterative 
reconstruction technique. COMPARISON: MRI brain 2021, head CT 4/3/2021
FINDINGS: A left-sided craniotomy is again seen. Left greater than right 
cerebral convexity subduralcollections are grossly unchanged allowing for 
differences in technique. Right midline shift of the septum pellucidum is 
unchanged. The CT angiogram images of the head reveal no evidence of 
intracranial aneurysm, critical stenosis, or large vessel occlusion. The major 
intradural venous sinuses appear patent. There is no hemodynamically significant
stenosis in either cervical internal carotid artery by NASCET criteria. The 
vertebral arteries in the neck are patent including their origins. There is left
vertebral dominance. There are dorsal spondylitic changes in the cervical spine.
There are scattered subcentimeter lymph nodes in the neck. The visualized lung 
apices are clear. IMPRESSION: No evidence for a [place] large vessel 
occlusion. No evidence of hemodynamically significant stenosisin the cervical 
carotid or vertebral arteries by NASCET criteria. Signed: Theodora Marroquin MDReport
Verified Date/Time: 2021 07:59:19 Reading Location: [address] Neuro 
Reading Room Electronicallysigned by: THEODORA MARROQUIN M.D. on 2021 07:59 
[facility] CenterMAGN[facility]IUM2021-[facility]-07 05:38:00





             Test Item    Value        Reference Range Interpretation Comments

 

             MAGNESIUM (BEAKER) 2.2 mg/dL    1.6-2.6                   Specimen 

slightly



             (test code = 627)                                        hemolyzed



 ID - BSOnce on admission and Daily AM afterwardsOnce on admission and 
Daily AM [id number] 05:38:00





             Test Item    Value        Reference Range Interpretation Comments

 

             PHOSPHORUS (BEAKER) 3.9 mg/dL    2.3-4.7                   Specimen

 slightly



             (test code = 604)                                        hemolyzed



 ID - BSOnce on admission and Daily AM afterwardsOnce on admission and 
Daily AM afterwardsBASIC METABOLIC [id number] 05:38:00





             Test Item    Value        Reference Range Interpretation Comments

 

             SODIUM (BEAKER) 135 meq/L    136-145      L            



             (test code = 381)                                        

 

             POTASSIUM (BEAKER) 4.8 meq/L    3.5-5.1                   Specimen 

slightly



             (test code = 379)                                        hemolyzed

 

             CHLORIDE (BEAKER) 102 meq/L                        



             (test code = 382)                                        

 

             CO2 (BEAKER) (test 23 meq/L     22-29                     



             code = 355)                                         

 

             BLOOD UREA NITROGEN 13 mg/dL     7-21                      



             (BEAKER) (test code                                        



             = 354)                                              

 

             CREATININE (BEAKER) 0.95 mg/dL   0.57-1.25                 Specimen

 slightly



             (test code = 358)                                        hemolyzed

 

             GLUCOSE RANDOM 89 mg/dL                         



             (BEAKER) (test code                                        



             = 652)                                              

 

             CALCIUM (BEAKER) 9.3 mg/dL    8.4-10.2                  



             (test code = 697)                                        

 

             EGFR (BEAKER) (test 83 mL/min/1.73                           ESTIMA

ALESSIA GFR IS



             code = 1092) sq m                                   NOT AS ACCURATE

 AS



                                                                 CREATININE



                                                                 CLEARANCE IN



                                                                 PREDICTING



                                                                 GLOMERULAR



                                                                 FILTRATION RATE

.



                                                                 ESTIMATED GFR I

S



                                                                 NOT APPLICABLE 

FOR



                                                                 DIALYSIS PATIEN

TS.



 ID - BSOnce on admission and Daily AM afterwardsOnce on admission and 
Daily AM afterwardsCBC W/PLT COUNT &amp; AUTO [id number] 05:17:00





             Test Item    Value        Reference Range Interpretation Comments

 

             WHITE BLOOD CELL COUNT (BEAKER) 6.3 K/ L     3.5-10.5              

    



             (test code = 775)                                        

 

             RED BLOOD CELL COUNT (BEAKER) 4.36 M/ L    4.63-6.08    L          

  



             (test code = 761)                                        

 

             HEMOGLOBIN (BEAKER) (test code = 13.6 GM/DL   13.7-17.5    L       

     



             410)                                                

 

             HEMATOCRIT (BEAKER) (test code = 41.1 %       40.1-51.0            

     



             411)                                                

 

             MEAN CORPUSCULAR VOLUME (BEAKER) 94.3 fL      79.0-92.2    H       

     



             (test code = 753)                                        

 

             MEAN CORPUSCULAR HEMOGLOBIN 31.2 pg      25.7-32.2                 



             (BEAKER) (test code = 751)                                        

 

             MEAN CORPUSCULAR HEMOGLOBIN CONC 33.1 GM/DL   32.3-36.5            

     



             (BEAKER) (test code = 752)                                        

 

             RED CELL DISTRIBUTION WIDTH 12.3 %       11.6-14.4                 



             (BEAKER) (test code = 412)                                        

 

             PLATELET COUNT (BEAKER) (test 191 K/CU MM  150-450                 

  



             code = 756)                                         

 

             MEAN PLATELET VOLUME (BEAKER) 11.4 fL      9.4-12.4                

  



             (test code = 754)                                        

 

             NUCLEATED RED BLOOD CELLS 0 /100 WBC   0-0                       



             (BEAKER) (test code = 413)                                        

 

             NEUTROPHILS RELATIVE PERCENT 60 %                                  

 



             (BEAKER) (test code = 429)                                        

 

             LYMPHOCYTES RELATIVE PERCENT 30 %                                  

 



             (BEAKER) (test code = 430)                                        

 

             MONOCYTES RELATIVE PERCENT 8 %                                    



             (BEAKER) (test code = 431)                                        

 

             EOSINOPHILS RELATIVE PERCENT 2 %                                   

 



             (BEAKER) (test code = 432)                                        

 

             BASOPHILS RELATIVE PERCENT 1 %                                    



             (BEAKER) (test code = 437)                                        

 

             NEUTROPHILS ABSOLUTE COUNT 3.74 K/ L    1.78-5.38                 



             (BEAKER) (test code = 670)                                        

 

             LYMPHOCYTES ABSOLUTE COUNT 1.86 K/ L    1.32-3.57                 



             (BEAKER) (test code = 414)                                        

 

             MONOCYTES ABSOLUTE COUNT (BEAKER) 0.47 K/ L    0.30-0.82           

      



             (test code = 415)                                        

 

             EOSINOPHILS ABSOLUTE COUNT 0.12 K/ L    0.04-0.54                 



             (BEAKER) (test code = 416)                                        

 

             BASOPHILS ABSOLUTE COUNT (BEAKER) 0.05 K/ L    0.01-0.08           

      



             (test code = 417)                                        

 

             IMMATURE GRANULOCYTES-RELATIVE 1 %          0-1                    

   



             PERCENT (BEAKER) (test code =                                      

  



             2801)                                               



MR, BRAIN, WITHOUT [id number] 01:47:00Unlisted Reason for Exam - Click 
Yes and Enter Reason Below-&gt;No
************************************************************[facility] : 1965 Sex: 
M************************************************************FINAL REPORT 
PATIENT ID: [id number] MRI Brain without contrast Clinical History: TIA, initial 
exam Technique:MRI of the brain utilizing axial T2, FLAIR, GRE, DWI; sagittal 
and coronal T1-weighted images. Comparisons: CT head dated 4/3/2021 
Findings:Status post left sided craniotomy for drainage of a left cerebral 
convexity subdural hematoma.Mixed density left cerebral convexity subdural 
hematoma is again noted measuring up to 1.0 cm, similar when compared to prior. 
Few small foci of gas within the collection likely related to recent 
instrumentation. There is mild mass effect on adjacent structures. Thin mixed 
density subdural hematoma along the right superior cerebral convexity measuring 
up to 7 mm possibly slightly increased in size in the interval however 
differences in technique limits evaluation. There is hyperintense FLAIR signal 
within the subarachnoid spaces of the right superior surgical convexity 
suggestive of small volume subarachnoid hemorrhage. There is 5 mm of rightward 
midline shift when measured at the septum pellucidum, unchanged. Mild effacement
of the left lateral ventricle. Multiplebilateral T2 and FLAIR hyperintense white
matter foci likely represent chronic white matter microvascular disease.There is
no evidence of acute infarct . The craniocervical junction is preserved. The ma
bob intracranial flow-voids appear patent. Paranasal sinuses are clear. Middle 
ears and mastoid air cells are clear. Intraorbital contents are unremarkable. 
Postsurgical changes in the left scalp. IMPRESSION: Status post left sided 
craniotomy for drainage of a left cerebral convexity subdural hematoma. Grossly 
unchanged size of the left cerebral convexity subdural with persistent mass 
effect on adjacent structures including mild effacement of the left lateral 
ventricle. Unchanged 5 mm of rightward midline shift.. Possible slight interval 
increase in size of the mixed density subdural hematoma alongthe right cerebral 
convexity. Findings above suggestive of small volume subarachnoid hemorrhage in 
the right cerebral hemisphere. Signed: Roxanne Christian Verified 
Date/Time: 2021 01:47:24 Electronically signed by: ROXANNE CHRISTIAN MD on 2021 01:47 AMMR brain without IV [id number] 
01:47:00Interface, External Ris In - 2021 1:50 AM CDTFINAL REPORT PATIENT 
ID: [id number] MRI Brain without contrast Clinical History: TIA, initial exam 
Technique: MRI of the brain utilizing axial T2, FLAIR, GRE, DWI; sagittal and 
coronal T1-weighted images. Comparisons: CT head dated 4/3/2021 Findings:Status 
post left sided craniotomy for drainage of a left cerebral convexity subdural 
hematoma.Mixed density left cerebral convexity subdural hematoma is again noted 
measuring up to 1.0 cm, similar when compared to prior. Few small foci of gas 
within the collection likely related to recent instrumentation.There is mild 
mass effect on adjacent structures. Thin mixed density subdural hematoma along 
the right superior cerebral convexity measuring up to 7 mm possibly slightly 
increased in size in the interval however differences in technique limits 
evaluation. There is hyperintense FLAIR signal within the subarachnoid spaces of
the right superior surgical convexity suggestive of small volume subarachnoid h
emorrhage. There is 5 mm of rightward midline shift when measured at the septum 
pellucidum, unchanged. Mild effacement of the left lateral ventricle. Multiple 
bilateral T2 and FLAIR hyperintense white matter foci likely represent chronic 
white matter microvascular disease.There is no evidence of acuteinfarct . The 
craniocervical junction is preserved. The major intracranial flow-voids appear 
patent.Paranasal sinuses are clear. Middle ears and mastoid air cells are clear.
Intraorbital contents are unremarkable. Postsurgical changes in the left scalp. 
IMPRESSION: Status post left sided craniotomy for drainage of a left cerebral 
convexity subdural hematoma. Grossly unchanged size of the left cerebral 
convexity subdural with persistent mass effect on adjacent structures including 
mild effacement ofthe left lateral ventricle. Unchanged 5 mm of rightward 
midline shift.. Possible slight interval increase in size of the mixed density 
subdural hematoma along the right cerebral convexity. Findings above suggestive 
of small volume subarachnoid hemorrhage in the right cerebral hemisphere. 
Signed: Roxanne Christian MDReport Verified Date/Time: 2021 01:47:24 
Electronically signed by: ROXANNE CHRISTIAN MD on 2021 01:47 [facility] CenterHepatic function [id number] 18:54:00





             Test Item    Value        Reference Range Interpretation Comments

 

             Protein, Total (test 7.3          See_Comment                [Autom

ated



             code = 2885-2)                                        message] The



                                                                 system which



                                                                 generated this



                                                                 result transmit

alessia



                                                                 reference range

:



                                                                 6.0 - 8.3 gm/dL

.



                                                                 The reference



                                                                 range was not u

sed



                                                                 to interpret th

is



                                                                 result as



                                                                 normal/abnormal

.

 

             Albumin (test code = 4.3 g/dL     3.5-5                     



             63965-0)                                            

 

             Total Bilirubin (test 0.5 mg/dL    0.2-1.2                   



             code = 1975-2)                                        

 

             Bilirubin, Direct 0.2 mg/dL    0.1-0.5                   



             (test code = 1968-7)                                        

 

             Alkaline Phosphatase 60 U/L                           



             (test code = 6768-6)                                        

 

             AST (test code = 21 U/L       5-34                      



             1920-8)                                             

 

             ALT (test code = 31 U/L       6-55                      



             1742-6)                                             

 

             SCOOTER (test code = SCOOTER)  ID -                           



                          BS                                     

 

             Lab Interpretation Normal                                 



             (test code = 23805-3)                                        



[id number]54:00





             Test Item    Value        Reference Range Interpretation Comments

 

             TOTAL PROTEIN (BEAKER) (test code = 7.3 gm/dL    6.0-8.3           

        



             770)                                                

 

             ALBUMIN (BEAKER) (test code = 1145) 4.3 g/dL     3.5-5.0           

        

 

             BILIRUBIN TOTAL (BEAKER) (test code 0.5 mg/dL    0.2-1.2           

        



             = 377)                                              

 

             BILIRUBIN DIRECT (BEAKER) (test 0.2 mg/dL    0.1-0.5               

    



             code = 706)                                         

 

             ALKALINE PHOSPHATASE (BEAKER) (test 60 U/L                   

        



             code = 346)                                         

 

             AST (SGOT) (BEAKER) (test code = 21 U/L       5-34                 

     



             353)                                                

 

             ALT (SGPT) (BEAKER) (test code = 31 U/L       6-55                 

     



             347)                                                



 ID - BSBASIC METABOLIC [id number] 18:53:00





             Test Item    Value        Reference Range Interpretation Comments

 

             SODIUM (BEAKER) 138 meq/L    136-145                   



             (test code = 381)                                        

 

             POTASSIUM (BEAKER) 4.2 meq/L    3.5-5.1                   



             (test code = 379)                                        

 

             CHLORIDE (BEAKER) 104 meq/L                        



             (test code = 382)                                        

 

             CO2 (BEAKER) (test 26 meq/L     22-29                     



             code = 355)                                         

 

             BLOOD UREA NITROGEN 12 mg/dL     7-21                      



             (BEAKER) (test code                                        



             = 354)                                              

 

             CREATININE (BEAKER) 0.89 mg/dL   0.57-1.25                 



             (test code = 358)                                        

 

             GLUCOSE RANDOM 104 mg/dL                        



             (BEAKER) (test code                                        



             = 652)                                              

 

             CALCIUM (BEAKER) 9.2 mg/dL    8.4-10.2                  



             (test code = 697)                                        

 

             EGFR (BEAKER) (test 89 mL/min/1.73                           ESTIMA

ALESSIA GFR IS



             code = 1092) sq m                                   NOT AS ACCURATE

 AS



                                                                 CREATININE



                                                                 CLEARANCE IN



                                                                 PREDICTING



                                                                 GLOMERULAR



                                                                 FILTRATION RATE

.



                                                                 ESTIMATED GFR I

S



                                                                 NOT APPLICABLE 

FOR



                                                                 DIALYSIS PATIEN

TS.



 ID - [id number] 18:53:00





             Test Item    Value        Reference Range Interpretation Comments

 

             MAGNESIUM (BEAKER) (test code = 2.2 mg/dL    1.6-2.6               

    



             627)                                                



 ID - [id number] 18:53:00





             Test Item    Value        Reference Range Interpretation Comments

 

             PHOSPHORUS (BEAKER) (test code = 3.4 mg/dL    2.3-4.7              

     



             604)                                                



 ID - [id number] 18:48:00





             Test Item    Value        Reference Range Interpretation Comments

 

             Fibrinogen (test code = 3255-7) 545 mg/dl    225-434      H        

    

 

             Lab Interpretation (test code = Abnormal                           

    



             39181-3)                                            



[facility]





             Test Item    Value        Reference Range Interpretation Comments

 

             FIBRINOGEN LEVEL (BEAKER) (test 545 mg/dl    225-434      H        

    



             code = 658)                                         



[id number] 18:39:00





             Test Item    Value        Reference Range Interpretation Comments

 

             PTT (test code = 81073-5) 35.0         See_Comment                [

Automated message]



                                                                 The system [employer]



                                                                 generated this 

result



                                                                 transmitted ref

erence



                                                                 range: 22.5 - 3

6.0



                                                                 seconds. The re

ference



                                                                 range was not u

sed to



                                                                 interpret this 

result



                                                                 as normal/abnor

mal.

 

             Lab Interpretation (test Normal                                 



             code = 16765-3)                                        



[facility]





             Test Item    Value        Reference Range Interpretation Comments

 

             PARTIAL THROMBOPLASTIN TIME 35.0 seconds 22.5-36.0                 



             (BEAKER) (test code = 760)                                        



Prothrombin time/[id number] 18:38:00





             Test Item    Value        Reference    Interpretation Comments



                                       Range                     

 

             Protime (test code = 13.9         See_Comment                [Autom

ated



             5902-2)                                             message] The



                                                                 system which



                                                                 generated this



                                                                 result



                                                                 transmitted



                                                                 reference range

:



                                                                 11.9 - 14.2



                                                                 seconds. The



                                                                 reference range



                                                                 was not used to



                                                                 interpret this



                                                                 result as



                                                                 normal/abnormal

.

 

             INR (test code = 1.10         See_Comment                [Automated



             6301-6)                                             message] The



                                                                 system which



                                                                 generated this



                                                                 result



                                                                 transmitted



                                                                 reference range

:



                                                                 <=5.90. The



                                                                 reference range



                                                                 was not used to



                                                                 interpret this



                                                                 result as



                                                                 normal/abnormal

.

 

             SCOOTER (test code = Effective 2019:                           



             SCOOTER)         PT Reference Range                           



                          ChangeNew: 11.9-14.2                           



                          Previous: 11.7-14.7                           



                          RECOMMENDED                            



                          COUMADIN/WARFARIN                           



                          INR THERAPY                            



                          RANGESSTANDARD DOSE:                           



                          2.0-3.0 Includes:                           



                          PROPHYLAXIS for                           



                          venous thrombosis,                           



                          systemic                               



                          embolization;                           



                          TREATMENT for venous                           



                          thrombosis and/or                           



                          pulmonary                              



                          embolus.HIGH RISK:                           



                          Target INR is                           



                          2.5-3.5 for patients                           



                          wiht mechanical                           



                          heart valves.                           

 

             Lab Interpretation Normal                                 



             (test code =                                        



             41474-0)                                            



[facility]PROTHROM[facility]/[id number] 18:38:00





             Test Item    Value        Reference Range Interpretation Comments

 

             PROTIME (BEAKER) 13.9 seconds 11.9-14.2                 



             (test code = 759)                                        

 

             INR (BEAKER) (test 1.10         See_Comment                [Automat

ed message]



             code = 370)                                         The system [employer]



                                                                 generated this 

result



                                                                 transmitted ref

erence



                                                                 range: <=5.90. 

The



                                                                 reference range

 was



                                                                 not used to int

erpret



                                                                 this result as



                                                                 normal/abnormal

.



Effective 2019: PT Reference Range ChangeNew: 11.9-14.2 Previous: 11.7-
14.7RECOMMENDED COUMADIN/WARFARIN INR THERAPY RANGESSTANDARD DOSE: 2.0-3.0 
Includes: PROPHYLAXIS for venous thrombosis, systemic embolization; TREATMENT 
for venous thrombosis and/or pulmonary embolus.HIGH RISK: Target INR is 2.5-3.5 
for patients wiht mechanical heart valves.CBC W/PLT COUNT &amp; AUTO 
[id number] 18:26:00





             Test Item    Value        Reference Range Interpretation Comments

 

             WHITE BLOOD CELL COUNT (BEAKER) 6.1 K/ L     3.5-10.5              

    



             (test code = 775)                                        

 

             RED BLOOD CELL COUNT (BEAKER) 4.37 M/ L    4.63-6.08    L          

  



             (test code = 761)                                        

 

             HEMOGLOBIN (BEAKER) (test code = 13.8 GM/DL   13.7-17.5            

     



             410)                                                

 

             HEMATOCRIT (BEAKER) (test code = 40.4 %       40.1-51.0            

     



             411)                                                

 

             MEAN CORPUSCULAR VOLUME (BEAKER) 92.4 fL      79.0-92.2    H       

     



             (test code = 753)                                        

 

             MEAN CORPUSCULAR HEMOGLOBIN 31.6 pg      25.7-32.2                 



             (BEAKER) (test code = 751)                                        

 

             MEAN CORPUSCULAR HEMOGLOBIN CONC 34.2 GM/DL   32.3-36.5            

     



             (BEAKER) (test code = 752)                                        

 

             RED CELL DISTRIBUTION WIDTH 12.5 %       11.6-14.4                 



             (BEAKER) (test code = 412)                                        

 

             PLATELET COUNT (BEAKER) (test 199 K/CU MM  150-450                 

  



             code = 756)                                         

 

             MEAN PLATELET VOLUME (BEAKER) 11.4 fL      9.4-12.4                

  



             (test code = 754)                                        

 

             NUCLEATED RED BLOOD CELLS 0 /100 WBC   0-0                       



             (BEAKER) (test code = 413)                                        

 

             NEUTROPHILS RELATIVE PERCENT 71 %                                  

 



             (BEAKER) (test code = 429)                                        

 

             LYMPHOCYTES RELATIVE PERCENT 21 %                                  

 



             (BEAKER) (test code = 430)                                        

 

             MONOCYTES RELATIVE PERCENT 6 %                                    



             (BEAKER) (test code = 431)                                        

 

             EOSINOPHILS RELATIVE PERCENT 1 %                                   

 



             (BEAKER) (test code = 432)                                        

 

             BASOPHILS RELATIVE PERCENT 1 %                                    



             (BEAKER) (test code = 437)                                        

 

             NEUTROPHILS ABSOLUTE COUNT 4.35 K/ L    1.78-5.38                 



             (BEAKER) (test code = 670)                                        

 

             LYMPHOCYTES ABSOLUTE COUNT 1.25 K/ L    1.32-3.57    L            



             (BEAKER) (test code = 414)                                        

 

             MONOCYTES ABSOLUTE COUNT (BEAKER) 0.38 K/ L    0.30-0.82           

      



             (test code = 415)                                        

 

             EOSINOPHILS ABSOLUTE COUNT 0.08 K/ L    0.04-0.54                 



             (BEAKER) (test code = 416)                                        

 

             BASOPHILS ABSOLUTE COUNT (BEAKER) 0.03 K/ L    0.01-0.08           

      



             (test code = 417)                                        

 

             IMMATURE GRANULOCYTES-RELATIVE 0 %          0-1                    

   



             PERCENT (BEAKER) (test code =                                      

  



             2801)                                               



CT, BRAIN, WITHOUT [id number] 10:26:00Unlisted Reason for Exam - Click 
Yes and Enter Reason Below-&gt;YesUnlisted Reason for Exam-&gt;SDH follow up 
imaging************************************************************[facility] : 1965 Sex: 
M************************************************************FINAL REPORT 
PATIENT ID: [id number] CT, BRAIN, WITHOUT CONTRAST CLINICAL INDICATION: Subdural 
hemorrhage, follow-upSDH follow up imaging COMPARISON: 2021 TECHNIQUE:
Noncontrast axial CT imaging of the brain and skull. DOSE REDUCTION: Dose 
modulation, iterative reconstruction, and/or weight-based adjustment of the 
mA/kV was utilized to reduce the radiation dose to as low as reasonably 
achievable. FINDINGS:Status post left frontal parietal craniotomy for drainage 
of subdural hematoma. Subdural drain has been removed in the interim. Residual 
left cerebral convexity mixed density hematoma and pneumocephalus is not 
significantly changed in thickness, measuring up to 2.2 cm. Contralateral mixed 
density right cerebral convexity extra-axial hematoma is similarly unchanged, 
measuring up to 6 mm. Midline shiftis not significantly changed in the interim 
given differences in angulation and technique, approximately 6 mm rightward 
midline shift. No herniation. Mild effacement of the left lateral ventricle is 
unchanged. No hydrocephalus. Orbits are within normal limits. No obstructive 
paranasal sinus disease. IMPRESSION: Interval removal of left subdural drain. 
Otherwise, no significant interval change in bilateral cerebral convexity mixed 
density subdural hematomas and mild consequent rightward midline shift. If there
is persistent clinical concern for intracranial pathology, MR examination is 
recommended for further characterization. Signed: Radha Lauraeport 
Verified Date/Time: 2021 10:26:18Reading Location: [address] [facility] 
Reading Room Electronically signed by: RADHA LAURA MD on 
2021 10:26 AMBASIC METABOLIC [id number] 06:08:00





             Test Item    Value        Reference Range Interpretation Comments

 

             SODIUM (BEAKER) 134 meq/L    136-145      L            



             (test code = 381)                                        

 

             POTASSIUM (BEAKER) 4.0 meq/L    3.5-5.1                   



             (test code = 379)                                        

 

             CHLORIDE (BEAKER) 99 meq/L                         



             (test code = 382)                                        

 

             CO2 (BEAKER) (test 25 meq/L     22-29                     



             code = 355)                                         

 

             BLOOD UREA NITROGEN 14 mg/dL     7-21                      



             (BEAKER) (test code                                        



             = 354)                                              

 

             CREATININE (BEAKER) 0.99 mg/dL   0.57-1.25                 



             (test code = 358)                                        

 

             GLUCOSE RANDOM 107 mg/dL           H            



             (BEAKER) (test code                                        



             = 652)                                              

 

             CALCIUM (BEAKER) 9.4 mg/dL    8.4-10.2                  



             (test code = 697)                                        

 

             EGFR (BEAKER) (test 79 mL/min/1.73                           ESTIMA

ALESSIA GFR IS



             code = 1092) sq m                                   NOT AS ACCURATE

 AS



                                                                 CREATININE



                                                                 CLEARANCE IN



                                                                 PREDICTING



                                                                 GLOMERULAR



                                                                 FILTRATION RATE

.



                                                                 ESTIMATED GFR I

S



                                                                 NOT APPLICABLE 

FOR



                                                                 DIALYSIS PATIEN

TS.



 ID - [id number] 06:08:00





             Test Item    Value        Reference Range Interpretation Comments

 

             MAGNESIUM (BEAKER) (test code = 2.0 mg/dL    1.6-2.6               

    



             627)                                                



 ID - [id number] 06:08:00





             Test Item    Value        Reference Range Interpretation Comments

 

             PHOSPHORUS (BEAKER) (test code = 3.8 mg/dL    2.3-4.7              

     



             604)                                                



 ID - DBCBC W/PLT COUNT &amp; AUTO [id number] 05:37:00





             Test Item    Value        Reference Range Interpretation Comments

 

             WHITE BLOOD CELL COUNT (BEAKER) 7.9 K/ L     3.5-10.5              

    



             (test code = 775)                                        

 

             RED BLOOD CELL COUNT (BEAKER) 4.60 M/ L    4.63-6.08    L          

  



             (test code = 761)                                        

 

             HEMOGLOBIN (BEAKER) (test code = 14.5 GM/DL   13.7-17.5            

     



             410)                                                

 

             HEMATOCRIT (BEAKER) (test code = 42.1 %       40.1-51.0            

     



             411)                                                

 

             MEAN CORPUSCULAR VOLUME (BEAKER) 91.5 fL      79.0-92.2            

     



             (test code = 753)                                        

 

             MEAN CORPUSCULAR HEMOGLOBIN 31.5 pg      25.7-32.2                 



             (BEAKER) (test code = 751)                                        

 

             MEAN CORPUSCULAR HEMOGLOBIN CONC 34.4 GM/DL   32.3-36.5            

     



             (BEAKER) (test code = 752)                                        

 

             RED CELL DISTRIBUTION WIDTH 12.9 %       11.6-14.4                 



             (BEAKER) (test code = 412)                                        

 

             PLATELET COUNT (BEAKER) (test 196 K/CU MM  150-450                 

  



             code = 756)                                         

 

             MEAN PLATELET VOLUME (BEAKER) 11.8 fL      9.4-12.4                

  



             (test code = 754)                                        

 

             NUCLEATED RED BLOOD CELLS 0 /100 WBC   0-0                       



             (BEAKER) (test code = 413)                                        

 

             NEUTROPHILS RELATIVE PERCENT 60 %                                  

 



             (BEAKER) (test code = 429)                                        

 

             LYMPHOCYTES RELATIVE PERCENT 28 %                                  

 



             (BEAKER) (test code = 430)                                        

 

             MONOCYTES RELATIVE PERCENT 8 %                                    



             (BEAKER) (test code = 431)                                        

 

             EOSINOPHILS RELATIVE PERCENT 2 %                                   

 



             (BEAKER) (test code = 432)                                        

 

             BASOPHILS RELATIVE PERCENT 1 %                                    



             (BEAKER) (test code = 437)                                        

 

             NEUTROPHILS ABSOLUTE COUNT 4.74 K/ L    1.78-5.38                 



             (BEAKER) (test code = 670)                                        

 

             LYMPHOCYTES ABSOLUTE COUNT 2.20 K/ L    1.32-3.57                 



             (BEAKER) (test code = 414)                                        

 

             MONOCYTES ABSOLUTE COUNT (BEAKER) 0.66 K/ L    0.30-0.82           

      



             (test code = 415)                                        

 

             EOSINOPHILS ABSOLUTE COUNT 0.18 K/ L    0.04-0.54                 



             (BEAKER) (test code = 416)                                        

 

             BASOPHILS ABSOLUTE COUNT (BEAKER) 0.05 K/ L    0.01-0.08           

      



             (test code = 417)                                        

 

             IMMATURE GRANULOCYTES-RELATIVE 0 %          0-1                    

   



             PERCENT (BEAKER) (test code =                                      

  



             2801)                                               



BASIC METABOLIC [id number] 04:37:00





             Test Item    Value        Reference Range Interpretation Comments

 

             SODIUM (BEAKER) 135 meq/L    136-145      L            



             (test code = 381)                                        

 

             POTASSIUM (BEAKER) 4.0 meq/L    3.5-5.1                   



             (test code = 379)                                        

 

             CHLORIDE (BEAKER) 100 meq/L                        



             (test code = 382)                                        

 

             CO2 (BEAKER) (test 25 meq/L     22-29                     



             code = 355)                                         

 

             BLOOD UREA NITROGEN 15 mg/dL     7-21                      



             (BEAKER) (test code                                        



             = 354)                                              

 

             CREATININE (BEAKER) 0.94 mg/dL   0.57-1.25                 



             (test code = 358)                                        

 

             GLUCOSE RANDOM 108 mg/dL           H            



             (BEAKER) (test code                                        



             = 652)                                              

 

             CALCIUM (BEAKER) 9.1 mg/dL    8.4-10.2                  



             (test code = 697)                                        

 

             EGFR (BEAKER) (test 84 mL/min/1.73                           ESTIMA

ALESSIA GFR IS



             code = 1092) sq m                                   NOT AS ACCURATE

 AS



                                                                 CREATININE



                                                                 CLEARANCE IN



                                                                 PREDICTING



                                                                 GLOMERULAR



                                                                 FILTRATION RATE

.



                                                                 ESTIMATED GFR I

S



                                                                 NOT APPLICABLE 

FOR



                                                                 DIALYSIS PATIEN

TS.



 ID - [id number] 04:37:00





             Test Item    Value        Reference Range Interpretation Comments

 

             MAGNESIUM (BEAKER) (test code = 2.1 mg/dL    1.6-2.6               

    



             627)                                                



 ID - [id number] 04:37:00





             Test Item    Value        Reference Range Interpretation Comments

 

             PHOSPHORUS (BEAKER) (test code = 3.7 mg/dL    2.3-4.7              

     



             604)                                                



 ID - EDASICBC W/PLT COUNT &amp; AUTO [id number] 04:25:00





             Test Item    Value        Reference Range Interpretation Comments

 

             WHITE BLOOD CELL COUNT (BEAKER) 6.7 K/ L     3.5-10.5              

    



             (test code = 775)                                        

 

             RED BLOOD CELL COUNT (BEAKER) 4.59 M/ L    4.63-6.08    L          

  



             (test code = 761)                                        

 

             HEMOGLOBIN (BEAKER) (test code = 14.5 GM/DL   13.7-17.5            

     



             410)                                                

 

             HEMATOCRIT (BEAKER) (test code = 43.7 %       40.1-51.0            

     



             411)                                                

 

             MEAN CORPUSCULAR VOLUME (BEAKER) 95.2 fL      79.0-92.2    H       

     



             (test code = 753)                                        

 

             MEAN CORPUSCULAR HEMOGLOBIN 31.6 pg      25.7-32.2                 



             (BEAKER) (test code = 751)                                        

 

             MEAN CORPUSCULAR HEMOGLOBIN CONC 33.2 GM/DL   32.3-36.5            

     



             (BEAKER) (test code = 752)                                        

 

             RED CELL DISTRIBUTION WIDTH 12.9 %       11.6-14.4                 



             (BEAKER) (test code = 412)                                        

 

             PLATELET COUNT (BEAKER) (test 175 K/CU MM  150-450                 

  



             code = 756)                                         

 

             MEAN PLATELET VOLUME (BEAKER) 11.6 fL      9.4-12.4                

  



             (test code = 754)                                        

 

             NUCLEATED RED BLOOD CELLS 0 /100 WBC   0-0                       



             (BEAKER) (test code = 413)                                        

 

             NEUTROPHILS RELATIVE PERCENT 60 %                                  

 



             (BEAKER) (test code = 429)                                        

 

             LYMPHOCYTES RELATIVE PERCENT 27 %                                  

 



             (BEAKER) (test code = 430)                                        

 

             MONOCYTES RELATIVE PERCENT 10 %                                   



             (BEAKER) (test code = 431)                                        

 

             EOSINOPHILS RELATIVE PERCENT 2 %                                   

 



             (BEAKER) (test code = 432)                                        

 

             BASOPHILS RELATIVE PERCENT 1 %                                    



             (BEAKER) (test code = 437)                                        

 

             NEUTROPHILS ABSOLUTE COUNT 4.00 K/ L    1.78-5.38                 



             (BEAKER) (test code = 670)                                        

 

             LYMPHOCYTES ABSOLUTE COUNT 1.79 K/ L    1.32-3.57                 



             (BEAKER) (test code = 414)                                        

 

             MONOCYTES ABSOLUTE COUNT (BEAKER) 0.69 K/ L    0.30-0.82           

      



             (test code = 415)                                        

 

             EOSINOPHILS ABSOLUTE COUNT 0.15 K/ L    0.04-0.54                 



             (BEAKER) (test code = 416)                                        

 

             BASOPHILS ABSOLUTE COUNT (BEAKER) 0.04 K/ L    0.01-0.08           

      



             (test code = 417)                                        

 

             IMMATURE GRANULOCYTES-RELATIVE 0 %          0-1                    

   



             PERCENT (BEAKER) (test code =                                      

  



             2801)                                               



CBC W/PLT COUNT &amp; AUTO [id number] 04:36:00





             Test Item    Value        Reference Range Interpretation Comments

 

             WHITE BLOOD CELL COUNT (BEAKER) 7.9 K/ L     3.5-10.5              

    



             (test code = 775)                                        

 

             RED BLOOD CELL COUNT (BEAKER) 4.26 M/ L    4.63-6.08    L          

  



             (test code = 761)                                        

 

             HEMOGLOBIN (BEAKER) (test code = 13.5 GM/DL   13.7-17.5    L       

     



             410)                                                

 

             HEMATOCRIT (BEAKER) (test code = 39.8 %       40.1-51.0    L       

     



             411)                                                

 

             MEAN CORPUSCULAR VOLUME (BEAKER) 93.4 fL      79.0-92.2    H       

     



             (test code = 753)                                        

 

             MEAN CORPUSCULAR HEMOGLOBIN 31.7 pg      25.7-32.2                 



             (BEAKER) (test code = 751)                                        

 

             MEAN CORPUSCULAR HEMOGLOBIN CONC 33.9 GM/DL   32.3-36.5            

     



             (BEAKER) (test code = 752)                                        

 

             RED CELL DISTRIBUTION WIDTH 13.2 %       11.6-14.4                 



             (BEAKER) (test code = 412)                                        

 

             PLATELET COUNT (BEAKER) (test 132 K/CU MM  150-450      L          

  



             code = 756)                                         

 

             MEAN PLATELET VOLUME (BEAKER) 11.1 fL      9.4-12.4                

  



             (test code = 754)                                        

 

             NUCLEATED RED BLOOD CELLS 0 /100 WBC   0-0                       



             (BEAKER) (test code = 413)                                        

 

             NEUTROPHILS RELATIVE PERCENT 64 %                                  

 



             (BEAKER) (test code = 429)                                        

 

             LYMPHOCYTES RELATIVE PERCENT 23 %                                  

 



             (BEAKER) (test code = 430)                                        

 

             MONOCYTES RELATIVE PERCENT 10 %                                   



             (BEAKER) (test code = 431)                                        

 

             EOSINOPHILS RELATIVE PERCENT 2 %                                   

 



             (BEAKER) (test code = 432)                                        

 

             BASOPHILS RELATIVE PERCENT 0 %                                    



             (BEAKER) (test code = 437)                                        

 

             NEUTROPHILS ABSOLUTE COUNT 5.11 K/ L    1.78-5.38                 



             (BEAKER) (test code = 670)                                        

 

             LYMPHOCYTES ABSOLUTE COUNT 1.86 K/ L    1.32-3.57                 



             (BEAKER) (test code = 414)                                        

 

             MONOCYTES ABSOLUTE COUNT (BEAKER) 0.77 K/ L    0.30-0.82           

      



             (test code = 415)                                        

 

             EOSINOPHILS ABSOLUTE COUNT 0.15 K/ L    0.04-0.54                 



             (BEAKER) (test code = 416)                                        

 

             BASOPHILS ABSOLUTE COUNT (BEAKER) 0.03 K/ L    0.01-0.08           

      



             (test code = 417)                                        

 

             IMMATURE GRANULOCYTES-RELATIVE 0 %          0-1                    

   



             PERCENT (BEAKER) (test code =                                      

  



             2801)                                               



[id number] 03:54:00





             Test Item    Value        Reference Range Interpretation Comments

 

             MAGNESIUM (BEAKER) 2.0 mg/dL    1.6-2.6                   Specimen 

slightly



             (test code = 627)                                        hemolyzed



 ID - LOGAN [id number] 03:54:00





             Test Item    Value        Reference Range Interpretation Comments

 

             PHOSPHORUS (BEAKER) 3.4 mg/dL    2.3-4.7                   Specimen

 slightly



             (test code = 604)                                        hemolyzed



 ID - LOGAN MBASIC METABOLIC [id number] 03:54:00





             Test Item    Value        Reference Range Interpretation Comments

 

             SODIUM (BEAKER) 134 meq/L    136-145      L            



             (test code = 381)                                        

 

             POTASSIUM (BEAKER) 4.0 meq/L    3.5-5.1                   Specimen 

slightly



             (test code = 379)                                        hemolyzed

 

             CHLORIDE (BEAKER) 103 meq/L                        



             (test code = 382)                                        

 

             CO2 (BEAKER) (test 20 meq/L     22-29        L            



             code = 355)                                         

 

             BLOOD UREA NITROGEN 16 mg/dL     7-21                      



             (BEAKER) (test code                                        



             = 354)                                              

 

             CREATININE (BEAKER) 0.92 mg/dL   0.57-1.25                 Specimen

 slightly



             (test code = 358)                                        hemolyzed

 

             GLUCOSE RANDOM 99 mg/dL                         



             (BEAKER) (test code                                        



             = 652)                                              

 

             CALCIUM (BEAKER) 8.7 mg/dL    8.4-10.2                  



             (test code = 697)                                        

 

             EGFR (BEAKER) (test 86 mL/min/1.73                           ESTIMA

ALESSIA GFR IS



             code = 1092) sq m                                   NOT AS ACCURATE

 AS



                                                                 CREATININE



                                                                 CLEARANCE IN



                                                                 PREDICTING



                                                                 GLOMERULAR



                                                                 FILTRATION RATE

.



                                                                 ESTIMATED GFR I

S



                                                                 NOT APPLICABLE 

FOR



                                                                 DIALYSIS PATIEN

TS.



 ID - LOGAN MCT, BRAIN, WITHOUT [id number] 07:26:00Unlisted 
Reason for Exam - Click Yes and Enter Reason Below-&gt;No
************************************************************[facility]UE[facility]AR : 1965 Sex: 
M************************************************************FINAL REPORT 
PATIENT ID: [id number] CT Head without contrast CLINICAL HISTORY: Cerebral 
hemorrhage suspected TECHNIQUE: Contiguous axial CT images through the head 
without contrast. This exam was performed accordingto the departmental dose 
optimization program which includes automated exposure control, adjustment of 
the mA and/or kV according to the patient size, and/or use of an iterative 
reconstruction technique. COMPARISON: 3/30/2021 FINDINGS: There is interval 
left-sided craniotomy and extra-axial drainage catheter placement with 
subsegmental decrease in size of the left cerebral convexity subdural hematoma
now measuring 0.8 cm in thickness, previously 2.1 cm. Pneumocephalus is 
compatible with the recent postsurgical status. There is decreased mass effect 
compressing the left lateral ventricle with decreased rightward midline shift of
a cavum septum pellucidum measuring 0.5 cm, previously 1.0 cm. Asymmetric 
dilatation of the right temporal horn has subsided. Medial left uncal deviation 
and effacement ofthe ambient cisterns is also decreased. There is a new right 
cerebral vertex subdural hemorrhage measuring 4 mm in thickness, without 
significant mass effect. There is no CT evidence for acute infarction. 
IMPRESSION: Since 3/30/2021, status post decompression of the left-sided 
subdural hematoma with substantially decreased mass effect. Interval development
of a thin right cerebral vertex subdural hemorrhage measuring 4 mm in thickness,
for which attention on follow-up is recommended. Signed: Theodora Marroquin MDReport 
Verified Date/Time: 2021 07:26:39 Reading Location: [address] Neuro 
Reading Room Electronically signed by: THEODORA MARROQUIN M.D. on 2021 07:26 
AMBASIC METABOLIC [id number] 04:40:00





             Test Item    Value        Reference Range Interpretation Comments

 

             SODIUM (BEAKER) 135 meq/L    136-145      L            



             (test code = 381)                                        

 

             POTASSIUM (BEAKER) 4.0 meq/L    3.5-5.1                   



             (test code = 379)                                        

 

             CHLORIDE (BEAKER) 103 meq/L                        



             (test code = 382)                                        

 

             CO2 (BEAKER) (test 24 meq/L     22-29                     



             code = 355)                                         

 

             BLOOD UREA NITROGEN 14 mg/dL     7-21                      



             (BEAKER) (test code                                        



             = 354)                                              

 

             CREATININE (BEAKER) 1.00 mg/dL   0.57-1.25                 



             (test code = 358)                                        

 

             GLUCOSE RANDOM 113 mg/dL           H            



             (BEAKER) (test code                                        



             = 652)                                              

 

             CALCIUM (BEAKER) 8.9 mg/dL    8.4-10.2                  



             (test code = 697)                                        

 

             EGFR (BEAKER) (test 78 mL/min/1.73                           ESTIMA

ALESSIA GFR IS



             code = 1092) sq m                                   NOT AS ACCURATE

 AS



                                                                 CREATININE



                                                                 CLEARANCE IN



                                                                 PREDICTING



                                                                 GLOMERULAR



                                                                 FILTRATION RATE

.



                                                                 ESTIMATED GFR I

S



                                                                 NOT APPLICABLE 

FOR



                                                                 DIALYSIS PATIEN

TS.



 ID - LOGAN [id number] 04:40:00





             Test Item    Value        Reference Range Interpretation Comments

 

             MAGNESIUM (BEAKER) (test code = 1.9 mg/dL    1.6-2.6               

    



             627)                                                



 ID - LOGAN [id number] 04:40:00





             Test Item    Value        Reference Range Interpretation Comments

 

             PHOSPHORUS (BEAKER) (test code = 3.5 mg/dL    2.3-4.7              

     



             604)                                                



 ID - LOGAN MCBC W/PLT COUNT &amp; AUTO [id number] 04:27:00





             Test Item    Value        Reference Range Interpretation Comments

 

             WHITE BLOOD CELL COUNT (BEAKER) 10.8 K/ L    3.5-10.5     H        

    



             (test code = 775)                                        

 

             RED BLOOD CELL COUNT (BEAKER) 4.48 M/ L    4.63-6.08    L          

  



             (test code = 761)                                        

 

             HEMOGLOBIN (BEAKER) (test code = 14.2 GM/DL   13.7-17.5            

     



             410)                                                

 

             HEMATOCRIT (BEAKER) (test code = 41.9 %       40.1-51.0            

     



             411)                                                

 

             MEAN CORPUSCULAR VOLUME (BEAKER) 93.5 fL      79.0-92.2    H       

     



             (test code = 753)                                        

 

             MEAN CORPUSCULAR HEMOGLOBIN 31.7 pg      25.7-32.2                 



             (BEAKER) (test code = 751)                                        

 

             MEAN CORPUSCULAR HEMOGLOBIN CONC 33.9 GM/DL   32.3-36.5            

     



             (BEAKER) (test code = 752)                                        

 

             RED CELL DISTRIBUTION WIDTH 12.9 %       11.6-14.4                 



             (BEAKER) (test code = 412)                                        

 

             PLATELET COUNT (BEAKER) (test 157 K/CU MM  150-450                 

  



             code = 756)                                         

 

             MEAN PLATELET VOLUME (BEAKER) 11.8 fL      9.4-12.4                

  



             (test code = 754)                                        

 

             NUCLEATED RED BLOOD CELLS 0 /100 WBC   0-0                       



             (BEAKER) (test code = 413)                                        

 

             NEUTROPHILS RELATIVE PERCENT 78 %                                  

 



             (BEAKER) (test code = 429)                                        

 

             LYMPHOCYTES RELATIVE PERCENT 13 %                                  

 



             (BEAKER) (test code = 430)                                        

 

             MONOCYTES RELATIVE PERCENT 8 %                                    



             (BEAKER) (test code = 431)                                        

 

             EOSINOPHILS RELATIVE PERCENT 0 %                                   

 



             (BEAKER) (test code = 432)                                        

 

             BASOPHILS RELATIVE PERCENT 0 %                                    



             (BEAKER) (test code = 437)                                        

 

             NEUTROPHILS ABSOLUTE COUNT 8.44 K/ L    1.78-5.38    H            



             (BEAKER) (test code = 670)                                        

 

             LYMPHOCYTES ABSOLUTE COUNT 1.40 K/ L    1.32-3.57                 



             (BEAKER) (test code = 414)                                        

 

             MONOCYTES ABSOLUTE COUNT (BEAKER) 0.91 K/ L    0.30-0.82    H      

      



             (test code = 415)                                        

 

             EOSINOPHILS ABSOLUTE COUNT 0.01 K/ L    0.04-0.54    L            



             (BEAKER) (test code = 416)                                        

 

             BASOPHILS ABSOLUTE COUNT (BEAKER) 0.01 K/ L    0.01-0.08           

      



             (test code = 417)                                        

 

             IMMATURE GRANULOCYTES-RELATIVE 0 %          0-1                    

   



             PERCENT (BEAKER) (test code =                                      

  



             2801)                                               



ECG 12 [id number] 13:59:49Interface, External Ris In - 2021 1:59 PM 
CDTVentricular Rate 50 BPMAtrial Rate 50 BPMP-R Interval 194 msQRS Duration 94 
msQ-T Interval 430 msQTC Calculation(Bazett) 392 msP Axis 31 degreesR Axis-7 
degreesT Axis 6 degreesSinus bradycardiaOtherwise normal ECGNo previous ECGs 
availableConfirmed by MD Amezcua Roberto ([id number]) on 3/30/2021 1:59:44 PM
[facility] If [id number] 08:07:00





             Test Item    Value        Reference Range Interpretation Comments

 

             TSH (test code = 2.822        See_Comment                [Automated



             80911-3)                                            message] The



                                                                 system which



                                                                 generated this



                                                                 result transmit

alessia



                                                                 reference range

:



                                                                 0.350 - 4.940



                                                                 uIU/mL. The



                                                                 reference range



                                                                 was not used to



                                                                 interpret this



                                                                 result as



                                                                 normal/abnormal

.

 

             SCOOTER (test code = SCOOTER)  ID -                           



                          CAMILLA L                                

 

             Lab Interpretation Normal                                 



             (test code = 59909-6)                                        



[facility] [id number] 08:07:00





             Test Item    Value        Reference Range Interpretation Comments

 

             THYROID STIMULATING HORMONE 2.822 uIU/mL 0.350-4.940               



             (BEAKER) (test code = 772)                                        



 ID - CAMILLA LCT, BRAIN, WITHOUT [id number] 08:05:00Followup from
[place] scan. Previously read as 2cm diameter with 12-13mm MLSUnlisted Reason
for Exam - Click Yes and Enter Reason Below-&gt;No
************************************************************[facility] : 1965 Sex: 
M************************************************************FINAL REPORT 
PATIENT ID: [id number] CT Head without contrast CLINICAL HISTORY: Subdural 
hematoma TECHNIQUE: Contiguous axial CT images through the head without 
contrast. This exam was performed according to the departmental dose 
optimization program which includes automated exposure control, adjustment of 
the mA and/or kV according to the patient size, and/or use of an iterative 
reconstruction technique. COMPARISON: None FINDINGS: There is a left cerebral 
convexity subdural hematoma measuring 2.1 cm in thickness with a layering 
hematocrit level. There is mass effect compressing the left cerebral hemisphere 
and narrowing the left lateral ventricle. There is rightward midline shift of 
the septum pellucidum by 1.0cm. Asymmetric dilatation of the right temporal horn
is compatible with early/partial entrapment. Medial left uncal deviation is also
seen effacing the bilateral ambient cisterns with rightward brainstem 
displacement. There is no CT evidence for acute ischemia. There is no skull 
fracture. IMPRESSION: Left cerebral convexity subdural hematoma with mass effect
and rightward midline shift including uncal deviation. Asymmetric dilatation of 
the right temporal horn compatible with early/partial ventricular entrapment. 
Given the stated history of subdural hematoma, comparison with outside imaging 
is recommended to determine stability or change. Signed: Theodora Marroquin MDReport 
Verified Date/Time: 2021 08:05:59 Reading Location: [facility] B1 C013V Neuro 
Reading Room Electronically signed by: THEODORA MARROQUIN M.D. on 2021 08:05 
[facility] -[id number] 04:24:00





             Test Item    Value        Reference Range Interpretation Comments

 

             Troponin I (test code = <0.01        0-0.03                    



             55753-6)                                            

 

             SCOOTER (test code = SCOOTER) Troponin I (TnI)                           



                          levels must be                           



                          interpreted in the                           



                          context of the                           



                          presenting symptoms                           



                          and the clinical                           



                          findings. Elevated                           



                          TnI levels indicate                           



                          myocardial damage,                           



                          but are not specific                           



                          for ischemic heart                           



                          disease. Elevated TnI                           



                          levels are seen in                           



                          patients with other                           



                          cardiac conditions                           



                          (including                             



                          myocarditis and                           



                          congestive heart                           



                          failure), and slight                           



                          TnI elevations occur                           



                          in patients with                           



                          other conditions,                           



                          including sepsis,                           



                          renal failure,                           



                          acidosis, acute                           



                          neurological disease,                           



                          and persistent                           



                          tachyarrhythmia.Opera                           



                          tor ID - PIAYA L                           

 

             Lab Interpretation (test Normal                                 



             code = 16227-4)                                        



[id number]24[facility]00





             Test Item    Value        Reference Range Interpretation Comments

 

             TROPONIN I (BEAKER) (test code = 397) < ng/mL      0.00-0.03       

          








             Test Item    Value        Reference Range Interpretation Comments

 

             Triglycerides (test 184 mg/dL                              



             code = 2571-8)                                        

 

             Cholesterol (test code 193 mg/dL                              



             = 2093-3)                                           

 

             HDL (test code = 34 mg/dL                               



             2085-9)                                             

 

             LDL Calculated (test 122 mg/dL                              



             code = 85857-9)                                        

 

             SCOOTER (test code = SCOOTER) Triglyceride Reference                       

    



                          Range: Low Risk <150                           



                          Borderline 150-199                           



                          High Risk 200-499 Very                           



                          High Risk &gt;=500                           



                          Cholesterol Reference                           



                          Range: Low Risk <200                           



                          Borderline 200-239                           



                          High Risk >240 HDL                           



                          Cholesterol Reference                           



                          Range: Low Risk >=60                           



                          High Risk <40 LDL                           



                          Cholesterol Reference                           



                          Range: Optimal <100                           



                          Near Optimal 100-129                           



                          Borderline 130-159                           



                          High 160-189 Very High                           



                          >=190  ELVIRA SAMPSON DANTE MESSINA [facility] [id number] 04:22:00





             Test Item    Value        Reference Range Interpretation Comments

 

             SODIUM (BEAKER) 138 meq/L    136-145                   



             (test code = 381)                                        

 

             POTASSIUM (BEAKER) 4.2 meq/L    3.5-5.1                   



             (test code = 379)                                        

 

             CHLORIDE (BEAKER) 107 meq/L                        



             (test code = 382)                                        

 

             CO2 (BEAKER) (test 24 meq/L     22-29                     



             code = 355)                                         

 

             BLOOD UREA NITROGEN 15 mg/dL     7-21                      



             (BEAKER) (test code                                        



             = 354)                                              

 

             CREATININE (BEAKER) 0.92 mg/dL   0.57-1.25                 



             (test code = 358)                                        

 

             GLUCOSE RANDOM 97 mg/dL                         



             (BEAKER) (test code                                        



             = 652)                                              

 

             CALCIUM (BEAKER) 8.3 mg/dL    8.4-10.2     L            



             (test code = 697)                                        

 

             EGFR (BEAKER) (test 86 mL/min/1.73                           ESTIMA

ALESSIA GFR IS



             code = 1092) sq m                                   NOT AS ACCURATE

 AS



                                                                 CREATININE



                                                                 CLEARANCE IN



                                                                 PREDICTING



                                                                 GLOMERULAR



                                                                 FILTRATION RATE

.



                                                                 ESTIMATED GFR I

S



                                                                 NOT APPLICABLE 

FOR



                                                                 DIALYSIS PATIEN

TS.



 ID - CAMILLA [id number] 04:22:00





             Test Item    Value        Reference Range Interpretation Comments

 

             MAGNESIUM (BEAKER) (test code = 2.1 mg/dL    1.6-2.6               

    



             627)                                                



 ID - CAMILLA [id number] 04:22:00





             Test Item    Value        Reference Range Interpretation Comments

 

             PHOSPHORUS (BEAKER) (test code = 3.7 mg/dL    2.3-4.7              

     



             604)                                                



 ID Corbin SAMPSON LLIPID [id number] 04:22:00





             Test Item    Value        Reference Range Interpretation Comments

 

             TRIGLYCERIDES (BEAKER) (test code = 184 mg/dL                      

        



             540)                                                

 

             CHOLESTEROL (BEAKER) (test code = 193 mg/dL                        

      



             631)                                                

 

             HDL CHOLESTEROL (BEAKER) (test code 34 mg/dL                       

        



             = 976)                                              

 

             LDL CHOLESTEROL CALCULATED (BEAKER) 122 mg/dL                      

        



             (test code = 633)                                        



Triglyceride Reference Range: Low Risk &lt;150 Borderline 150-199 High Risk 200-
499 Very High Risk &gt;=500Cholesterol Reference Range: Low Risk  &lt;200 
Borderline 200-239 High Risk &gt;240HDL Cholesterol Reference Range: Low Risk 
&gt;=60 High Risk &lt;40LDL Cholesterol Reference Range: Optimal  &lt;100 Near 
Optimal 100-129 Borderline 130-159 High 160-189 Very High &gt;=190  ID -
PIAYA LCBC W/PLT COUNT &amp; AUTO [id number] 04:02:00





             Test Item    Value        Reference Range Interpretation Comments

 

             WHITE BLOOD CELL COUNT (BEAKER) 5.6 K/ L     3.5-10.5              

    



             (test code = 775)                                        

 

             RED BLOOD CELL COUNT (BEAKER) 4.29 M/ L    4.63-6.08    L          

  



             (test code = 761)                                        

 

             HEMOGLOBIN (BEAKER) (test code = 13.6 GM/DL   13.7-17.5    L       

     



             410)                                                

 

             HEMATOCRIT (BEAKER) (test code = 40.0 %       40.1-51.0    L       

     



             411)                                                

 

             MEAN CORPUSCULAR VOLUME (BEAKER) 93.2 fL      79.0-92.2    H       

     



             (test code = 753)                                        

 

             MEAN CORPUSCULAR HEMOGLOBIN 31.7 pg      25.7-32.2                 



             (BEAKER) (test code = 751)                                        

 

             MEAN CORPUSCULAR HEMOGLOBIN CONC 34.0 GM/DL   32.3-36.5            

     



             (BEAKER) (test code = 752)                                        

 

             RED CELL DISTRIBUTION WIDTH 13.0 %       11.6-14.4                 



             (BEAKER) (test code = 412)                                        

 

             PLATELET COUNT (BEAKER) (test 119 K/CU MM  150-450      L          

  



             code = 756)                                         

 

             MEAN PLATELET VOLUME (BEAKER) 11.5 fL      9.4-12.4                

  



             (test code = 754)                                        

 

             NUCLEATED RED BLOOD CELLS 0 /100 WBC   0-0                       



             (BEAKER) (test code = 413)                                        

 

             NEUTROPHILS RELATIVE PERCENT 60 %                                  

 



             (BEAKER) (test code = 429)                                        

 

             LYMPHOCYTES RELATIVE PERCENT 29 %                                  

 



             (BEAKER) (test code = 430)                                        

 

             MONOCYTES RELATIVE PERCENT 7 %                                    



             (BEAKER) (test code = 431)                                        

 

             EOSINOPHILS RELATIVE PERCENT 3 %                                   

 



             (BEAKER) (test code = 432)                                        

 

             BASOPHILS RELATIVE PERCENT 1 %                                    



             (BEAKER) (test code = 437)                                        

 

             NEUTROPHILS ABSOLUTE COUNT 3.33 K/ L    1.78-5.38                 



             (BEAKER) (test code = 670)                                        

 

             LYMPHOCYTES ABSOLUTE COUNT 1.61 K/ L    1.32-3.57                 



             (BEAKER) (test code = 414)                                        

 

             MONOCYTES ABSOLUTE COUNT (BEAKER) 0.40 K/ L    0.30-0.82           

      



             (test code = 415)                                        

 

             EOSINOPHILS ABSOLUTE COUNT 0.17 K/ L    0.04-0.54                 



             (BEAKER) (test code = 416)                                        

 

             BASOPHILS ABSOLUTE COUNT (BEAKER) 0.03 K/ L    0.01-0.08           

      



             (test code = 417)                                        

 

             IMMATURE GRANULOCYTES-RELATIVE 0 %          0-1                    

   



             PERCENT (BEAKER) (test code =                                      

  



             2801)                                               



SARS-CoV2/RT-PCR (Asymptomatic ONLY)2021 23:03:00





             Test Item    Value        Reference Range Interpretation Comments

 

             SARS-COV2/RT-PCR Negative     Not Detected,              



             (test code =              Negative, See              



             40061-2)                  external report              



                                       for linked test              

 

             SARS-COV-2   [facility]                                  



             PERFORMING LAB                                        



             (test code =                                        



             17422-2)                                            

 

             SCOOTER (test code = Negative results do not                           



             SCOOTER)         preclude SARS-CoV-2                           



                          infection and should not                           



                          be used as the sole                           



                          basis for patient                           



                          management decisions.                           



                          Negative results must be                           



                          combined with clinical                           



                          observations, patient                           



                          history, and                           



                          epidemiological                           



                          information. A false                           



                          negative result may                           



                          occur if a specimen is                           



                          improperly collected,                           



                          transported or handled.                           



                          The limit of detection                           



                          for this assay is 250                           



                          copies/mL. This SARS                           



                          CoV-2 test is a rapid,                           



                          real-time RT-PCR test                           



                          intended for the                           



                          qualitative detection of                           



                          nucleic acid from                           



                          SARS-CoV-2 in a                           



                          nasopharyngeal swab                           



                          specimen collected from                           



                          individuals suspected of                           



                          COVID-19 by their                           



                          healthcare provider.                           



                          This test has not been                           



                          Food and Drug                           



                          Administration (FDA)                           



                          cleared or approved and                           



                          has been authorized by                           



                          FDA under an Emergency                           



                          Use Authorization (EUA).                           



                          This EUA will be                           



                          effective until the                           



                          declaration that                           



                          circumstances exist                           



                          justifying the                           



                          authorization of the                           



                          emergency use of in                           



                          vitro diagnostic tests                           



                          for detection and/or                           



                          diagnosis of COVID-19 is                           



                          terminated under Section                           



                          564(b)(2) of the Act or                           



                          the EUA is revoked under                           



                          Section 564(g) of the                           



                          Act. Fact Sheet for                           



                          Healthcare                             



                          Providers:https://www.[employer].com/Documents/Xper                           



                          t%20Xpress%20SARS%20CoV-                           



                          2/Fact%20Sheets/302-3802                           



                          %26YETJ-WVN-5%20HEALTHCA                           



                          RE%20PROVIDERS%20FACT%20                           



                          SHEET.pdf Fact Sheet for                           



                          Healthcare                             



                          Patients:https://www.[employer]/Documents/Xpert                           



                          %20Xpress%20SARS%20CoV-2                           



                          /Fact%20Sheets/302-3801%                           



                          83KBED-LMU-5%20PATIENT%2                           



                          0FACT%20SHEET.pdf                           



                          Performing                             



                          Laboratory:[facility] [place] [address]                           



[facility]2021 
23:03:00





             Test Item    Value        Reference Range Interpretation Comments

 

             SARS-COV2/RT-PCR (test code Negative     Not Detected, Negative,   

           



             = 4239630)                See external report for              



                                       linked test               

 

             SARS-COV-2 PERFORMING LAB [facility]                                  



             (test code = 0170545)                                        



Negative results do not preclude SARS-CoV-2 infection and should not be used as 
the sole basis for patient management decisions. Negative results must be 
combined with clinical observations, patient history, and epidemiological 
information. A false negative result may occur if a specimen is improperly
collected, transported or handled.The limit of detection for this assay is 250 
copies/mL.This SARS CoV-2 test is a rapid, real-time RT-PCR test intended for 
the qualitative detection of nucleic acid from SARS-CoV-2 in a nasopharyngeal 
swab specimen collected from individuals suspected of COVID-19 by their 
healthcare provider.This test has not been Food and Drug Administration (FDA) 
cleared or approved and has been authorized by FDA under an Emergency Use 
Authorization (EUA). This EUA will be effective until the declaration that 
circumstances exist justifying the authorization of the emergency use of in 
vitro diagnostic tests for detection and/or diagnosis of COVID-19 is terminated 
under Section 564(b)(2) of the Act or the EUA is revoked under Section 564(g) of
the Act.Fact Sheet for Healthcare Pro
viders:https://www.[employer]/Documents/Xpert%20Xpress%20SARS%20CoV-2/Fact%20Sh

eets/302-3802%12BGCZ-XUM-0%20HEALTHCARE%20PROVIDERS%20FACT%20SHEET.pdfFact Sheet
for Healthcare Patients:https://www.[employer]/Documents/Xpert%20Xpress%20SARS%20CoV-2/Fact%20Sheets/302-3801%20SARS-COV

-2%20PATIENT%20FACT%20SHEET.pdfPerforming Laboratory:[facility], [place] [address]WJXWB, [id number] 22:13:00





             Test Item    Value        Reference Range Interpretation Comments

 

             ABO Grouping (test code = 2588) O                                  

    

 

             Rh Factor (test code = 2589) POS                                   

 



[facility] [id number] 21:59:00





             Test Item    Value        Reference Range Interpretation Comments

 

             ABO/RH AUTOMATED (BEAKER) (test O POSITIVE                         

    



             code = 2260)                                        

 

             Ab Scrn (test code = 890-4) NEGATIVE                               



[facility]





             Test Item    Value        Reference Range Interpretation Comments

 

             PARTIAL THROMBOPLASTIN TIME 32.8 seconds 22.5-36.0                 



             (BEAKER) (test code = 760)                                        



[id number] 21:56:00





             Test Item    Value        Reference Range Interpretation Comments

 

             FIBRINOGEN LEVEL (BEAKER) (test 289 mg/dl    225-434               

    



             code = 658)                                         



PROTHROMBIN TIME/[id number] 21:55:00





             Test Item    Value        Reference Range Interpretation Comments

 

             PROTIME (BEAKER) 13.7 seconds 11.9-14.2                 



             (test code = 759)                                        

 

             INR (BEAKER) (test 1.10         See_Comment                [Automat

ed message]



             code = 370)                                         The system [employer]



                                                                 generated this 

result



                                                                 transmitted ref

erence



                                                                 range: <=5.90. 

The



                                                                 reference range

 was



                                                                 not used to int

erpret



                                                                 this result as



                                                                 normal/abnormal

.



Effective 2019: PT Reference Range ChangeNew: 11.9-14.2 Previous: 11.7-
14.7RECOMMENDED COUMADIN/WARFARIN INR THERAPY RANGESSTANDARD DOSE: 2.0-3.0 
Includes: PROPHYLAXIS for venous thrombosis, systemic embolization; TREATMENT 
for venous thrombosis and/or pulmonary embolus.HIGH RISK: Target INR is 2.5-3.5 
for patients wiht mechanical heart valves.BASIC METABOLIC [id number] 
21:45:00





             Test Item    Value        Reference Range Interpretation Comments

 

             SODIUM (BEAKER) 140 meq/L    136-145                   



             (test code = 381)                                        

 

             POTASSIUM (BEAKER) 4.2 meq/L    3.5-5.1                   



             (test code = 379)                                        

 

             CHLORIDE (BEAKER) 107 meq/L                        



             (test code = 382)                                        

 

             CO2 (BEAKER) (test 24 meq/L     22-29                     



             code = 355)                                         

 

             BLOOD UREA NITROGEN 13 mg/dL     7-21                      



             (BEAKER) (test code                                        



             = 354)                                              

 

             CREATININE (BEAKER) 0.93 mg/dL   0.57-1.25                 



             (test code = 358)                                        

 

             GLUCOSE RANDOM 92 mg/dL                         



             (BEAKER) (test code                                        



             = 652)                                              

 

             CALCIUM (BEAKER) 8.9 mg/dL    8.4-10.2                  



             (test code = 697)                                        

 

             EGFR (BEAKER) (test 85 mL/min/1.73                           ESTIMA

ALESSIA GFR IS



             code = 1092) sq m                                   NOT AS ACCURATE

 AS



                                                                 CREATININE



                                                                 CLEARANCE IN



                                                                 PREDICTING



                                                                 GLOMERULAR



                                                                 FILTRATION RATE

.



                                                                 ESTIMATED GFR I

S



                                                                 NOT APPLICABLE 

FOR



                                                                 DIALYSIS PATIEN

TS.



 ID - [id number] 21:45:00





             Test Item    Value        Reference Range Interpretation Comments

 

             MAGNESIUM (BEAKER) (test code = 2.0 mg/dL    1.6-2.6               

    



             627)                                                



 ID - [id number] 21:45:00





             Test Item    Value        Reference Range Interpretation Comments

 

             PHOSPHORUS (BEAKER) (test code = 3.8 mg/dL    2.3-4.7              

     



             604)                                                



 ID - DBCBC W/PLT COUNT &amp; AUTO [id number] 21:29:00





             Test Item    Value        Reference Range Interpretation Comments

 

             WHITE BLOOD CELL COUNT (BEAKER) 5.5 K/ L     3.5-10.5              

    



             (test code = 775)                                        

 

             RED BLOOD CELL COUNT (BEAKER) 4.46 M/ L    4.63-6.08    L          

  



             (test code = 761)                                        

 

             HEMOGLOBIN (BEAKER) (test code = 14.1 GM/DL   13.7-17.5            

     



             410)                                                

 

             HEMATOCRIT (BEAKER) (test code = 42.2 %       40.1-51.0            

     



             411)                                                

 

             MEAN CORPUSCULAR VOLUME (BEAKER) 94.6 fL      79.0-92.2    H       

     



             (test code = 753)                                        

 

             MEAN CORPUSCULAR HEMOGLOBIN 31.6 pg      25.7-32.2                 



             (BEAKER) (test code = 751)                                        

 

             MEAN CORPUSCULAR HEMOGLOBIN CONC 33.4 GM/DL   32.3-36.5            

     



             (BEAKER) (test code = 752)                                        

 

             RED CELL DISTRIBUTION WIDTH 12.7 %       11.6-14.4                 



             (BEAKER) (test code = 412)                                        

 

             PLATELET COUNT (BEAKER) (test 121 K/CU MM  150-450      L          

  



             code = 756)                                         

 

             MEAN PLATELET VOLUME (BEAKER) 12.0 fL      9.4-12.4                

  



             (test code = 754)                                        

 

             NUCLEATED RED BLOOD CELLS 0 /100 WBC   0-0                       



             (BEAKER) (test code = 413)                                        

 

             NEUTROPHILS RELATIVE PERCENT 55 %                                  

 



             (BEAKER) (test code = 429)                                        

 

             LYMPHOCYTES RELATIVE PERCENT 34 %                                  

 



             (BEAKER) (test code = 430)                                        

 

             MONOCYTES RELATIVE PERCENT 7 %                                    



             (BEAKER) (test code = 431)                                        

 

             EOSINOPHILS RELATIVE PERCENT 4 %                                   

 



             (BEAKER) (test code = 432)                                        

 

             BASOPHILS RELATIVE PERCENT 1 %                                    



             (BEAKER) (test code = 437)                                        

 

             NEUTROPHILS ABSOLUTE COUNT 3.03 K/ L    1.78-5.38                 



             (BEAKER) (test code = 670)                                        

 

             LYMPHOCYTES ABSOLUTE COUNT 1.84 K/ L    1.32-3.57                 



             (BEAKER) (test code = 414)                                        

 

             MONOCYTES ABSOLUTE COUNT (BEAKER) 0.39 K/ L    0.30-0.82           

      



             (test code = 415)                                        

 

             EOSINOPHILS ABSOLUTE COUNT 0.19 K/ L    0.04-0.54                 



             (BEAKER) (test code = 416)                                        

 

             BASOPHILS ABSOLUTE COUNT (BEAKER) 0.03 K/ L    0.01-0.08           

      



             (test code = 417)                                        

 

             IMMATURE GRANULOCYTES-RELATIVE 0 %          0-1                    

   



             PERCENT (BEAKER) (test code =                                      

  



             2801)                                               



RAD, CHEST, 1 VIEW, NON [id number] 21:19:00Reason for exam:-&gt;SDHShould 
this be performed at the bedside?-&gt;Yes
************************************************************[facility] : 1965 Sex: 
M************************************************************FINAL REPORT 
PATIENT ID: [id number] Chest one view. Clinical history: SDH Comparison: None. 
Technique: A single frontal view of the chest was obtained. Findings: The 
cardiac silhouette is normal in size. The aorta is tortuous and atherosclerotic.
There is no focal pulmonary consolidation, pleural effusion or pneumothorax. 
There is no pulmonary edema. There are mild degenerative changes of the 
bilateral acromioclavicular joints. There is a small right subacromial spur. 
Impression: No focal pulmonary consolidation.Signed: Chase Rhoades 
Verified Date/Time: 2021 21:19:03 Electronically signed by: CHASE RHOADES MD on 2021 09:19 PMXR chest 1 view portable / [id number] 
21:19:00Interface, External Ris In - 2021 9:21 PM CDTFINAL REPORT PATIENT 
ID: [id number] Chest one view.Clinical history: SDH Comparison: None. Technique: A
single frontal view of the chest was obtained. Findings: The cardiac silhouette 
is normal in size. The aorta is tortuous and atherosclerotic. There is no focal 
pulmonary consolidation, pleural effusion or pneumothorax. There is no pulmonary
edema. There are mild degenerative changes of the bilateral acromioclavicular 
joints. There is a small right subacromial spur. Impression: No focal pulmonary 
consolidation. Signed: Chase Rhoades Verified Date/Time: 2021 
21:19:03 Electronically signed by: CHASE RHOADES MD on 2021 09:19 
[facility]

## 2022-11-07 NOTE — EDPHYS
Physician Documentation                                                                           

 [facility]                                                                 

Name: Michael Cuellar                                                                                 

Age: 57 yrs                                                                                       

Sex: Male                                                                                         

: 1965                                                                                   

MRN: [id number]                                                                                   

Arrival Date: 2022                                                                          

Time: 02:59                                                                                       

Account#: [id number]                                                                            

Bed 20                                                                                            

Private MD:                                                                                       

ED Physician Ashok Junior                                                                       

HPI:                                                                                              

                                                                                             

07:45 This 57 yrs old  Male presents to ER via Ambulatory with complaints of Covid    kdr 

      Symptoms.                                                                                   

07:45 Patient awoke this morning with COVID symptoms. He had been exposed to his wife who was kdr 

      recently diagnosed with COVID. He also had some nausea vomiting and diarrhea. He states     

      that he came in to make sure that he was okay. Vital signs were stable he was               

      nontoxic-appearing he has not had any diarrhea since arrival or during his stay. Nor        

      has he had any vomiting. Onset: The symptoms/episode began/occurred this morning.           

      Severity of symptoms: At their worst the symptoms were mild in the emergency department     

      the symptoms are unchanged. The patient has not experienced similar symptoms in the         

      past. The patient has not recently seen a physician.                                        

                                                                                                  

Historical:                                                                                       

- Home Meds:                                                                                      

03:20 lisinopril 20 mg Oral tab 1 tab once daily [Active];                                    kd3 

- PMHx:                                                                                           

03:20 Brain bleed-craniotomy; Hypertension;                                                   kd3 

- PSHx:                                                                                           

03:20 Craniotomy;                                                                             kd3 

                                                                                                  

- Immunization history:: Adult Immunizations up to date.                                          

- Social history:: Smoking status: unknown.                                                       

                                                                                                  

                                                                                                  

ROS:                                                                                              

07:45 Constitutional: Negative for chills, and weight loss, he has had subjective fever Eyes: kdr 

      Negative for injury, pain, redness, and discharge, ENT: Negative for injury, pain, and      

      discharge, Neck: Negative for injury, pain, and swelling, Cardiovascular: Negative for      

      chest pain, palpitations, and edema, Respiratory: Negative for shortness of breath,         

      cough, wheezing, and pleuritic chest pain, Back: Negative for injury and pain, :          

      Negative for injury, bleeding, discharge, and swelling, MS/Extremity: Negative for          

      injury and deformity, Skin: Negative for injury, rash, and discoloration, Neuro:            

      Negative for headache, weakness, numbness, tingling, and seizure activity. Psych:           

      Negative for depression, anxiety, suicide ideation, homicidal ideation, and                 

      hallucinations, Allergy/Immunology: Negative for hives, rash, and allergies, Endocrine:     

      Negative for neck swelling, polydipsia, polyuria, polyphagia, and marked weight             

      changes, Hematologic/Lymphatic: Negative for swollen nodes, abnormal bleeding, and          

      unusual bruising.                                                                           

07:45 Abdomen/GI: Positive for vomiting, diarrhea, Negative for constipation, abdominal           

      cramps, abdominal distension, anorexia, black/tarry stool, rectal pain, rectal bleeding.    

                                                                                                  

Exam:                                                                                             

07:45 Constitutional:  This is a well developed, well nourished patient who is awake, alert,  kdr 

      and in no acute distress. Head/Face:  Normocephalic, atraumatic. Eyes:  Pupils equal        

      round and reactive to light, extra-ocular motions intact.  Lids and lashes normal.          

      Conjunctiva and sclera are non-icteric and not injected.  Cornea within normal limits.      

      Periorbital areas with no swelling, redness, or edema. Neck:  Trachea midline, no           

      thyromegaly or masses palpated, and no cervical lymphadenopathy.  Supple, full range of     

      motion without nuchal rigidity, or vertebral point tenderness.  No Meningismus.             

      Chest/axilla:  Normal chest wall appearance and motion.  Nontender with no deformity.       

      No lesions are appreciated. Cardiovascular:  Regular rate and rhythm with a normal S1       

      and S2.  No gallops, murmurs, or rubs.  Normal PMI, no JVD.  No pulse deficits.             

      Respiratory:  Lungs have equal breath sounds bilaterally, clear to auscultation and         

      percussion.  No rales, rhonchi or wheezes noted.  No increased work of breathing, no        

      retractions or nasal flaring. Abdomen/GI:  Soft, non-tender, with normal bowel sounds.      

      No distension or tympany.  No guarding or rebound.  No evidence of tenderness               

      throughout. Back:  No spinal tenderness.  No costovertebral tenderness.  Full range of      

      motion. Skin:  Warm, dry with normal turgor.  Normal color with no rashes, no lesions,      

      and no evidence of cellulitis. MS/ Extremity:  Pulses equal, no cyanosis.                   

      Neurovascular intact.  Full, normal range of motion. Neuro:  Awake and alert, GCS 15,       

      oriented to person, place, time, and situation.  Cranial nerves II-XII grossly intact.      

      Motor strength 5/5 in all extremities.  Sensory grossly intact.  Cerebellar exam            

      normal.  Normal gait. Psych:  Awake, alert, with orientation to person, place and time.     

       Behavior, mood, and affect are within normal limits.                                       

                                                                                                  

Vital Signs:                                                                                      

03:10  / 91; Pulse 53; Resp 18; Temp 98.4(O); Pulse Ox 95% on R/A; Weight 99.79 kg;     mm9 

      Height 5 ft. 8 in. (172.72 cm);                                                             

03:34  / 87; Pulse 54; Resp 16; Pulse Ox 95% on R/A;                                    kd3 

06:31  / 84; Pulse 62; Resp 17; Pulse Ox 98% on R/A;                                    kd3 

03:10 Body Mass Index 33.45 (99.79 kg, 172.72 cm)                                             mm9 

                                                                                                  

MDM:                                                                                              

06:12 Patient medically screened.                                                             kdr 

07:45 Data reviewed: vital signs, nurses notes, lab test result(s), radiologic studies.       kdr 

                                                                                                  

                                                                                             

03:02 Order name: COVID-19 SARS RT PCR (Document "Date of Onset" if Symptomatic)              kdr 

                                                                                             

03:02 Order name: Flu                                                                         kdr 

                                                                                             

03:59 Order name: SARS-COV-2 RT PCR; Complete Time: 04:31                                     EDMS

                                                                                             

04:14 Order name: Influenza Screen (A ; Complete Time: 04:31                                  EDMS

                                                                                                  

Administered Medications:                                                                         

No medications were administered                                                                  

                                                                                                  

                                                                                                  

Disposition Summary:                                                                              

22 06:12                                                                                    

Discharge Ordered                                                                                 

      Location: Home                                                                          kdr 

      Problem: new                                                                            kdr 

      Symptoms: are unchanged                                                                 kdr 

      Condition: Stable                                                                       kdr 

      Diagnosis                                                                                   

        - SARS-associated coronavirus as the cause of diseases classified elsewhere           kdr 

        - Viral infection, unspecified                                                        kdr 

      Followup:                                                                               kdr 

        - With: Private Physician                                                                  

        - When: 2 - 3 days                                                                         

        - Reason: If symptoms return, Further diagnostic work-up, Recheck today's complaints,      

      Continuance of care, Re-evaluation by your physician                                        

      Discharge Instructions:                                                                     

        - Discharge Summary Sheet                                                             kdr 

        - Viral Respiratory Infection, Easy-To-Read                                           kdr 

        - COVID-19                                                                            kdr 

        - Viral Illness, Adult                                                                kdr 

        - COVID-19: Quarantine vs. Isolation - [facility]                                            kdr 

        - Prevent the Spread of COVID-19 if You Are Sick - [facility]                                kdr 

      Forms:                                                                                      

        - Medication Reconciliation Form                                                      kdr 

        - Thank You Letter                                                                    kdr 

Signatures:                                                                                       

Dispatcher [employer]                           EDMS                                                 

Ashok Junior MD MD   kdr                                                  

Guillermina Ribeiro, RN                      RN   kd3                                                  

                                                                                                  

**************************************************************************************************